# Patient Record
Sex: FEMALE | Race: WHITE | NOT HISPANIC OR LATINO | Employment: FULL TIME | URBAN - METROPOLITAN AREA
[De-identification: names, ages, dates, MRNs, and addresses within clinical notes are randomized per-mention and may not be internally consistent; named-entity substitution may affect disease eponyms.]

---

## 2023-10-22 DIAGNOSIS — C50.919 MALIGNANT NEOPLASM OF FEMALE BREAST, UNSPECIFIED ESTROGEN RECEPTOR STATUS, UNSPECIFIED LATERALITY, UNSPECIFIED SITE OF BREAST (MULTI): Primary | ICD-10-CM

## 2023-10-22 NOTE — PROGRESS NOTES
Shannon Recio is a 43 y.o. year old female patient who had a patient care encounter with Dr. Kumar on 8/21/23  for ongoing management of Malignant neoplasm of female breast, unspecified estrogen receptor status, unspecified laterality, unspecified site of breast (CMS/HCC) [C50.919] .  Shannon has MBC and is currently being treated with palbociclib.+ exemestance.    Shannon with incr arthralgias & stiffness in AM, some nausea, no other GI toxicities.  Labs from 8/21/23:  WBC 2.1, ANC 1.00, H/H 11.5/32.9, PLTs 161;  CMP stable.  7/20/23 bone scan:  no new evidence of metastatic disease.  CA 27-29 normal at 28.4 (7/20/23)..  Continue current therapy at this time.    Per collaborative practice agreement with Dr. Kumar, on 10/22/23  I refilled palbociclib 125 mg PO once daily on D1 - 21 of a 28 day cycle.  Qty # 21 with 2 refills.  The prescription was sent to Dr. Kumar for approval, pending subsequent electronic transmission to [facility].    Next FUV is 11/21/12.      Jose Ocampo, MANDY, MS, BCOP  Clinical Pharmacist Specialist - Ambulatory Oncology

## 2023-10-23 ENCOUNTER — TELEPHONE (OUTPATIENT)
Dept: HEMATOLOGY/ONCOLOGY | Facility: CLINIC | Age: 43
End: 2023-10-23
Payer: COMMERCIAL

## 2023-10-24 DIAGNOSIS — Z17.0 MALIGNANT NEOPLASM OF OVERLAPPING SITES OF LEFT BREAST IN FEMALE, ESTROGEN RECEPTOR POSITIVE (MULTI): Primary | ICD-10-CM

## 2023-10-24 DIAGNOSIS — C50.812 MALIGNANT NEOPLASM OF OVERLAPPING SITES OF LEFT BREAST IN FEMALE, ESTROGEN RECEPTOR POSITIVE (MULTI): Primary | ICD-10-CM

## 2023-10-24 RX ORDER — HEPARIN SODIUM,PORCINE/PF 10 UNIT/ML
50 SYRINGE (ML) INTRAVENOUS AS NEEDED
Status: CANCELLED | OUTPATIENT
Start: 2023-10-24

## 2023-10-24 RX ORDER — ALBUTEROL SULFATE 0.83 MG/ML
3 SOLUTION RESPIRATORY (INHALATION) AS NEEDED
Status: CANCELLED | OUTPATIENT
Start: 2024-07-28

## 2023-10-24 RX ORDER — HEPARIN 100 UNIT/ML
500 SYRINGE INTRAVENOUS AS NEEDED
Status: CANCELLED | OUTPATIENT
Start: 2023-10-24

## 2023-10-24 RX ORDER — FAMOTIDINE 10 MG/ML
20 INJECTION INTRAVENOUS ONCE AS NEEDED
Status: CANCELLED | OUTPATIENT
Start: 2024-07-28

## 2023-10-24 RX ORDER — EPINEPHRINE 0.3 MG/.3ML
0.3 INJECTION SUBCUTANEOUS EVERY 5 MIN PRN
Status: CANCELLED | OUTPATIENT
Start: 2024-07-28

## 2023-10-24 RX ORDER — DIPHENHYDRAMINE HYDROCHLORIDE 50 MG/ML
50 INJECTION INTRAMUSCULAR; INTRAVENOUS AS NEEDED
Status: CANCELLED | OUTPATIENT
Start: 2024-07-28

## 2023-10-24 NOTE — TELEPHONE ENCOUNTER
I spoke with Shannon and notified her that per Dr. Kumar she will have Zometa every 6 months and she will be due for it again in February. Shannon stated that normally she has her follow ups on Mondays and then she gets the all clear to start her medication that day after her provider visit. I notified Shannon that if she prefers we can adjust her follow up with Luis for Monday 11/20/2023 at 8am. Patient was agreeable, appreciative and had no other questions/concerns at this time. Patient appointment was adjusted.

## 2023-11-01 ENCOUNTER — SPECIALTY PHARMACY (OUTPATIENT)
Dept: PHARMACY | Facility: CLINIC | Age: 43
End: 2023-11-01

## 2023-11-20 ENCOUNTER — TELEPHONE (OUTPATIENT)
Dept: HEMATOLOGY/ONCOLOGY | Facility: CLINIC | Age: 43
End: 2023-11-20

## 2023-11-20 ENCOUNTER — LAB (OUTPATIENT)
Dept: LAB | Facility: CLINIC | Age: 43
End: 2023-11-20
Payer: COMMERCIAL

## 2023-11-20 ENCOUNTER — OFFICE VISIT (OUTPATIENT)
Dept: HEMATOLOGY/ONCOLOGY | Facility: CLINIC | Age: 43
End: 2023-11-20
Payer: COMMERCIAL

## 2023-11-20 VITALS
BODY MASS INDEX: 28.51 KG/M2 | RESPIRATION RATE: 16 BRPM | HEART RATE: 72 BPM | OXYGEN SATURATION: 95 % | WEIGHT: 151.01 LBS | HEIGHT: 61 IN | DIASTOLIC BLOOD PRESSURE: 75 MMHG | TEMPERATURE: 97.9 F | SYSTOLIC BLOOD PRESSURE: 122 MMHG

## 2023-11-20 DIAGNOSIS — Z17.0 MALIGNANT NEOPLASM OF OVERLAPPING SITES OF LEFT BREAST IN FEMALE, ESTROGEN RECEPTOR POSITIVE (MULTI): Primary | ICD-10-CM

## 2023-11-20 DIAGNOSIS — Z17.0 MALIGNANT NEOPLASM OF OVERLAPPING SITES OF LEFT BREAST IN FEMALE, ESTROGEN RECEPTOR POSITIVE (MULTI): ICD-10-CM

## 2023-11-20 DIAGNOSIS — C50.812 MALIGNANT NEOPLASM OF OVERLAPPING SITES OF LEFT BREAST IN FEMALE, ESTROGEN RECEPTOR POSITIVE (MULTI): Primary | ICD-10-CM

## 2023-11-20 DIAGNOSIS — C50.812 MALIGNANT NEOPLASM OF OVERLAPPING SITES OF LEFT BREAST IN FEMALE, ESTROGEN RECEPTOR POSITIVE (MULTI): ICD-10-CM

## 2023-11-20 DIAGNOSIS — C50.919 MALIGNANT NEOPLASM OF UNSPECIFIED SITE OF UNSPECIFIED FEMALE BREAST (MULTI): ICD-10-CM

## 2023-11-20 LAB
ALBUMIN SERPL BCP-MCNC: 4.5 G/DL (ref 3.4–5)
ALP SERPL-CCNC: 75 U/L (ref 33–110)
ALT SERPL W P-5'-P-CCNC: 135 U/L (ref 7–45)
ANION GAP SERPL CALC-SCNC: 13 MMOL/L (ref 10–20)
AST SERPL W P-5'-P-CCNC: 78 U/L (ref 9–39)
BASOPHILS # BLD AUTO: 0.05 X10*3/UL (ref 0–0.1)
BASOPHILS NFR BLD AUTO: 1.6 %
BILIRUB SERPL-MCNC: 0.3 MG/DL (ref 0–1.2)
BUN SERPL-MCNC: 16 MG/DL (ref 6–23)
CALCIUM SERPL-MCNC: 9.4 MG/DL (ref 8.6–10.3)
CANCER AG27-29 SERPL-ACNC: 22.1 U/ML (ref 0–38.6)
CHLORIDE SERPL-SCNC: 102 MMOL/L (ref 98–107)
CO2 SERPL-SCNC: 28 MMOL/L (ref 21–32)
CREAT SERPL-MCNC: 0.66 MG/DL (ref 0.5–1.05)
EOSINOPHIL # BLD AUTO: 0.04 X10*3/UL (ref 0–0.7)
EOSINOPHIL NFR BLD AUTO: 1.3 %
ERYTHROCYTE [DISTWIDTH] IN BLOOD BY AUTOMATED COUNT: 13.4 % (ref 11.5–14.5)
GFR SERPL CREATININE-BSD FRML MDRD: >90 ML/MIN/1.73M*2
GLUCOSE SERPL-MCNC: 114 MG/DL (ref 74–99)
HCT VFR BLD AUTO: 35.1 % (ref 36–46)
HGB BLD-MCNC: 11.9 G/DL (ref 12–16)
IMM GRANULOCYTES # BLD AUTO: 0.05 X10*3/UL (ref 0–0.7)
IMM GRANULOCYTES NFR BLD AUTO: 1.6 % (ref 0–0.9)
LYMPHOCYTES # BLD AUTO: 0.6 X10*3/UL (ref 1.2–4.8)
LYMPHOCYTES NFR BLD AUTO: 19.5 %
MCH RBC QN AUTO: 35.4 PG (ref 26–34)
MCHC RBC AUTO-ENTMCNC: 33.9 G/DL (ref 32–36)
MCV RBC AUTO: 105 FL (ref 80–100)
MONOCYTES # BLD AUTO: 0.61 X10*3/UL (ref 0.1–1)
MONOCYTES NFR BLD AUTO: 19.9 %
NEUTROPHILS # BLD AUTO: 1.72 X10*3/UL (ref 1.2–7.7)
NEUTROPHILS NFR BLD AUTO: 56.1 %
PLATELET # BLD AUTO: 219 X10*3/UL (ref 150–450)
POTASSIUM SERPL-SCNC: 4.6 MMOL/L (ref 3.5–5.3)
PROT SERPL-MCNC: 7 G/DL (ref 6.4–8.2)
RBC # BLD AUTO: 3.36 X10*6/UL (ref 4–5.2)
SODIUM SERPL-SCNC: 138 MMOL/L (ref 136–145)
WBC # BLD AUTO: 3.1 X10*3/UL (ref 4.4–11.3)

## 2023-11-20 PROCEDURE — 36415 COLL VENOUS BLD VENIPUNCTURE: CPT

## 2023-11-20 PROCEDURE — 99214 OFFICE O/P EST MOD 30 MIN: CPT

## 2023-11-20 PROCEDURE — 86300 IMMUNOASSAY TUMOR CA 15-3: CPT

## 2023-11-20 PROCEDURE — 80053 COMPREHEN METABOLIC PANEL: CPT

## 2023-11-20 PROCEDURE — 85025 COMPLETE CBC W/AUTO DIFF WBC: CPT

## 2023-11-20 RX ORDER — BUSPIRONE HYDROCHLORIDE 15 MG/1
1 TABLET ORAL 2 TIMES DAILY
COMMUNITY

## 2023-11-20 RX ORDER — HYDROXYZINE HYDROCHLORIDE 25 MG/1
1 TABLET, FILM COATED ORAL EVERY 4 HOURS PRN
COMMUNITY

## 2023-11-20 RX ORDER — SUMATRIPTAN 50 MG/1
50 TABLET, FILM COATED ORAL ONCE AS NEEDED
COMMUNITY

## 2023-11-20 RX ORDER — PROPRANOLOL HYDROCHLORIDE 60 MG/1
60 CAPSULE, EXTENDED RELEASE ORAL DAILY
COMMUNITY

## 2023-11-20 RX ORDER — ACETAMINOPHEN 500 MG
1 TABLET ORAL DAILY
COMMUNITY

## 2023-11-20 RX ORDER — BISMUTH SUBSALICYLATE 262 MG
1 TABLET,CHEWABLE ORAL DAILY
COMMUNITY
End: 2024-02-12 | Stop reason: WASHOUT

## 2023-11-20 RX ORDER — TRIAMCINOLONE ACETONIDE 1 MG/G
1 CREAM TOPICAL 2 TIMES DAILY
COMMUNITY

## 2023-11-20 RX ORDER — CICLOPIROX 1% / CLOBETASOL PROPIONATE 0.05% 1; .05 G/100G; G/100G
1 SHAMPOO TOPICAL EVERY OTHER DAY
COMMUNITY
End: 2024-02-12 | Stop reason: WASHOUT

## 2023-11-20 RX ORDER — VENLAFAXINE 75 MG/1
75 TABLET ORAL 2 TIMES DAILY
COMMUNITY
End: 2024-02-12 | Stop reason: WASHOUT

## 2023-11-20 RX ORDER — EXEMESTANE 25 MG/1
25 TABLET ORAL DAILY
COMMUNITY
End: 2023-12-29 | Stop reason: SDUPTHER

## 2023-11-20 ASSESSMENT — PAIN SCALES - GENERAL: PAINLEVEL: 0-NO PAIN

## 2023-11-20 NOTE — TELEPHONE ENCOUNTER
I spoke with Shannon and notified her that her ANC is normal and per Luis AGUILAR she can start her next cycle of Ibrance today with 3 weeks on and 1 week off. I also notified her that per Luis AGUILAR her kidney function was normal, and that her liver enzymes were slightly elevated. Per Shannon she does not take Tylenol and she does not drink alcohol. Bernie is aware that the providers would like for her to have a CT C/A/P and a bone scan done in the next 2-3 weeks; per Bernie either 12/11/23 or 12/12/23 could work for her schedule and her preferred facility is [facility], she also aware that she will have a phone visit with Dr. Kumar after those scans. I notified Bernie that we will work on her scheduling orders and give her a call with updates. She gave verbal understanding, was appreciative and had no other questions/concerns at this time.

## 2023-11-20 NOTE — TELEPHONE ENCOUNTER
I spoke with Shannon and notified her that the [facility] staff are aware of her appointments and is currently working on scheduling them and that she should see updated appointments with her scans and provider follow up in her [facility] and that the [facility] staff may give her a courtesy call. I also notified her that if she does not see her appointments or receive a call from [facility] by next Monday 11/27/23 to please call so we can help her schedule. Shannon gave verbal understanding, was appreciative and had no other questions/concerns at this time.

## 2023-11-20 NOTE — PROGRESS NOTES
Patient ID: Shannon Recio is a 43 y.o. female.    Subjective    HPI    Ms. Shannon Recio is a 42 y/o F who presents for follow-up for early left breast cancer. She is on exemestane, Ibrance and zometa infusions.      Abby presents for 3 month follow up. She reports she may have taken two Ibrance cycles back to back by mistake and therefore took 14 days off this cycle. She is due to start her Ibrance today if labs are appropriate. She reports tart cherry and tumeric did not do much for her morning joint pain and stiffness. She denies recent infections and signs/symptoms of bleeding. She says her fatigue is manageable and she did have diarrhea last week that resolved. She denies nausea, vomiting, constipation, bone pain, SOB, chest pain, cough, skin changes or edema. She thinks she felt a lump on her medial lower right breast but could not locate it today. She is eating and drinking well. She reports she recently had her covid and flu shot completed.     She reports new insurance will go into play at the beginning of the year.  ROS 14 points performed, See HPI for exceptions        PMHx: Migraines, anxiety     PSHx: Bilateral mastectomy, tonsillectomy, wisdom teeth removal, hemorrhoidectomy      FHx: Mother with skin cancer. Father on Plavix secondary to heart stents ad history of heart attack. Paternal grandmother had breast cancer. Maternal grandma has pancreatic cancer.  No other specific history of bleeding, clotting or malignant disorder  in the family.      Social Hx:  with 2 children (10 and 11 y/o in 2022). Little EtOH use, non-smoker. Pharmacist at [employer], currently on leave from work.      Objective         Physical Exam  Constitutional:       General: She is not in acute distress.     Appearance: Normal appearance.   HENT:      Head: Normocephalic.      Mouth/Throat:      Mouth: Mucous membranes are moist.      Pharynx: No oropharyngeal exudate or posterior oropharyngeal erythema.    Eyes:      General: No scleral icterus.     Pupils: Pupils are equal, round, and reactive to light.   Cardiovascular:      Rate and Rhythm: Normal rate and regular rhythm.   Pulmonary:      Effort: Pulmonary effort is normal. No respiratory distress.      Breath sounds: Normal breath sounds. No wheezing.   Chest:      Comments: Right breast below nipple palpated due to patient having lump in the area but patient nor I could find lump today  Abdominal:      General: Abdomen is flat. Bowel sounds are normal. There is no distension.      Palpations: Abdomen is soft.      Tenderness: There is no abdominal tenderness.   Musculoskeletal:         General: Normal range of motion.      Cervical back: Normal range of motion and neck supple.   Skin:     General: Skin is warm and dry.   Neurological:      General: No focal deficit present.      Mental Status: She is alert and oriented to person, place, and time. Mental status is at baseline.      Motor: No weakness.      Gait: Gait normal.   Psychiatric:         Mood and Affect: Mood normal.         Behavior: Behavior normal.         Judgment: Judgment normal.         Performance Status:  Asymptomatic    Labs:  Lab Results  Component Value Date   WBC 3.1 (L) 11/20/2023   NEUTROABS 1.72 11/20/2023   IGABSOL 0.05 11/20/2023   LYMPHSABS 0.60 (L) 11/20/2023   MONOSABS 0.61 11/20/2023   EOSABS 0.04 11/20/2023   BASOSABS 0.05 11/20/2023   RBC 3.36 (L) 11/20/2023    (H) 11/20/2023   MCHC 33.9 11/20/2023   HGB 11.9 (L) 11/20/2023   HCT 35.1 (L) 11/20/2023    11/20/2023    Lab Results  Component Value Date   CREATININE 0.66 11/20/2023   BUN 16 11/20/2023   EGFR >90 11/20/2023    11/20/2023   K 4.6 11/20/2023    11/20/2023   CO2 28 11/20/2023     Lab Results  Component Value Date    (H) 11/20/2023   AST 78 (H) 11/20/2023   ALKPHOS 75 11/20/2023   BILITOT 0.3 11/20/2023       Assessment/Plan     1. Left breast IDC, stage IIB [id number], ER>95% MT>95% ,  HER2 neg, low risk mammaprint  2. Biopsy proven metastases to liver 8/1/22, hormone positive     - initially palpated a left breast mass with nipple changes in 3/21/2021 and she was referred to Dr. Waterman - diagnostic mammogram with tomosynthesis on 3/30/2022 and there was a mass highly suspicious of malignancy in the left breast. No findings in  the right breast. She underwent subsequently a ultrasound on the same day and she was found to have an irregular spiculated hypoechoic shadowing mass measuring 1.9 x 1.6 x 2.9 cm at 1 o'clock, 3 cm from the nipple. There were 8 lymph nodes noted in the  left axilla with 2-3 lymph nodes of intermediate suspicion of involvement  - core needle biopsy on 4/11/2022 and final pathology shows invasive ductal carcinoma, grade 2, in DCIS in the breast mass. The most suspicious lymph node was also biopsied and negative for carcinoma  - 5/13/22 bilateral mastectomy. Left breast showed invasive ductal carcinoma, grade 3 with DCIS. Final tumor size was 3.4 cm. Grade 3 with no extensive intraductal component. There was indeterminant left LVI. DCIS was present. All margins were negative.  There was also macrometastatic carcinoma in 2/4 lymph nodes. End stage N1a. Given stage 2 disease, she did not have staging scans completed  - initially discussed with her low risk mammaprint despite higher clinical risk with node involvement, ultimately decided to not pursue adjuvant chemotherapy   - during CT sim for RT planning, found to have concerning breast lesion  - Liver biopsy 8/1/22 consistent with primary breast cancer  - today discussed new diagnosis of metastatic disease and overall approach to therapy and prognosis  - Reviewed first line therapy with CDK 4/6 inhibitor plus AI inhibitor  - Regarding choice of CDK4/6 inhibitor, given liver involvement I discussed with patient that although there is some more longer survival data with ribociclib, I would be concerned about risk of  hepatotoxicity as if there is no other distant disease we  could consider treatment of oligometastatic disease  - will plan to start Ibrance 125 mg PO daily D1-21 every 28 days with letrozole 2.5 mg PO daily, August 2022  - discussed importance of ovarian suppression when initiating Ibrance plus palbociclib and patient is agreeable  - discussed different options of ovarian suppression and ultimately she decided on bilateral oophorectomy  - referral placed with Dr Giles for surgery  - will plan for to initiate Lupron every 4 weeks for ovarian suppression until surgery is completed   - discussed side effects of treatment  and consent signed  - received further education with pharmFREDRICK Mayo  - will plan for followup with labs [id number]   - bilateral salpingo-oophorectomy on 10/10/22  - Signatera from 11/7 0.07 MTM/mL     1/9/23:  - ANC at goal today.  - We will continue with same dose of Ibrance.  - She is planned for surgery on 1/31/23. Discussed we will hold her Ibrance starting 1/24/23, which is 1 week early and when she starts her next prescription, we will plan to administer 2 weeks rather than 3.  - We discussed that we will plan for Signatera at next visit along with a PET CT prior to next visit before breast reconstruction, which will be in approximately 3 weeks  - At that point, we will discuss if there are no other site of disease if there can be any definitive management for her liver  - RTC in approximately 3 weeks      2/6/23:  - Overall, patient is recovering okay from her reconstructive surgery. States that she is still sore.  - We did review her PET CT from 1/23/23, which shows there is no clear evidence of disease. The lesion on the liver is not very strongly FDG-avid anymore, however we discussed that it is unclear if viable disease could still be present.  - Discussed that I will discuss at Tumor Board this Thursday if there is any role for definitive treatment of her liver.  - She will restart  her Ibrance tonight or tomorrow.   - We did review her blood work today which shows just a stable anemia with hemoglobin 10, that is likely related to recent surgery.  - We did review that for her hot flashes she could increase her Effexor. We will place a new prescription today. We also discussed different treatment options including tart cherry and tumeric for arthralgias and different vitamins for hair loss. We will  reassess at our next visit.  - RTC in 4 weeks for next blood work and follow-up.      3/6/23:   Overall, tolerating Ibrance without any major toxicities. Counts are at goal.  - Discussed we can stretch our follow-ups to every 3 months.   - Did review her MRI of the liver which did show a decreasing liver mass, however we will discuss with Dr. Horne regarding SBRT.   - Otherwise, no major complaints.   - We discussed that we will continue Signatera on an every 3-month basis with the goal that we will limit scans if her Signatera continues to be negative.   - RTC in 3 months.     6/5/23:  - Patient did complete 50 Gy in 25 fractions to her regional nodes and chest wall along with SBRT completed on 5/22/2023. She also underwent SBRT to the liver metastases from 4/4 to 4/11/2023, for a total of 54 Gy in 3 fractions.   - Patient overall tolerated radiation fine. States her skin is recovering.  - She is currently only 1 week out from her recent cycle of Ibrance which likely explains why she is having worsening cytopenias.  - We discussed that we will plan for Signatera today and discuss results.    - We will plan for repeat visit in 7 weeks which will be completion of her cycle. We will plan for a PET CT as well prior to that visit.  - RTC in 7 weeks with me with scans and labs prior.      8/21/23:  - Reviewed scans from 7/20/23. No new evidence of recurrence.   -We did review her Signatera is also negative and her blood work is unremarkable.   - She is having some more arthralgias and describes some  nausea and tingling more in the left hand but also sometimes in her right hand with use.   - We will check a CBC and CMP today to make sure her counts are at goal. Once I have those results, we will discuss if it is okay to continue same dose of Ibrance versus decreased dose along with if we want to make a switch to exemestane.  - RTC in 3 months with Luis and I will see her again in 6 months if she is doing well.    11/20/2023:   - 3 month follow up   - Believes she forgot to take her week off between Ibrance cycles so just took 14 days off and plans to restart today  - She denies new or worsening symptoms or concerns  - She is going to see Dr. Waterman for follow up on 12/4/23  - She is scheduled to see Dr. Kumar on 2/21/24  - Labs today show ALT and AST mildly elevated which may be related to Ibrance vs disease as she denies taking Tylenol regularly or drinking alcohol  - ANC 1.72 is above goal so she will proceed with next dose of Ibrance today and aware to take for 21 days then off for 7 days  - Due to LFTs we will go ahead and reorder staging scans including CT c/a/p and bone scan with phone follow up with Dr. Kumar in December   - We will call her with her signatera results   - Referral to integrative oncology for ongoing joint stiffness/pain  - RTC in Feb. With Dr. Kumar for labs and zometa       Diagnoses and all orders for this visit:  Malignant neoplasm of overlapping sites of left breast in female, estrogen receptor positive (CMS/HCC)  -     Cancer Antigen 27-29; Future  -     Referral to [facility]; Future             Luis Kohli, TAB-CNP

## 2023-11-21 ENCOUNTER — APPOINTMENT (OUTPATIENT)
Dept: HEMATOLOGY/ONCOLOGY | Facility: CLINIC | Age: 43
End: 2023-11-21

## 2023-11-28 ENCOUNTER — TELEPHONE (OUTPATIENT)
Dept: HEMATOLOGY/ONCOLOGY | Facility: CLINIC | Age: 43
End: 2023-11-28
Payer: COMMERCIAL

## 2023-11-28 NOTE — TELEPHONE ENCOUNTER
Left message on name specific voice mail with the test result. Encouraged to call office back with any further questions or concerns.

## 2023-12-01 NOTE — PROGRESS NOTES
BREAST SURGICAL ONCOLOGY FOLLOW UP     Assessment/Plan     1. left breast IDC g2 ER 95% MI 95% HER2- at 1:00 3cmFN measuring 3.4 on final pathology s/p bilateral ssm, left slnb 2/4)  -[id number] stage IV with metastasis to liver  -s/p BSO  -on Ibrance and letrozole with good response on subsequent imaging  -plans in the future for liver resection  -stage 2 reconstruction with Dr. Matta  -s/p XRT to left chest wall and breast and SBRT to liver   -Signatera 11/27/2023 negative     2. family history of breast cancer  -genetic testing negative     Clinical breast exam today is normal.  There is no evidence of local recurrence.    Continue follow up with medical oncology as scheduled.    Will follow up in the breast center with me in 1 year or sooner for any concerns.    Subjective   Shannon Recio is a 43 y.o. female presents today for follow up carcinoma of the left breast.     Treatment history    Left breast mass palpated with nipple changes by patient's PCP. Prior to this Abby did not note any changes. After visit with the PCP she notes a mass and nipple inversion. She also notes changes / contour irregularity to the breast while leaning forward     Diagnostic mammogram and U/S on 3/31/2022 reveal a mass in the left breast 1:00 3cmFN measuring 4.4cm in greatest dimension. In the axilla there are 2 abnormal level 1 lymph nodes. U/S biopsy of mass and lymph node #7 is recommended.      4/11/2022 left U/S biopsy:  breast mass 1:00 IDC g2 ER 95% MI 95% HER2-  lymph node: benign and concordant     breast MRI ordered given breast density and young age of diagnosis.     5/15/2022 breast MRI: mass UOQ left breast 4.6cm, additional area of enhancement at the base of the nipple concerning for involvement with nipple retraction. No skin or pectoralis enhancement. Biopsy clip in left axillary lymph node- no axillary adenopathy bilaterally. Right breast no suspicious masses or enhancement.      5/13/2022 left skin  sparing mastectomy: tumor 3.4cm, margins >2mm, sentinel lymph node biopsy (2/4). right skin sparing mastectomy: benign.     Staging scans were initially ordered by me but denied by insurance. She then had her CT simulation for post mastectomy radiation. CT SIM with findings concerning for a liver lesion. This then allowed staging scans to be performed. Subsequent liver bx confirming liver metastasis.      She is now on Ibrance and letrozole (s/p BSO). most recent imaging with no disease progression. Liver lesion is significantly smaller.      s/p SBRT to liver and XRT to left chest wall and axilla.    Review of Systems   Constitutional symptoms: Denies generalized fatigue.  Denies weight change, fevers/chills, difficulty sleeping   Eyes: Denies double vision, glaucoma, cataracts.  Ear/nose/throat/mouth: Denies hearing changes, sore throat, sinus problems.  Cardiovascular: No chest pain.  Denies irregular heartbeat.  Denies ankle swelling.  Respiratory: No wheezing, cough, or shortness of breath.  Gastrointestinal: No abdominal pain,  No nausea/vomiting.  No indigestion/heartburn.  No change in bowel habits.  No constipation or diarrhea.   Genitourinary: No urinary incontinence.  No urinary frequency.  No painful urination.  Musculoskeletal: No bone pain, no muscle pain, no joint pain.   Integumentary: No rash. No masses.  No changes in moles.  No easy bruising.  Neurological: No headaches.  No tremors. No numbness/tingling.  Psychiatric: No anxiety. No depression.  Endocrine: No excessive thirst.  Not too hot or too cold.  Not tired or fatigued.    Hematological/lymphatic: No swollen glands or blood clotting problems.  No bruising.    Objective   Physical Exam  General: Alert and oriented x 3.  Mood and affect are appropriate.  HEENT: EOMI, PERRLA.   Neck: supple, no masses, no cervical adenopathy.  Cardiovascular: no lower extremity edema.  Pulmonary: breathing non labored on room air.  GI: Abdomen soft, no masses.  No hepatomegaly or splenomegaly.  Lymph nodes: No supraclavicular or axillary adenopathy bilaterally.  Musculoskeletal: Full range of motion in the upper extremities bilaterally.  Neuro: denies dizziness, tremors    Breasts: The breasts were examined in both the seated and supine positions.   RIGHT: surgically absent with implant reconstruction. Flaps viable. No suspicious masses.   LEFT: surgically absent with implant reconstruction. Flaps viable. No suspicious masses. Left implant sits higher with more superior pole projection. Acute XRT changes lateral chest all and shoulder.       Radiology review: All images and reports were personally reviewed.         Itzel Waterman, DO

## 2023-12-04 ENCOUNTER — OFFICE VISIT (OUTPATIENT)
Dept: SURGICAL ONCOLOGY | Facility: HOSPITAL | Age: 43
End: 2023-12-04
Payer: COMMERCIAL

## 2023-12-04 VITALS
SYSTOLIC BLOOD PRESSURE: 133 MMHG | HEIGHT: 61 IN | HEART RATE: 69 BPM | DIASTOLIC BLOOD PRESSURE: 86 MMHG | BODY MASS INDEX: 28.32 KG/M2 | WEIGHT: 150 LBS

## 2023-12-04 DIAGNOSIS — C50.812 MALIGNANT NEOPLASM OF OVERLAPPING SITES OF LEFT BREAST IN FEMALE, ESTROGEN RECEPTOR POSITIVE (MULTI): Primary | ICD-10-CM

## 2023-12-04 DIAGNOSIS — Z17.0 MALIGNANT NEOPLASM OF OVERLAPPING SITES OF LEFT BREAST IN FEMALE, ESTROGEN RECEPTOR POSITIVE (MULTI): Primary | ICD-10-CM

## 2023-12-04 PROCEDURE — 99213 OFFICE O/P EST LOW 20 MIN: CPT | Performed by: SURGERY

## 2023-12-04 PROCEDURE — 1036F TOBACCO NON-USER: CPT | Performed by: SURGERY

## 2023-12-08 PROBLEM — D22.70 MELANOCYTIC NEVI OF LOWER LIMB, INCLUDING HIP: Status: ACTIVE | Noted: 2022-09-14

## 2023-12-08 PROBLEM — L24.9 IRRITANT CONTACT DERMATITIS, UNSPECIFIED CAUSE: Status: ACTIVE | Noted: 2022-09-14

## 2023-12-08 PROBLEM — R90.89 ABNORMAL BRAIN MRI: Status: ACTIVE | Noted: 2023-12-08

## 2023-12-08 PROBLEM — U07.1 COVID-19 VIRUS INFECTION: Status: ACTIVE | Noted: 2023-12-08

## 2023-12-08 PROBLEM — I49.3 PVC (PREMATURE VENTRICULAR CONTRACTION): Status: ACTIVE | Noted: 2023-12-08

## 2023-12-08 PROBLEM — J40 BRONCHITIS: Status: ACTIVE | Noted: 2023-12-08

## 2023-12-08 PROBLEM — F32.A ANXIETY AND DEPRESSION: Status: ACTIVE | Noted: 2023-12-08

## 2023-12-08 PROBLEM — T88.8XXA FLUID COLLECTION AT SURGICAL SITE: Status: RESOLVED | Noted: 2023-12-08 | Resolved: 2023-12-08

## 2023-12-08 PROBLEM — R21 RASH, SKIN: Status: ACTIVE | Noted: 2023-12-08

## 2023-12-08 PROBLEM — B07.9 WART: Status: ACTIVE | Noted: 2023-12-08

## 2023-12-08 PROBLEM — I83.90 VARICOSE VEIN OF LEG: Status: ACTIVE | Noted: 2023-12-08

## 2023-12-08 PROBLEM — L81.4 OTHER MELANIN HYPERPIGMENTATION: Status: ACTIVE | Noted: 2022-09-14

## 2023-12-08 PROBLEM — E55.9 VITAMIN D DEFICIENCY: Status: ACTIVE | Noted: 2023-12-08

## 2023-12-08 PROBLEM — R00.2 PALPITATIONS: Status: ACTIVE | Noted: 2023-12-08

## 2023-12-08 PROBLEM — F41.9 ANXIETY AND DEPRESSION: Status: ACTIVE | Noted: 2023-12-08

## 2023-12-08 PROBLEM — R92.30 DENSE BREAST: Status: ACTIVE | Noted: 2023-12-08

## 2023-12-08 PROBLEM — L82.1 OTHER SEBORRHEIC KERATOSIS: Status: ACTIVE | Noted: 2022-09-14

## 2023-12-08 PROBLEM — N63.25 BREAST LUMP ON LEFT SIDE AT 12 O'CLOCK POSITION: Status: ACTIVE | Noted: 2023-12-08

## 2023-12-08 PROBLEM — G43.909 MIGRAINE: Status: ACTIVE | Noted: 2023-12-08

## 2023-12-08 PROBLEM — N64.59 INVERSION OF LEFT NIPPLE: Status: ACTIVE | Noted: 2023-12-08

## 2023-12-08 PROBLEM — D23.9 OTHER BENIGN NEOPLASM OF SKIN, UNSPECIFIED: Status: ACTIVE | Noted: 2022-09-14

## 2023-12-08 PROBLEM — B35.3 TINEA PEDIS: Status: ACTIVE | Noted: 2022-09-14

## 2023-12-08 PROBLEM — B02.9 SHINGLES: Status: ACTIVE | Noted: 2023-12-08

## 2023-12-08 PROBLEM — L21.9 SEBORRHEIC DERMATITIS OF SCALP: Status: ACTIVE | Noted: 2022-09-14

## 2023-12-08 PROBLEM — J34.1 MUCOUS RETENTION CYST OF MAXILLARY SINUS: Status: ACTIVE | Noted: 2023-12-08

## 2023-12-08 PROBLEM — R19.7 DIARRHEA: Status: ACTIVE | Noted: 2023-12-08

## 2023-12-08 PROBLEM — L70.0 ACNE VULGARIS: Status: ACTIVE | Noted: 2022-09-14

## 2023-12-08 PROBLEM — N83.209 CYSTIC DISEASE OF OVARIES: Status: ACTIVE | Noted: 2023-12-08

## 2023-12-08 PROBLEM — D18.01 HEMANGIOMA OF SKIN AND SUBCUTANEOUS TISSUE: Status: ACTIVE | Noted: 2022-09-14

## 2023-12-08 PROBLEM — R92.2 DENSE BREAST: Status: ACTIVE | Noted: 2023-12-08

## 2023-12-08 RX ORDER — ERGOCALCIFEROL 1.25 MG/1
1 CAPSULE ORAL
COMMUNITY
End: 2024-02-12 | Stop reason: WASHOUT

## 2023-12-08 RX ORDER — ONDANSETRON HYDROCHLORIDE 8 MG/1
8 TABLET, FILM COATED ORAL 2 TIMES DAILY
COMMUNITY
Start: 2023-03-23 | End: 2024-02-12 | Stop reason: WASHOUT

## 2023-12-08 RX ORDER — PREDNISONE 5 MG/1
TABLET ORAL
COMMUNITY
Start: 2016-12-20 | End: 2024-02-12 | Stop reason: WASHOUT

## 2023-12-08 RX ORDER — NAPROXEN SODIUM 550 MG/1
TABLET ORAL EVERY 12 HOURS
COMMUNITY
Start: 2015-05-21

## 2023-12-08 RX ORDER — BETAMETHASONE DIPROPIONATE 0.5 MG/G
CREAM TOPICAL
COMMUNITY
End: 2024-02-12 | Stop reason: WASHOUT

## 2023-12-08 RX ORDER — CEPHALEXIN 500 MG/1
500 CAPSULE ORAL 3 TIMES DAILY
COMMUNITY
Start: 2023-01-31 | End: 2024-02-12 | Stop reason: WASHOUT

## 2023-12-08 RX ORDER — CLOBETASOL PROPIONATE 0.46 MG/ML
SOLUTION TOPICAL
COMMUNITY
Start: 2015-08-24 | End: 2024-02-12 | Stop reason: WASHOUT

## 2023-12-08 RX ORDER — FAMCICLOVIR 500 MG/1
1 TABLET ORAL 3 TIMES DAILY
COMMUNITY
Start: 2022-08-31 | End: 2024-02-12 | Stop reason: WASHOUT

## 2023-12-08 RX ORDER — METRONIDAZOLE 500 MG/1
500 TABLET ORAL 2 TIMES DAILY
COMMUNITY
Start: 2021-02-05 | End: 2024-02-12 | Stop reason: WASHOUT

## 2023-12-08 RX ORDER — MULTIVITAMIN WITH IRON
1 TABLET ORAL DAILY
COMMUNITY
End: 2024-02-12 | Stop reason: WASHOUT

## 2023-12-08 RX ORDER — AMOXICILLIN 875 MG/1
1 TABLET, FILM COATED ORAL EVERY 12 HOURS
COMMUNITY
Start: 2022-08-29 | End: 2024-02-12 | Stop reason: WASHOUT

## 2023-12-08 RX ORDER — DIPHENHYDRAMINE HCL 25 MG
TABLET ORAL
COMMUNITY
End: 2024-02-12 | Stop reason: WASHOUT

## 2023-12-08 RX ORDER — NORETHINDRONE ACETATE AND ETHINYL ESTRADIOL, AND FERROUS FUMARATE 1MG-20(24)
1 KIT ORAL DAILY
COMMUNITY
Start: 2021-09-24 | End: 2024-02-12 | Stop reason: WASHOUT

## 2023-12-08 RX ORDER — CICLOPIROX 1 G/100ML
SHAMPOO TOPICAL
COMMUNITY
Start: 2021-06-30

## 2023-12-08 RX ORDER — TRETINOIN 0.25 MG/G
CREAM TOPICAL
COMMUNITY
Start: 2022-09-14 | End: 2024-02-12 | Stop reason: WASHOUT

## 2023-12-08 RX ORDER — NORETHINDRONE ACETATE/ETHINYL ESTRADIOL AND FERROUS FUMARATE 1MG-20(24)
KIT ORAL
COMMUNITY
End: 2024-02-12 | Stop reason: WASHOUT

## 2023-12-08 RX ORDER — LETROZOLE 2.5 MG/1
2.5 TABLET, FILM COATED ORAL DAILY
COMMUNITY
End: 2024-02-12 | Stop reason: WASHOUT

## 2023-12-08 RX ORDER — KETOCONAZOLE 20 MG/ML
SHAMPOO, SUSPENSION TOPICAL
COMMUNITY
Start: 2015-08-24 | End: 2024-02-12 | Stop reason: WASHOUT

## 2023-12-11 ENCOUNTER — HOSPITAL ENCOUNTER (OUTPATIENT)
Dept: RADIOLOGY | Facility: HOSPITAL | Age: 43
Discharge: HOME | End: 2023-12-11
Payer: COMMERCIAL

## 2023-12-11 DIAGNOSIS — C50.812 MALIGNANT NEOPLASM OF OVERLAPPING SITES OF LEFT BREAST IN FEMALE, ESTROGEN RECEPTOR POSITIVE (MULTI): ICD-10-CM

## 2023-12-11 DIAGNOSIS — Z17.0 MALIGNANT NEOPLASM OF OVERLAPPING SITES OF LEFT BREAST IN FEMALE, ESTROGEN RECEPTOR POSITIVE (MULTI): ICD-10-CM

## 2023-12-11 PROCEDURE — 78306 BONE IMAGING WHOLE BODY: CPT | Performed by: NUCLEAR MEDICINE

## 2023-12-11 PROCEDURE — 74177 CT ABD & PELVIS W/CONTRAST: CPT | Performed by: STUDENT IN AN ORGANIZED HEALTH CARE EDUCATION/TRAINING PROGRAM

## 2023-12-11 PROCEDURE — A9503 TC99M MEDRONATE: HCPCS

## 2023-12-11 PROCEDURE — 78306 BONE IMAGING WHOLE BODY: CPT

## 2023-12-11 PROCEDURE — 3430000001 HC RX 343 DIAGNOSTIC RADIOPHARMACEUTICALS

## 2023-12-11 PROCEDURE — 2550000001 HC RX 255 CONTRASTS

## 2023-12-11 PROCEDURE — 74177 CT ABD & PELVIS W/CONTRAST: CPT

## 2023-12-11 PROCEDURE — 71260 CT THORAX DX C+: CPT | Performed by: STUDENT IN AN ORGANIZED HEALTH CARE EDUCATION/TRAINING PROGRAM

## 2023-12-11 RX ADMIN — TECHNETIUM TC 99M MEDRONATE 23.1 MILLICURIE: 25 INJECTION, POWDER, FOR SOLUTION INTRAVENOUS at 09:00

## 2023-12-11 RX ADMIN — IOHEXOL 75 ML: 350 INJECTION, SOLUTION INTRAVENOUS at 10:10

## 2023-12-17 NOTE — PROGRESS NOTES
Consent:  Verbal consent was requested and obtained from patient on this date for a telehealth visit.    Patient ID: Shannon Recio is a 43 y.o. female.    Subjective    HPI  A telephone visit (audio only) between the patient at home and the provider at [facility] was utilized to provide this   telehealth service.     Ms. Shannon Recio is a 44 y/o F who presents for follow-up for early left breast cancer. She is on exemestane, Ibrance and Zometa infusions.     Since last visit, patient had a CT C/A/P along with bone scan on 12/11/2023. There are unchanged prominent bilateral axillary lymph nodes likely reactive and a slightly less conspicuous hypoattenuating focus in segment 8/4A measuring 1.3 cm, previously 1.6 cm. No new suspicious hepatic lesions or other suspicious findings in the chest, abdomen or pelvis. Bone scan shows no new evidence of osseous metastatic disease.     Patient reports that her hot flashes have improved. She reports morning arthralgias that improve as she gets moving. She states that her fatigue is stable. She drinks minimally and no heavy Tylenol use. No other complaints today.     Patient's past medical history, surgical history, family history and social history reviewed.     Objective    There were no vitals taken for this visit.    Review of Systems:   Review of Systems:    Positive per HPI, otherwise negative.     Physical Exam:   Exam deferred due to telephone call.     Performance Status:  Symptomatic; fully ambulatory    Labs/Imaging/Pathology: personally reviewed reports and images in Epic electronic medical record system. Pertinent results as it related to the plan represented in below in assessment and plan.      Assessment/Plan    1. Left breast IDC, stage IIB [id number], ER>95% NH>95% , HER2 neg, low risk mammaprint  2. Biopsy proven metastases to liver 8/1/22, hormone positive     - initially palpated a left breast mass with nipple changes in  3/21/2021 and she was referred to Dr. Waterman - diagnostic mammogram with tomosynthesis on 3/30/2022 and there was a mass highly suspicious of malignancy in the left breast. No findings in  the right breast. She underwent subsequently a ultrasound on the same day and she was found to have an irregular spiculated hypoechoic shadowing mass measuring 1.9 x 1.6 x 2.9 cm at 1 o'clock, 3 cm from the nipple. There were 8 lymph nodes noted in the  left axilla with 2-3 lymph nodes of intermediate suspicion of involvement  - core needle biopsy on 4/11/2022 and final pathology shows invasive ductal carcinoma, grade 2, in DCIS in the breast mass. The most suspicious lymph node was also biopsied and negative for carcinoma  - 5/13/22 bilateral mastectomy. Left breast showed invasive ductal carcinoma, grade 3 with DCIS. Final tumor size was 3.4 cm. Grade 3 with no extensive intraductal component. There was indeterminant left LVI. DCIS was present. All margins were negative.  There was also macrometastatic carcinoma in 2/4 lymph nodes. End stage N1a. Given stage 2 disease, she did not have staging scans completed  - initially discussed with her low risk mammaprint despite higher clinical risk with node involvement, ultimately decided to not pursue adjuvant chemotherapy   - during CT sim for RT planning, found to have concerning breast lesion  - Liver biopsy 8/1/22 consistent with primary breast cancer  - today discussed new diagnosis of metastatic disease and overall approach to therapy and prognosis  - Reviewed first line therapy with CDK 4/6 inhibitor plus AI inhibitor  - Regarding choice of CDK4/6 inhibitor, given liver involvement I discussed with patient that although there is some more longer survival data with ribociclib, I would be concerned about risk of hepatotoxicity as if there is no other distant disease we  could consider treatment of oligometastatic disease  - will plan to start Ibrance 125 mg PO daily D1-21 every 28  days with letrozole 2.5 mg PO daily, August 2022  - discussed importance of ovarian suppression when initiating Ibrance plus palbociclib and patient is agreeable  - discussed different options of ovarian suppression and ultimately she decided on bilateral oophorectomy  - referral placed with Dr Giles for surgery  - will plan for to initiate Lupron every 4 weeks for ovarian suppression until surgery is completed   - discussed side effects of treatment  and consent signed  - received further education with pharmFREDRICK Mayo  - will plan for followup with labs C1D15   - bilateral salpingo-oophorectomy on 10/10/22  - Signatera from 11/7 0.07 MTM/mL     1/9/23:  - ANC at goal today.  - We will continue with same dose of Ibrance.  - She is planned for surgery on 1/31/23. Discussed we will hold her Ibrance starting 1/24/23, which is 1 week early and when she starts her next prescription, we will plan to administer 2 weeks rather than 3.  - We discussed that we will plan for Signatera at next visit along with a PET CT prior to next visit before breast reconstruction, which will be in approximately 3 weeks  - At that point, we will discuss if there are no other site of disease if there can be any definitive management for her liver  - RTC in approximately 3 weeks      2/6/23:  - Overall, patient is recovering okay from her reconstructive surgery. States that she is still sore.  - We did review her PET CT from 1/23/23, which shows there is no clear evidence of disease. The lesion on the liver is not very strongly FDG-avid anymore, however we discussed that it is unclear if viable disease could still be present.  - Discussed that I will discuss at Tumor Board this Thursday if there is any role for definitive treatment of her liver.  - She will restart her Ibrance tonight or tomorrow.   - We did review her blood work today which shows just a stable anemia with hemoglobin 10, that is likely related to recent surgery.  -  We did review that for her hot flashes she could increase her Effexor. We will place a new prescription today. We also discussed different treatment options including tart cherry and tumeric for arthralgias and different vitamins for hair loss. We will  reassess at our next visit.  - RTC in 4 weeks for next blood work and follow-up.      3/6/23:   Overall, tolerating Ibrance without any major toxicities. Counts are at goal.  - Discussed we can stretch our follow-ups to every 3 months.   - Did review her MRI of the liver which did show a decreasing liver mass, however we will discuss with Dr. Horne regarding SBRT.   - Otherwise, no major complaints.   - We discussed that we will continue Signatera on an every 3-month basis with the goal that we will limit scans if her Signatera continues to be negative.   - RTC in 3 months.     6/5/23:  - Patient did complete 50 Gy in 25 fractions to her regional nodes and chest wall along with SBRT completed on 5/22/2023. She also underwent SBRT to the liver metastases from 4/4 to 4/11/2023, for a total of 54 Gy in 3 fractions.   - Patient overall tolerated radiation fine. States her skin is recovering.  - She is currently only 1 week out from her recent cycle of Ibrance which likely explains why she is having worsening cytopenias.  - We discussed that we will plan for Signatera today and discuss results.    - We will plan for repeat visit in 7 weeks which will be completion of her cycle. We will plan for a PET CT as well prior to that visit.  - RTC in 7 weeks with me with scans and labs prior.      8/21/23:  - Reviewed scans from 7/20/23. No new evidence of recurrence.   -We did review her Signatera is also negative and her blood work is unremarkable.   - She is having some more arthralgias and describes some nausea and tingling more in the left hand but also sometimes in her right hand with use.   - We will check a CBC and CMP today to make sure her counts are at goal. Once I have  those results, we will discuss if it is okay to continue same dose of Ibrance versus decreased dose along with if we want to make a switch to exemestane.  - RTC in 3 months with Luis and I will see her again in 6 months if she is doing well.     11/20/2023:   - 3 month follow up   - Believes she forgot to take her week off between Ibrance cycles so just took 14 days off and plans to restart today  - She denies new or worsening symptoms or concerns  - She is going to see Dr. Waterman for follow up on 12/4/23  - She is scheduled to see Dr. Kumar on 2/21/24  - Labs today show ALT and AST mildly elevated which may be related to Ibrance vs disease as she denies taking Tylenol regularly or drinking alcohol   ANC 1.72 is above goal so she will proceed with next dose of Ibrance today and aware to take for 21 days then off for 7 days  - Due to LFTs we will go ahead and reorder staging scans including CT c/a/p and bone scan with phone follow up with Dr. Kumar in December   - We will call her with her signatera results   - Referral to integrative oncology for ongoing joint stiffness/pain  - RTC in Feb. With Dr. Kumar for labs and zometa      12/18/23: Telephone call   - Most recent imaging from 12/11/23 shows no new evidence of recurrence in the bone, chest, abdomen or pelvis.   - Patient had some mild elevation in AST and ALT on 11/11/23, however she does believe she had some increased toxicity possibly from missing a week off of the drug.  - Overall, her CA27-29 and her Signatera from 11/27/23 remain negative.   - At this point, we will continue with surveillance alone with repeat labs in 2 months in February 2024, which will be 3 months from her last visit. She knows to call us with any new symptoms in the meantime.   - We will likely plan for imaging every 6 months if her other blood work remains stable and no evidence of recurrence on exam.   - She does report some increased arthralgias but overall reports they are  manageable. She is going to see Dr. Collazo from Interventional Radiology.   - States her hot flashes have improved.  - We will not make any dose adjustment at this point.   - RTC in 2 months before the start of her cycle.     RTC in 2 months before the start of her next cycle. This note has been transcribed using a medical scribe and there is a possibility of unintentional typing misprints.      Diagnoses and all orders for this visit:  Malignant neoplasm of overlapping sites of left breast in female, estrogen receptor positive (CMS/HCC)  -     Clinic Appointment Request Virtual Est (phone); SHAUN KUMAR  -     Cancer Antigen 27-29; Future  -     CBC and Auto Differential; Future  -     Clinic Appointment Request; Future  -     Comprehensive metabolic panel; Future  -     SIGNATERA; Other-indicate in comment; UNKNOWN - Miscellaneous Test; Future    Shaun Kumar MD  Hematology/Oncology  [facility]    Scribe Attestation  By signing my name below, Rebecca COLLINS  , Scribe   attest that this documentation has been prepared under the direction and in the presence of Shaun Kumar MD.

## 2023-12-18 ENCOUNTER — TELEMEDICINE (OUTPATIENT)
Dept: HEMATOLOGY/ONCOLOGY | Facility: CLINIC | Age: 43
End: 2023-12-18
Payer: COMMERCIAL

## 2023-12-18 DIAGNOSIS — Z17.0 MALIGNANT NEOPLASM OF OVERLAPPING SITES OF LEFT BREAST IN FEMALE, ESTROGEN RECEPTOR POSITIVE (MULTI): ICD-10-CM

## 2023-12-18 DIAGNOSIS — C50.812 MALIGNANT NEOPLASM OF OVERLAPPING SITES OF LEFT BREAST IN FEMALE, ESTROGEN RECEPTOR POSITIVE (MULTI): ICD-10-CM

## 2023-12-18 PROCEDURE — 99214 OFFICE O/P EST MOD 30 MIN: CPT | Performed by: INTERNAL MEDICINE

## 2023-12-29 DIAGNOSIS — C50.812 MALIGNANT NEOPLASM OF OVERLAPPING SITES OF LEFT BREAST IN FEMALE, ESTROGEN RECEPTOR POSITIVE (MULTI): ICD-10-CM

## 2023-12-29 DIAGNOSIS — Z17.0 MALIGNANT NEOPLASM OF OVERLAPPING SITES OF LEFT BREAST IN FEMALE, ESTROGEN RECEPTOR POSITIVE (MULTI): ICD-10-CM

## 2023-12-29 RX ORDER — EXEMESTANE 25 MG/1
25 TABLET ORAL DAILY
Qty: 30 TABLET | Refills: 3 | Status: SHIPPED | OUTPATIENT
Start: 2023-12-29 | End: 2024-05-06 | Stop reason: SDUPTHER

## 2024-01-08 DIAGNOSIS — Z17.0 MALIGNANT NEOPLASM OF OVERLAPPING SITES OF LEFT BREAST IN FEMALE, ESTROGEN RECEPTOR POSITIVE (MULTI): Primary | ICD-10-CM

## 2024-01-08 DIAGNOSIS — C50.812 MALIGNANT NEOPLASM OF OVERLAPPING SITES OF LEFT BREAST IN FEMALE, ESTROGEN RECEPTOR POSITIVE (MULTI): Primary | ICD-10-CM

## 2024-01-08 NOTE — PROGRESS NOTES
Shannon Recio is a 43 y.o. year old female patient who had a patient care encounter with Dr. Kumar on 12/18/23 for ongoing management of her MBC.  Shannon is currently being treated with palbociclib + exemestane.  Also receiving zoledronic acid.    Shannon has some arthralgias which are manageable.  Most recent imaging from 12/11/23:  no new evidence of recurrence.  Most recent labs on 11/20/23:  WBC 3.1, ANC 1.72, H/H 11.9/35.1, PLTs 219;   (3 x ULN, gr 1), AST 78 (2 x ULN, gr 1) -- per Dr. Kumar, LFTs may relate to patient possibly missing a week of therapy, team monitoring.  Continue current therapy.    Per collaborative practice agreement with Dr. Kumar, on 1/8/24  I entered a new [place] treatment plan for palbociclib 125 mg PO once daily on D1 - 21 of a 28-day cycle.  Qty # 21 with 5 refills.  The prescription was sent to Dr. Kumar for approval, pending subsequent electronic transmission to [facility].    Next FUV is 2/12/24.      Jose Ocampo, MANDY, MS, BCOP  Clinical Pharmacist Specialist - Ambulatory Oncology

## 2024-01-22 ENCOUNTER — ALLIED HEALTH (OUTPATIENT)
Dept: INTEGRATIVE MEDICINE | Facility: CLINIC | Age: 44
End: 2024-01-22
Payer: COMMERCIAL

## 2024-01-22 ENCOUNTER — TELEPHONE (OUTPATIENT)
Dept: HEMATOLOGY/ONCOLOGY | Facility: CLINIC | Age: 44
End: 2024-01-22

## 2024-01-22 DIAGNOSIS — F41.9 ANXIETY AND DEPRESSION: ICD-10-CM

## 2024-01-22 DIAGNOSIS — C50.812 MALIGNANT NEOPLASM OF OVERLAPPING SITES OF LEFT BREAST IN FEMALE, ESTROGEN RECEPTOR POSITIVE (MULTI): Primary | ICD-10-CM

## 2024-01-22 DIAGNOSIS — F32.A ANXIETY AND DEPRESSION: ICD-10-CM

## 2024-01-22 DIAGNOSIS — Z17.0 MALIGNANT NEOPLASM OF OVERLAPPING SITES OF LEFT BREAST IN FEMALE, ESTROGEN RECEPTOR POSITIVE (MULTI): Primary | ICD-10-CM

## 2024-01-22 PROCEDURE — 99205 OFFICE O/P NEW HI 60 MIN: CPT | Performed by: HOSPITALIST

## 2024-01-22 NOTE — TELEPHONE ENCOUNTER
I spoke with Dee about her appointments. I did let her know that I canceled the 21st infusion, lab draw and FUV with Dr. Kumar. I did let her know that I will move up her follow up with dr. Kumar to the AM with an infusion to follow. She was very appreciative and will see all updates via [employer].

## 2024-01-22 NOTE — PROGRESS NOTES
"Patient ID: Shannon Recio is a 43 y.o. female.  Referring Physician: No referring provider defined for this encounter.  Primary Care Provider: Lee Ann Sellers MD  An interactive audio and video telecommunication system which permits real time communications between the patient (at the originating site) and provider (at the distant site) was utilized to provide this telehealth service.    Verbal consent was requested and obtained on this date for a telehealth visit.     CANCER HISTORY:   42 yo woman with h/o L breast IDC, stage IIB, pT2N1a, ER>95%, HER 2 neg, low risk mammaprint  Biopsy proven liver mets 8/22 - ER+  S/p bilat mastectomy 5/22, no adjuvant chemo initially  Current Treatment: Letrozole, ibrance, zometa, lupron  Following signatera  S/p reconstructive surgery  4/23 SBRT to liver (4 mets), regional LN's and chest wall    INTEGRATIVE HISTORY:  Symptoms:  Arthralgias - has had plantar fasciitis, flat feet  Takes 15 min before walking without pain    Anxiety - long history of anxiety    Hot flashes - 10/22 ovaries removed, started then.  Occurs several times a day, now on effexor  Now about once/night    Diet: working on it as well, has a sweet tooth  Eating breakfast more often - oats, yogurt  Inconsistent  Gained 10 lbs in last year - weights 150 lbs, 5 ft 1    PA: not very active, not exercising consistently  Growing up used to do a lot of sports, dancing    Sleep: stays up later than she should doing housework, on the phone etc.  Falls asleep easily and stays asleep ok too.  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  There were no vitals taken for this visit.    PHYSICAL EXAM:  NAD, awake/alert  HEENT, NCAT, OP clear, no oral lesions  CTA bilat  RRR no mgr  Abd soft/nt/nd+bs  No c/c/e/ttp  Motor/sensory intact, CN 2-12 intact     RESULTS:  Lab Results   Component Value Date    WBC 3.1 (L) 11/20/2023    HGB 11.9 (L) 11/20/2023    HCT 35.1 (L) 11/20/2023     11/20/2023    CREATININE 0.66 11/20/2023    AST 78 (H) 11/20/2023       Assessment/Plan   42 yo woman with h/o L breast IDC, stage IIB, pT2N1a, ER>95%, HER 2 neg, low risk mammaprint  Biopsy proven liver mets 8/22 - ER+  S/p bilat mastectomy 5/22, no adjuvant chemo initially  Current Treatment: Letrozole, ibrance, zometa, lupron  Following signatera  S/p reconstructive surgery  4/23 SBRT to liver (4 mets), regional LN's and chest wall    CANCER SPECIFIC RECCS:  Breast cancer:  Whole Foods plant based diet - Dietitian consultation  5-9 fruits/veg/day, Cruciferous Vegetables - Brussel Sprouts, Kale, Broccoli, Cauliflower  [employer] [place] [employer] \"New American Plate\"  Organic dairy preferred  Limit sugar - no sugar beverages, no sweets  Consider intermittent fasting 3-5 days per week (not eating for 13 hrs straight) - do not eat after 7 pm  Limit alcohol   Moderate soy is ok (edamame/tofu) once/day  Fiber including flax seeds beneficial    Exercise 30 min/day 5 days a week, vary by balance, cardio and resistance training  Would do it in the morning    Supplements to consider:  Turmeric (kat) - taking right now  [employer] STAMETS 7  Melatonin 1-3 mg at night 1 hr prior to sleep  Vitamin D3 6938-8589 IU/day  Omega 3 supplementation (nordic naturals)    Reading:  Anticancer Living  Cancer Fighting Kitchen    Websites:  [employer] Lifestyle Program  Cancerchoices.org  Cook for your Life    Podcast: Integrative Oncology Talk    Apps: Oncio stacie ([email or web address].org)    Support: [employer] (exercise programs, dietitian, support groups, financial support and " services)    SYMPTOM MANAGEMENT:  Arthralgias: on exemestane now  Acupuncture in [facility] symptom management clinic  Arnica tablets or cream (Boiron)  Vitamin D3 3840-5466 IU/day  [place] 3  Deep Blue (DoTerra) or PROZE NERVE  Turmeric (Meghann Turmeric Supreme)  Yoga    Hot flashes - acupuncture  Continue effexor  Moderate soy    Anxiety - can discuss breathing and meditation later    Follow up: [facility] symptom management clinic  [facility] clinic 3 months    Thank you for consulting me in for this patient  Charly Collazo MD

## 2024-01-22 NOTE — TELEPHONE ENCOUNTER
Patient has questions about upcoming schedule.  Believes Sherin and Dr. Kumar appointments should be on 2/12/24, but sees appointment with Dr. Kumar on 2/12 and again with infusion on 2/21/24.  Asking for call back- prefers morning appointments.    Patient has new insurance- transferred to Samantha PAS to update in Epic.

## 2024-01-31 ENCOUNTER — TELEPHONE (OUTPATIENT)
Dept: HEMATOLOGY/ONCOLOGY | Facility: CLINIC | Age: 44
End: 2024-01-31
Payer: COMMERCIAL

## 2024-01-31 NOTE — TELEPHONE ENCOUNTER
[facility] called to refill Ibrance.  Patent has new prescription insurance and will probably need prior authorization.    New insurance is:  [employer]  ID  [id number]  BIN [id number]  Rx group [id number]

## 2024-02-04 NOTE — PROGRESS NOTES
Patient ID: Shannon Recio is a 43 y.o. female.    Oncology History   Malignant neoplasm of overlapping sites of left breast in female, estrogen receptor positive (CMS/HCC)   10/24/2023 Initial Diagnosis    Malignant neoplasm of overlapping sites of left breast in female, estrogen receptor positive (CMS/HCC)     1/15/2024 -  Chemotherapy    Palbociclib + Aromatase Inhibitor, 28 Day Cycle       Subjective    HPI  Ms. Shannon Recio is a 42 y/o F who presents for follow-up for early left breast cancer. Currently on exemestane, Ibrance and Zometa infusions.  She occasionally feels stiff when getting out of bed. She also notices discomfort when bending over. She has not needed to take Naproxen for symptoms.    Patient's past medical history, surgical history, family history and social history reviewed.     Objective    BSA: 1.73 meters squared  /75   Pulse 70   Temp 36.1 °C (97 °F) (Temporal)   Resp 16   Wt 69.4 kg (153 lb)   SpO2 100%   BMI 28.91 kg/m²      Review of Systems:   Review of Systems:    Positive per HPI, otherwise negative.     Physical Exam:   Constitutional: Patient appears in no acute distress.   Sitting comfortably in chair.  Eyes: EOMI, clear sclera  ENMT: mucous membranes moist, no apparent injury  Head/Neck: Neck supple, no JVD  Respiratory/Thorax: Patent airways, CTAB, normal  breath sounds, no increased work of breathing  Cardiovascular: Regular, rate and rhythm, no murmurs  Gastrointestinal: Nondistended, soft, non-tender,  no rebound tenderness or guarding, no masses palpable  Extremities: normal extremities, no cyanosis edema,  no swelling  Neurological: alert and oriented x3, nonfocal, normal  speech and hearing  Lymphatic: No palpable lymphadenopathy in cervical,  axillary  lymph nodes.  Spleen appears normal size.  Psychological: Appropriate mood and behavior, normal  affect  Skin: Warm and dry, no lesions, no rashes     Performance Status:  Symptomatic; fully  ambulatory    Labs/Imaging/Pathology: personally reviewed reports and images in Epic electronic medical record system. Pertinent results as it related to the plan represented in below in assessment and plan.      Assessment/Plan   1. Left breast IDC, stage IIB [id number], ER>95% NH>95% , HER2 neg, low risk mammaprint  2. Biopsy proven metastases to liver 8/1/22, hormone positive     - initially palpated a left breast mass with nipple changes in 3/21/2021 and she was referred to Dr. Waterman - diagnostic mammogram with tomosynthesis on 3/30/2022 and there was a mass highly suspicious of malignancy in the left breast. No findings in  the right breast. She underwent subsequently a ultrasound on the same day and she was found to have an irregular spiculated hypoechoic shadowing mass measuring 1.9 x 1.6 x 2.9 cm at 1 o'clock, 3 cm from the nipple. There were 8 lymph nodes noted in the  left axilla with 2-3 lymph nodes of intermediate suspicion of involvement  - core needle biopsy on 4/11/2022 and final pathology shows invasive ductal carcinoma, grade 2, in DCIS in the breast mass. The most suspicious lymph node was also biopsied and negative for carcinoma  - 5/13/22 bilateral mastectomy. Left breast showed invasive ductal carcinoma, grade 3 with DCIS. Final tumor size was 3.4 cm. Grade 3 with no extensive intraductal component. There was indeterminant left LVI. DCIS was present. All margins were negative.  There was also macrometastatic carcinoma in 2/4 lymph nodes. End stage N1a. Given stage 2 disease, she did not have staging scans completed  - initially discussed with her low risk mammaprint despite higher clinical risk with node involvement, ultimately decided to not pursue adjuvant chemotherapy   - during CT sim for RT planning, found to have concerning breast lesion  - Liver biopsy 8/1/22 consistent with primary breast cancer  - today discussed new diagnosis of metastatic disease and overall approach to therapy and  prognosis  - Reviewed first line therapy with CDK 4/6 inhibitor plus AI inhibitor  - Regarding choice of CDK4/6 inhibitor, given liver involvement I discussed with patient that although there is some more longer survival data with ribociclib, I would be concerned about risk of hepatotoxicity as if there is no other distant disease we  could consider treatment of oligometastatic disease  - 8/2022- started Ibrance 125 mg PO daily D1-21 every 28 days with letrozole 2.5 mg PO daily  - bilateral salpingo-oophorectomy on 10/10/22  - Signatera from 11/7 0.07 MTM/mL  - complete 50 Gy in 25 fractions to her regional nodes and chest wall along with SBRT completed on 5/22/2023. She also underwent SBRT to the liver metastases from 4/4 to 4/11/2023, for a total of 54 Gy in 3 fractions.   - PET CT from 1/23/23, which shows there is no clear evidence of disease.   -  scans from 7/20/23. No new evidence of recurrence.   - imaging from 12/11/23 shows no new evidence of recurrence in the bone, chest, abdomen or pelvis.   - 2/12/24:  - Patient overall doing well on Ibrance, labs unremarkable. No contraindication to proceed with Zometa today.  - She does have some new right rib and chest pain for the last month. Will plan to evaluate further with CT of the chest.  - Will plan for a telephone visit for followup on the day after CT chest 2/27/24.    RTC for telephone visit on the day after CT chest, 2/27/24. This note has been transcribed using a medical scribe and there is a possibility of unintentional typing misprints.     Diagnoses and all orders for this visit:  Malignant neoplasm of overlapping sites of left breast in female, estrogen receptor positive (CMS/HCC)  -     Clinic Appointment Request  -     CT chest w IV contrast; Future  -     Clinic Appointment Request Virtual Est; Future    Sindhu Kumar MD  Hematology/Oncology  [facility]    Scribe Attestation  By signing my name below, I, Hector Norman,  Scribe attest that this documentation has been prepared under the direction and in the presence of Sindhu Kumar MD.

## 2024-02-12 ENCOUNTER — APPOINTMENT (OUTPATIENT)
Dept: HEMATOLOGY/ONCOLOGY | Facility: CLINIC | Age: 44
End: 2024-02-12
Payer: COMMERCIAL

## 2024-02-12 ENCOUNTER — LAB (OUTPATIENT)
Dept: LAB | Facility: CLINIC | Age: 44
End: 2024-02-12
Payer: COMMERCIAL

## 2024-02-12 ENCOUNTER — INFUSION (OUTPATIENT)
Dept: HEMATOLOGY/ONCOLOGY | Facility: CLINIC | Age: 44
End: 2024-02-12
Payer: COMMERCIAL

## 2024-02-12 ENCOUNTER — OFFICE VISIT (OUTPATIENT)
Dept: HEMATOLOGY/ONCOLOGY | Facility: CLINIC | Age: 44
End: 2024-02-12
Payer: COMMERCIAL

## 2024-02-12 VITALS
DIASTOLIC BLOOD PRESSURE: 75 MMHG | RESPIRATION RATE: 16 BRPM | HEART RATE: 70 BPM | WEIGHT: 153 LBS | BODY MASS INDEX: 28.91 KG/M2 | TEMPERATURE: 97 F | OXYGEN SATURATION: 100 % | SYSTOLIC BLOOD PRESSURE: 116 MMHG

## 2024-02-12 DIAGNOSIS — Z17.0 MALIGNANT NEOPLASM OF OVERLAPPING SITES OF LEFT BREAST IN FEMALE, ESTROGEN RECEPTOR POSITIVE (MULTI): ICD-10-CM

## 2024-02-12 DIAGNOSIS — C50.812 MALIGNANT NEOPLASM OF OVERLAPPING SITES OF LEFT BREAST IN FEMALE, ESTROGEN RECEPTOR POSITIVE (MULTI): ICD-10-CM

## 2024-02-12 LAB
ALBUMIN SERPL BCP-MCNC: 4.6 G/DL (ref 3.4–5)
ALBUMIN SERPL BCP-MCNC: 4.6 G/DL (ref 3.4–5)
ALP SERPL-CCNC: 67 U/L (ref 33–110)
ALP SERPL-CCNC: 68 U/L (ref 33–110)
ALT SERPL W P-5'-P-CCNC: 49 U/L (ref 7–45)
ALT SERPL W P-5'-P-CCNC: 50 U/L (ref 7–45)
ANION GAP SERPL CALC-SCNC: 12 MMOL/L (ref 10–20)
ANION GAP SERPL CALC-SCNC: 12 MMOL/L (ref 10–20)
AST SERPL W P-5'-P-CCNC: 30 U/L (ref 9–39)
AST SERPL W P-5'-P-CCNC: 31 U/L (ref 9–39)
BASOPHILS # BLD AUTO: 0.02 X10*3/UL (ref 0–0.1)
BASOPHILS NFR BLD AUTO: 0.9 %
BILIRUB SERPL-MCNC: 0.2 MG/DL (ref 0–1.2)
BILIRUB SERPL-MCNC: 0.2 MG/DL (ref 0–1.2)
BUN SERPL-MCNC: 21 MG/DL (ref 6–23)
BUN SERPL-MCNC: 21 MG/DL (ref 6–23)
CALCIUM SERPL-MCNC: 9.4 MG/DL (ref 8.6–10.3)
CALCIUM SERPL-MCNC: 9.4 MG/DL (ref 8.6–10.3)
CHLORIDE SERPL-SCNC: 105 MMOL/L (ref 98–107)
CHLORIDE SERPL-SCNC: 105 MMOL/L (ref 98–107)
CO2 SERPL-SCNC: 27 MMOL/L (ref 21–32)
CO2 SERPL-SCNC: 27 MMOL/L (ref 21–32)
CREAT SERPL-MCNC: 0.67 MG/DL (ref 0.5–1.05)
CREAT SERPL-MCNC: 0.68 MG/DL (ref 0.5–1.05)
EGFRCR SERPLBLD CKD-EPI 2021: >90 ML/MIN/1.73M*2
EGFRCR SERPLBLD CKD-EPI 2021: >90 ML/MIN/1.73M*2
EOSINOPHIL # BLD AUTO: 0.03 X10*3/UL (ref 0–0.7)
EOSINOPHIL NFR BLD AUTO: 1.3 %
ERYTHROCYTE [DISTWIDTH] IN BLOOD BY AUTOMATED COUNT: 14.2 % (ref 11.5–14.5)
GLUCOSE SERPL-MCNC: 109 MG/DL (ref 74–99)
GLUCOSE SERPL-MCNC: 111 MG/DL (ref 74–99)
HCT VFR BLD AUTO: 32.6 % (ref 36–46)
HGB BLD-MCNC: 11.3 G/DL (ref 12–16)
IMM GRANULOCYTES # BLD AUTO: 0 X10*3/UL (ref 0–0.7)
IMM GRANULOCYTES NFR BLD AUTO: 0 % (ref 0–0.9)
LYMPHOCYTES # BLD AUTO: 0.6 X10*3/UL (ref 1.2–4.8)
LYMPHOCYTES NFR BLD AUTO: 25.8 %
MAGNESIUM SERPL-MCNC: 2.1 MG/DL (ref 1.6–2.4)
MCH RBC QN AUTO: 35.3 PG (ref 26–34)
MCHC RBC AUTO-ENTMCNC: 34.7 G/DL (ref 32–36)
MCV RBC AUTO: 102 FL (ref 80–100)
MONOCYTES # BLD AUTO: 0.47 X10*3/UL (ref 0.1–1)
MONOCYTES NFR BLD AUTO: 20.2 %
NEUTROPHILS # BLD AUTO: 1.21 X10*3/UL (ref 1.2–7.7)
NEUTROPHILS NFR BLD AUTO: 51.8 %
PHOSPHATE SERPL-MCNC: 3.7 MG/DL (ref 2.5–4.9)
PLATELET # BLD AUTO: 162 X10*3/UL (ref 150–450)
POTASSIUM SERPL-SCNC: 4.3 MMOL/L (ref 3.5–5.3)
POTASSIUM SERPL-SCNC: 4.3 MMOL/L (ref 3.5–5.3)
PROT SERPL-MCNC: 7.2 G/DL (ref 6.4–8.2)
PROT SERPL-MCNC: 7.3 G/DL (ref 6.4–8.2)
RBC # BLD AUTO: 3.2 X10*6/UL (ref 4–5.2)
SODIUM SERPL-SCNC: 140 MMOL/L (ref 136–145)
SODIUM SERPL-SCNC: 140 MMOL/L (ref 136–145)
WBC # BLD AUTO: 2.3 X10*3/UL (ref 4.4–11.3)

## 2024-02-12 PROCEDURE — 36415 COLL VENOUS BLD VENIPUNCTURE: CPT

## 2024-02-12 PROCEDURE — 2500000004 HC RX 250 GENERAL PHARMACY W/ HCPCS (ALT 636 FOR OP/ED): Performed by: INTERNAL MEDICINE

## 2024-02-12 PROCEDURE — 83735 ASSAY OF MAGNESIUM: CPT

## 2024-02-12 PROCEDURE — 85025 COMPLETE CBC W/AUTO DIFF WBC: CPT

## 2024-02-12 PROCEDURE — 86300 IMMUNOASSAY TUMOR CA 15-3: CPT

## 2024-02-12 PROCEDURE — 99214 OFFICE O/P EST MOD 30 MIN: CPT | Performed by: INTERNAL MEDICINE

## 2024-02-12 PROCEDURE — 96361 HYDRATE IV INFUSION ADD-ON: CPT | Mod: INF

## 2024-02-12 PROCEDURE — 80053 COMPREHEN METABOLIC PANEL: CPT

## 2024-02-12 PROCEDURE — 1036F TOBACCO NON-USER: CPT | Performed by: INTERNAL MEDICINE

## 2024-02-12 PROCEDURE — 84100 ASSAY OF PHOSPHORUS: CPT

## 2024-02-12 PROCEDURE — 96365 THER/PROPH/DIAG IV INF INIT: CPT | Mod: INF

## 2024-02-12 RX ORDER — GLUCOSAMINE/CHONDRO SU A 500-400 MG
1 TABLET ORAL 3 TIMES DAILY
COMMUNITY

## 2024-02-12 RX ORDER — HEPARIN 100 UNIT/ML
500 SYRINGE INTRAVENOUS AS NEEDED
OUTPATIENT
Start: 2024-02-12

## 2024-02-12 RX ORDER — FAMOTIDINE 10 MG/ML
20 INJECTION INTRAVENOUS ONCE AS NEEDED
OUTPATIENT
Start: 2024-07-29

## 2024-02-12 RX ORDER — ALBUTEROL SULFATE 0.83 MG/ML
3 SOLUTION RESPIRATORY (INHALATION) AS NEEDED
Status: DISCONTINUED | OUTPATIENT
Start: 2024-02-12 | End: 2024-02-12 | Stop reason: HOSPADM

## 2024-02-12 RX ORDER — FAMOTIDINE 10 MG/ML
20 INJECTION INTRAVENOUS ONCE AS NEEDED
Status: DISCONTINUED | OUTPATIENT
Start: 2024-02-12 | End: 2024-02-12 | Stop reason: HOSPADM

## 2024-02-12 RX ORDER — DIPHENHYDRAMINE HYDROCHLORIDE 50 MG/ML
50 INJECTION INTRAMUSCULAR; INTRAVENOUS AS NEEDED
OUTPATIENT
Start: 2024-07-29

## 2024-02-12 RX ORDER — EPINEPHRINE 0.3 MG/.3ML
0.3 INJECTION SUBCUTANEOUS EVERY 5 MIN PRN
OUTPATIENT
Start: 2024-07-29

## 2024-02-12 RX ORDER — CHOLECALCIFEROL (VITAMIN D3) 125 MCG
3000 CAPSULE ORAL
COMMUNITY

## 2024-02-12 RX ORDER — HEPARIN SODIUM,PORCINE/PF 10 UNIT/ML
50 SYRINGE (ML) INTRAVENOUS AS NEEDED
OUTPATIENT
Start: 2024-02-12

## 2024-02-12 RX ORDER — EPINEPHRINE 0.3 MG/.3ML
0.3 INJECTION SUBCUTANEOUS EVERY 5 MIN PRN
Status: DISCONTINUED | OUTPATIENT
Start: 2024-02-12 | End: 2024-02-12 | Stop reason: HOSPADM

## 2024-02-12 RX ORDER — ALBUTEROL SULFATE 0.83 MG/ML
3 SOLUTION RESPIRATORY (INHALATION) AS NEEDED
OUTPATIENT
Start: 2024-07-29

## 2024-02-12 RX ORDER — DIPHENHYDRAMINE HYDROCHLORIDE 50 MG/ML
50 INJECTION INTRAMUSCULAR; INTRAVENOUS AS NEEDED
Status: DISCONTINUED | OUTPATIENT
Start: 2024-02-12 | End: 2024-02-12 | Stop reason: HOSPADM

## 2024-02-12 RX ADMIN — SODIUM CHLORIDE 500 ML: 9 INJECTION, SOLUTION INTRAVENOUS at 10:54

## 2024-02-12 RX ADMIN — ZOLEDRONIC ACID 4 MG: 4 INJECTION, SOLUTION, CONCENTRATE INTRAVENOUS at 11:28

## 2024-02-12 ASSESSMENT — PAIN SCALES - GENERAL: PAINLEVEL: 1

## 2024-02-14 ENCOUNTER — TELEPHONE (OUTPATIENT)
Dept: HEMATOLOGY/ONCOLOGY | Facility: CLINIC | Age: 44
End: 2024-02-14
Payer: COMMERCIAL

## 2024-02-14 LAB — CANCER AG27-29 SERPL-ACNC: 17.6 U/ML (ref 0–38.6)

## 2024-02-19 ENCOUNTER — TELEPHONE (OUTPATIENT)
Dept: HEMATOLOGY/ONCOLOGY | Facility: CLINIC | Age: 44
End: 2024-02-19
Payer: COMMERCIAL

## 2024-02-19 NOTE — TELEPHONE ENCOUNTER
I spoke with Shannon and notified her that her Sigantera was negative. She had no questions, gave verbal understanding and was appreciative of the call.

## 2024-02-21 ENCOUNTER — APPOINTMENT (OUTPATIENT)
Dept: HEMATOLOGY/ONCOLOGY | Facility: CLINIC | Age: 44
End: 2024-02-21
Payer: COMMERCIAL

## 2024-02-21 ENCOUNTER — ALLIED HEALTH (OUTPATIENT)
Dept: INTEGRATIVE MEDICINE | Facility: CLINIC | Age: 44
End: 2024-02-21
Payer: COMMERCIAL

## 2024-02-21 DIAGNOSIS — C50.812 MALIGNANT NEOPLASM OF OVERLAPPING SITES OF LEFT BREAST IN FEMALE, ESTROGEN RECEPTOR POSITIVE (MULTI): Primary | ICD-10-CM

## 2024-02-21 DIAGNOSIS — F32.A ANXIETY AND DEPRESSION: ICD-10-CM

## 2024-02-21 DIAGNOSIS — Z17.0 MALIGNANT NEOPLASM OF OVERLAPPING SITES OF LEFT BREAST IN FEMALE, ESTROGEN RECEPTOR POSITIVE (MULTI): Primary | ICD-10-CM

## 2024-02-21 DIAGNOSIS — F41.9 ANXIETY AND DEPRESSION: ICD-10-CM

## 2024-02-21 PROCEDURE — 99214 OFFICE O/P EST MOD 30 MIN: CPT | Performed by: HOSPITALIST

## 2024-02-21 ASSESSMENT — ENCOUNTER SYMPTOMS
VOMITING: 0
CHILLS: 0
WHEEZING: 0
HEADACHES: 1
DIARRHEA: 0
PALPITATIONS: 0
DIFFICULTY URINATING: 0
FEVER: 0
NAUSEA: 0

## 2024-02-21 NOTE — PROGRESS NOTES
Acupuncture Visit:     Subjective   Patient ID: Shannon Recio is a 43 y.o. female who presents for No chief complaint on file.  Joint pain-  Feet knees and left hip  Stiffness, worst first thing in AM  Pain up  up and down stairs., popping  AM- 3-4/10 down down to 1/10  Some upper body discomfort  Not keeping up at night    Hot flashes-   Better than 1 year ago with oophorectomy  Several a day  Not happening at night, but uses fan  10x a day  A few minutes  From head down.   Effexor 75 mg,  Turmeric, tart cherry    Anxiety-    Overall for many years,  uses Buspar BID  Propanalol- for migraines, but also helps anxiety  Yoga in past, limited now  Walks, read,.     Sleep- falls aleep well,  going to bed later than she should  Bed 11-12, wake 6-630  Energy- moderate, needs caffeine in AM, lower when she rests    Diet:  B: oats, cottage cheese shake, yogurt,  L: frozen meal, salad, chicken pot pie  D: take out, grilled chicken veggies,   SN: junk, chips cookies.   Dr: water, stur flavor, tea-  black,    BM: daily, no issues.                  Pre-treatment Assessment  Pain Score: 1  Anxiety Level (0-10): 3  Stress Level (0-10): 3  Coping Level (0-10): 7  Depression Level (0-10): 1  Fatigue Level (0-10): 2  Nausea Level (0-10): 0  Wellbeing Level (0-10): 4    Review of Systems   Constitutional:  Negative for chills and fever.   Eyes:  Negative for visual disturbance.   Respiratory:  Negative for wheezing.    Cardiovascular:  Negative for palpitations.   Gastrointestinal:  Negative for diarrhea, nausea and vomiting.   Genitourinary:  Negative for difficulty urinating.   Skin:         Dry skin, ecxzema   Neurological:  Positive for headaches (migraInes, 3-4 a month).            Provider reviewed plan for the acupuncture session, precautions and contraindications. Patient/guardian/hospital staff has given consent to treat with full understanding of what to expect during the session. Before acupuncture began, provider  explained to the patient to communicate at any time if the procedure was causing discomfort past their tolerance level. Patient agreed to advise acupuncturist. The acupuncturist counseled the patient on the risks of acupuncture treatment including pain, infection, bleeding, and no relief of pain. The patient was positioned comfortably. There was no evidence of infection at the site of needle insertions.    Objective   Physical Exam              Acupuncture Treatment  Needle Guage: 36 guage /.20/ Blue seirin  Body Points - Bilateral: Du 20, Yin tong, LI11, LI4, SI3, SP6, KI6, UB62, LR3, St44  Auricular Points: Yes  Auricular Points - Bilateral: peter men, PT0  Needle Count In: 22  Needle Count Out: 22  Needle Retention Time (min): 25 minutes  Total Face to Face Time (min): 45 minutes         Post-treatment Assessment  Pain Score: 1  Anxiety Level (0-10): 1  Stress Level (0-10): 2  Coping Level (0-10): 7  Depression Level (0-10): 1  Fatigue Level (0-10): 1  Nausea Level (0-10): 0  Wellbeing Level (0-10): 3    Assessment/Plan

## 2024-02-21 NOTE — PROGRESS NOTES
"Patient ID: Shannon Recio is a 43 y.o. female.  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  Occurs several times a day, now on effexor  Now about once/night     Diet: working on it as well, has a sweet tooth  Eating breakfast more often - oats, yogurt  Inconsistent  Gained 10 lbs in last year - weights 150 lbs, 5 ft 1     PA: not very active, not exercising consistently  Growing up used to do a lot of sports, dancing    Sleep: stays up later than she should doing housework, on the phone etc.  Falls asleep easily and stays asleep ok too.  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  There were no vitals taken for this visit.    PHYSICAL EXAM:  NAD, awake/alert  HEENT, NCAT, OP clear, no oral lesions  CTA bilat  RRR no mgr  Abd soft/nt/nd+bs  No c/c/e/ttp  Motor/sensory intact, CN 2-12 intact     RESULTS:  Lab Results   Component Value Date    " "WBC 2.3 (L) 02/12/2024    HGB 11.3 (L) 02/12/2024    HCT 32.6 (L) 02/12/2024     02/12/2024    CREATININE 0.68 02/12/2024    CREATININE 0.67 02/12/2024    AST 31 02/12/2024    AST 30 02/12/2024       Assessment/Plan   Cancer Staging   No matching staging information was found for the patient.    CANCER SPECIFIC RECCS:  44 yo woman with h/o L breast IDC, stage IIB, pT2N1a, ER>95%, HER 2 neg, low risk mammaprint  Biopsy proven liver mets 8/22 - ER+  S/p bilat mastectomy 5/22, no adjuvant chemo initially  Current Treatment: Letrozole, ibrance, zometa, lupron  Following signatera  S/p reconstructive surgery  4/23 SBRT to liver (4 mets), regional LN's and chest wall     CANCER SPECIFIC RECCS:  Breast cancer:  Whole Foods plant based diet - Dietitian consultation  5-9 fruits/veg/day, Cruciferous Vegetables - Brussel Sprouts, Kale, Broccoli, Cauliflower  [employer] \"New American Plate\"  Organic dairy preferred  Limit sugar - no sugar beverages, no sweets  Consider intermittent fasting 3-5 days per week (not eating for 13 hrs straight) - do not eat after 7 pm  Limit alcohol   Moderate soy is ok (edamame/tofu) once/day  Fiber including flax seeds beneficial     Exercise 30 min/day 5 days a week, vary by balance, cardio and resistance training  Would do it in the morning     Supplements to consider:  Turmeric (kat) - taking right now  [employer] STAMETS 7  Melatonin 1-3 mg at night 1 hr prior to sleep  Vitamin D3 9084-8235 IU/day  Omega 3 supplementation (nordic naturals)     Reading:  Anticancer Living  Cancer Fighting Kitchen     Websites:  Anticancer Lifestyle Program  Cancerchoices.org  Cook for your Life     Podcast: Integrative Oncology Talk     Apps: Oncio stacie ([email or web address])     Support: [employer] (exercise programs, dietitian, support groups, financial support and services)     SYMPTOM MANAGEMENT:  Arthralgias: on exemestane now  Acupuncture in [facility] symptom management " clinic  Arnica tablets or cream (Boiron)  Vitamin D3 [id number] IU/day  [place] 3  Deep Blue (DoTerra) or PROZE NERVE  Turmeric (Meghann Turmeric Supreme)  Yoga     Hot flashes - acupuncture  Continue effexor  Moderate soy     Anxiety - can discuss breathing and meditation later     Follow up: [facility] clinic  [facility] clinic 3 months    SYMPTOM MANAGEMENT:  Integrative Oncology Symptom Management:    The [facility] Integrative Oncology Symptom Management clinic offers multi-disciplinary supervised care of cancer patients using Integrative Modalities billed to insurance using NCCN and SIO/ASCO guideline-driven practices.  ESAS is obtained prior to and after each treatment by the practitioner    Symptoms Managed:  Arthralgias - no change yet    Hot Flashes - no change yet    Anxiety - recently father fell and hit head and causing stress, in rehab    Natural Products utilized:      Integrative Treatment: Acupuncture  Session #: 1  Frequency: weekly    Referrals:   Recommendations:    Follow Up:  Symptom Management: weekly  Integrative Oncology:     I have personally seen the patient and supervised the treatment by the integrative practitioner during this visit.  Pt had symptoms discussed and I was present for the patient's 60 minutes of direct patient care.     Charly Collazo MD

## 2024-02-22 ASSESSMENT — PAIN SCALES - GENERAL: PAINLEVEL_OUTOF10: 1

## 2024-02-26 ENCOUNTER — HOSPITAL ENCOUNTER (OUTPATIENT)
Dept: RADIOLOGY | Facility: CLINIC | Age: 44
Discharge: HOME | End: 2024-02-26
Payer: COMMERCIAL

## 2024-02-26 DIAGNOSIS — Z17.0 MALIGNANT NEOPLASM OF OVERLAPPING SITES OF LEFT BREAST IN FEMALE, ESTROGEN RECEPTOR POSITIVE (MULTI): ICD-10-CM

## 2024-02-26 DIAGNOSIS — C50.812 MALIGNANT NEOPLASM OF OVERLAPPING SITES OF LEFT BREAST IN FEMALE, ESTROGEN RECEPTOR POSITIVE (MULTI): ICD-10-CM

## 2024-02-26 PROCEDURE — 71260 CT THORAX DX C+: CPT

## 2024-02-26 PROCEDURE — 2550000001 HC RX 255 CONTRASTS: Performed by: INTERNAL MEDICINE

## 2024-02-26 PROCEDURE — 71260 CT THORAX DX C+: CPT | Performed by: RADIOLOGY

## 2024-02-26 RX ADMIN — IOHEXOL 70 ML: 350 INJECTION, SOLUTION INTRAVENOUS at 11:30

## 2024-03-10 NOTE — PROGRESS NOTES
Consent:  Verbal consent was requested and obtained from patient on this date for a telehealth visit.    Patient ID: Shannon Recio is a 43 y.o. female.    Oncology History   Malignant neoplasm of overlapping sites of left breast in female, estrogen receptor positive (CMS/HCC)   10/24/2023 Initial Diagnosis    Malignant neoplasm of overlapping sites of left breast in female, estrogen receptor positive (CMS/HCC)     1/15/2024 -  Chemotherapy    Palbociclib + Aromatase Inhibitor, 28 Day Cycle       Subjective    HPI  A telephone visit (audio only) between the patient at home and the provider at [facility] was utilized to provide this   telehealth service.      Ms. Shannon Recio is a 44 y/o F who presents for follow-up for left breast cancer. Currently on exemestane, Ibrance and Zometa infusions.  Here for follow-up after CT chest on 2/26/2024 with no obvious metastatic disease noted.     Most recent Signatera, CBC and CMP all unremarkable. LFT's are improving.     Since last visit, patient reports that she overall she has been feeling well with good energy and good appetite. No complaints today. Patient states that she saw Dr. Collazo recently and has started acupuncture for her arthritis with no significant improvement noted yet.     Patient's past medical history, surgical history, family history and social history reviewed.     Objective    There were no vitals taken for this visit.     Review of Systems:   Review of Systems:    Positive per HPI, otherwise negative.     Physical Exam:   Exam deferred due to telephone call    Performance Status:  Symptomatic; fully ambulatory    Labs/Imaging/Pathology: personally reviewed reports and images in Epic electronic medical record system. Pertinent results as it related to the plan represented in below in assessment and plan.      Assessment/Plan    1. Left breast IDC, stage IIB [id number], ER>95% DE>95% , HER2 neg, low risk mammaprint  2. Biopsy  proven metastases to liver 8/1/22, hormone positive     - initially palpated a left breast mass with nipple changes in 3/21/2021 and she was referred to Dr. Waterman - diagnostic mammogram with tomosynthesis on 3/30/2022 and there was a mass highly suspicious of malignancy in the left breast. No findings in  the right breast. She underwent subsequently a ultrasound on the same day and she was found to have an irregular spiculated hypoechoic shadowing mass measuring 1.9 x 1.6 x 2.9 cm at 1 o'clock, 3 cm from the nipple. There were 8 lymph nodes noted in the  left axilla with 2-3 lymph nodes of intermediate suspicion of involvement  - core needle biopsy on 4/11/2022 and final pathology shows invasive ductal carcinoma, grade 2, in DCIS in the breast mass. The most suspicious lymph node was also biopsied and negative for carcinoma  - 5/13/22 bilateral mastectomy. Left breast showed invasive ductal carcinoma, grade 3 with DCIS. Final tumor size was 3.4 cm. Grade 3 with no extensive intraductal component. There was indeterminant left LVI. DCIS was present. All margins were negative.  There was also macrometastatic carcinoma in 2/4 lymph nodes. End stage N1a. Given stage 2 disease, she did not have staging scans completed  - initially discussed with her low risk mammaprint despite higher clinical risk with node involvement, ultimately decided to not pursue adjuvant chemotherapy   - during CT sim for RT planning, found to have concerning breast lesion  - Liver biopsy 8/1/22 consistent with primary breast cancer  - today discussed new diagnosis of metastatic disease and overall approach to therapy and prognosis  - Reviewed first line therapy with CDK 4/6 inhibitor plus AI inhibitor  - Regarding choice of CDK4/6 inhibitor, given liver involvement I discussed with patient that although there is some more longer survival data with ribociclib, I would be concerned about risk of hepatotoxicity as if there is no other distant  disease we  could consider treatment of oligometastatic disease  - 8/2022- started Ibrance 125 mg PO daily D1-21 every 28 days with letrozole 2.5 mg PO daily  - bilateral salpingo-oophorectomy on 10/10/22  - Signatera from 11/7 0.07 MTM/mL  - complete 50 Gy in 25 fractions to her regional nodes and chest wall along with SBRT completed on 5/22/2023. She also underwent SBRT to the liver metastases from 4/4 to 4/11/2023, for a total of 54 Gy in 3 fractions.   - PET CT from 1/23/23, which shows there is no clear evidence of disease.   -  scans from 7/20/23. No new evidence of recurrence.   - imaging from 12/11/23 shows no new evidence of recurrence in the bone, chest, abdomen or pelvis.   - 2/12/24:  - Patient overall doing well on Ibrance, labs unremarkable. No contraindication to proceed with Zometa today.  - She does have some new right rib and chest pain for the last month. Will plan to evaluate further with CT of the chest.  - Will plan for a telephone visit for followup on the day after CT chest 2/27/24.    3/11/24: Telephone call  - Overall, CT chest from 2/26/24 is unremarkable.   - Blood work from 2/12/24 also unremarkable.   - At this point, we will make no changes and plan for repeat blood work in 3 months.  - RTC in 3 months.     RTC in 3 months. This note has been transcribed using a medical scribe and there is a possibility of unintentional typing misprints.      Diagnoses and all orders for this visit:  Malignant neoplasm of overlapping sites of left breast in female, estrogen receptor positive (CMS/HCC)  -     Clinic Appointment Request; Future  -     Oncology Line Draw Appointment Request; Future  -     CBC and Auto Differential; Future  -     Comprehensive metabolic panel; Future  -     Cancer Antigen 27-29; Future  -     signatera; Other-indicate in comment; unknown - Miscellaneous Test; Future    Sindhu Kumar MD  Hematology/Oncology  [facility]    Scribe Attestation  By  signing my name below, I, Steven Sheldon attest that this documentation has been prepared under the direction and in the presence of Sindhu Kumar MD.

## 2024-03-11 ENCOUNTER — TELEMEDICINE (OUTPATIENT)
Dept: HEMATOLOGY/ONCOLOGY | Facility: CLINIC | Age: 44
End: 2024-03-11
Payer: COMMERCIAL

## 2024-03-11 DIAGNOSIS — C50.812 MALIGNANT NEOPLASM OF OVERLAPPING SITES OF LEFT BREAST IN FEMALE, ESTROGEN RECEPTOR POSITIVE (MULTI): Primary | ICD-10-CM

## 2024-03-11 DIAGNOSIS — Z17.0 MALIGNANT NEOPLASM OF OVERLAPPING SITES OF LEFT BREAST IN FEMALE, ESTROGEN RECEPTOR POSITIVE (MULTI): Primary | ICD-10-CM

## 2024-03-11 PROCEDURE — 1036F TOBACCO NON-USER: CPT | Performed by: INTERNAL MEDICINE

## 2024-03-11 PROCEDURE — 99213 OFFICE O/P EST LOW 20 MIN: CPT | Performed by: INTERNAL MEDICINE

## 2024-04-08 ENCOUNTER — APPOINTMENT (OUTPATIENT)
Dept: INTEGRATIVE MEDICINE | Facility: CLINIC | Age: 44
End: 2024-04-08
Payer: COMMERCIAL

## 2024-04-17 ENCOUNTER — ALLIED HEALTH (OUTPATIENT)
Dept: INTEGRATIVE MEDICINE | Facility: CLINIC | Age: 44
End: 2024-04-17

## 2024-04-17 DIAGNOSIS — C50.812 MALIGNANT NEOPLASM OF OVERLAPPING SITES OF LEFT BREAST IN FEMALE, ESTROGEN RECEPTOR POSITIVE (MULTI): Primary | ICD-10-CM

## 2024-04-17 DIAGNOSIS — F41.9 ANXIETY AND DEPRESSION: ICD-10-CM

## 2024-04-17 DIAGNOSIS — Z17.0 MALIGNANT NEOPLASM OF OVERLAPPING SITES OF LEFT BREAST IN FEMALE, ESTROGEN RECEPTOR POSITIVE (MULTI): Primary | ICD-10-CM

## 2024-04-17 DIAGNOSIS — F32.A ANXIETY AND DEPRESSION: ICD-10-CM

## 2024-04-17 PROCEDURE — 97139 UNLISTED THERAPEUTIC PX: CPT | Performed by: NATUROPATH

## 2024-04-17 ASSESSMENT — ENCOUNTER SYMPTOMS
FEVER: 0
DIARRHEA: 0
DIFFICULTY URINATING: 0
CHILLS: 0
PALPITATIONS: 0
WHEEZING: 0
HEADACHES: 1
NAUSEA: 0
VOMITING: 0

## 2024-04-17 NOTE — PROGRESS NOTES
Acupuncture Visit:     Subjective   Patient ID: Shannon Recio is a 43 y.o. female who presents for No chief complaint on file.  Less pain in general-  Less in AM when she wakes    Anxiety is similar    Hot flashes- similar- not bad at night , often during the day at work.     Sleep- OK.    Initial visit:  Joint pain-  Feet knees and left hip  Stiffness, worst first thing in AM  Pain up  up and down stairs., popping  AM- 3-4/10 down down to 1/10  Some upper body discomfort  Not keeping up at night    Hot flashes-   Better than 1 year ago with oophorectomy  Several a day  Not happening at night, but uses fan  10x a day  A few minutes  From head down.   Effexor 75 mg,  Turmeric, tart cherry    Anxiety-    Overall for many years,  uses Buspar BID  Propanalol- for migraines, but also helps anxiety  Yoga in past, limited now  Walks, read,.     Sleep- falls aleep well,  going to bed later than she should  Bed 11-12, wake 6-630  Energy- moderate, needs caffeine in AM, lower when she rests    Diet:  B: oats, cottage cheese shake, yogurt,  L: frozen meal, salad, chicken pot pie  D: take out, grilled chicken veggies,   SN: junk, chips cookies.   Dr: water, stur flavor, tea-  black,    BM: daily, no issues.                  Pre-treatment Assessment  Pain Score: 2  Anxiety Level (0-10): 6  Stress Level (0-10): 7  Coping Level (0-10): 4  Depression Level (0-10): 6  Fatigue Level (0-10): 4  Nausea Level (0-10): 0  Wellbeing Level (0-10): 6    Review of Systems   Constitutional:  Negative for chills and fever.   Eyes:  Negative for visual disturbance.   Respiratory:  Negative for wheezing.    Cardiovascular:  Negative for palpitations.   Gastrointestinal:  Negative for diarrhea, nausea and vomiting.   Genitourinary:  Negative for difficulty urinating.   Skin:         Dry skin, ecxzema   Neurological:  Positive for headaches (migraInes, 3-4 a month).            Provider reviewed plan for the acupuncture session, precautions  and contraindications. Patient/guardian/hospital staff has given consent to treat with full understanding of what to expect during the session. Before acupuncture began, provider explained to the patient to communicate at any time if the procedure was causing discomfort past their tolerance level. Patient agreed to advise acupuncturist. The acupuncturist counseled the patient on the risks of acupuncture treatment including pain, infection, bleeding, and no relief of pain. The patient was positioned comfortably. There was no evidence of infection at the site of needle insertions.    Objective   Physical Exam       Acupuncture Treatment  Needle Guage: 36 guage /.20/ Blue seirin  Body Points - Bilateral: Du 20, Yin tong, LI11, LI4, SI3, SP6, KI6, UB62, LR3, St44  Auricular Points: Yes  Auricular Points - Bilateral: peter men, PT0  Needle Count In: 22  Needle Count Out: 22  Needle Retention Time (min): 25 minutes  Total Face to Face Time (min): 45 minutes                 Post-treatment Assessment  Pain Score: 2  Anxiety Level (0-10): 5  Stress Level (0-10): 5  Coping Level (0-10): 5  Depression Level (0-10): 5  Fatigue Level (0-10): 3  Nausea Level (0-10): 0  Wellbeing Level (0-10): 5    Assessment/Plan   Diagnoses and all orders for this visit:  Malignant neoplasm of overlapping sites of left breast in female, estrogen receptor positive (Multi) (Primary)  Anxiety and depression

## 2024-04-19 ASSESSMENT — PAIN SCALES - GENERAL: PAINLEVEL_OUTOF10: 2

## 2024-04-29 ENCOUNTER — APPOINTMENT (OUTPATIENT)
Dept: INTEGRATIVE MEDICINE | Facility: CLINIC | Age: 44
End: 2024-04-29
Payer: COMMERCIAL

## 2024-05-06 DIAGNOSIS — C50.812 MALIGNANT NEOPLASM OF OVERLAPPING SITES OF LEFT BREAST IN FEMALE, ESTROGEN RECEPTOR POSITIVE (MULTI): ICD-10-CM

## 2024-05-06 DIAGNOSIS — Z17.0 MALIGNANT NEOPLASM OF OVERLAPPING SITES OF LEFT BREAST IN FEMALE, ESTROGEN RECEPTOR POSITIVE (MULTI): ICD-10-CM

## 2024-05-06 RX ORDER — EXEMESTANE 25 MG/1
25 TABLET ORAL DAILY
Qty: 30 TABLET | Refills: 3 | Status: SHIPPED | OUTPATIENT
Start: 2024-05-06 | End: 2024-06-03 | Stop reason: SDUPTHER

## 2024-05-15 ENCOUNTER — ALLIED HEALTH (OUTPATIENT)
Dept: INTEGRATIVE MEDICINE | Facility: CLINIC | Age: 44
End: 2024-05-15

## 2024-05-15 DIAGNOSIS — C50.812 MALIGNANT NEOPLASM OF OVERLAPPING SITES OF LEFT BREAST IN FEMALE, ESTROGEN RECEPTOR POSITIVE (MULTI): Primary | ICD-10-CM

## 2024-05-15 DIAGNOSIS — Z17.0 MALIGNANT NEOPLASM OF OVERLAPPING SITES OF LEFT BREAST IN FEMALE, ESTROGEN RECEPTOR POSITIVE (MULTI): Primary | ICD-10-CM

## 2024-05-15 DIAGNOSIS — F32.A ANXIETY AND DEPRESSION: ICD-10-CM

## 2024-05-15 DIAGNOSIS — F41.9 ANXIETY AND DEPRESSION: ICD-10-CM

## 2024-05-15 PROCEDURE — 97139 UNLISTED THERAPEUTIC PX: CPT | Performed by: NATUROPATH

## 2024-05-15 ASSESSMENT — ENCOUNTER SYMPTOMS
VOMITING: 0
DIARRHEA: 0
WHEEZING: 0
NAUSEA: 0
CHILLS: 0
PALPITATIONS: 0
DIFFICULTY URINATING: 0
FEVER: 0
HEADACHES: 1

## 2024-05-15 NOTE — PROGRESS NOTES
Acupuncture Visit:     Subjective   Patient ID: Shannon Recio is a 43 y.o. female who presents for No chief complaint on file.  No major changes  Anxiety continues, stress  Sleep no change  Hot flashes same    Initial visit:  Joint pain-  Feet knees and left hip  Stiffness, worst first thing in AM  Pain up  up and down stairs., popping  AM- 3-4/10 down down to 1/10  Some upper body discomfort  Not keeping up at night    Hot flashes-   Better than 1 year ago with oophorectomy  Several a day  Not happening at night, but uses fan  10x a day  A few minutes  From head down.   Effexor 75 mg,  Turmeric, tart cherry    Anxiety-    Overall for many years,  uses Buspar BID  Propanalol- for migraines, but also helps anxiety  Yoga in past, limited now  Walks, read,.     Sleep- falls aleep well,  going to bed later than she should  Bed 11-12, wake 6-630  Energy- moderate, needs caffeine in AM, lower when she rests    Diet:  B: oats, cottage cheese shake, yogurt,  L: frozen meal, salad, chicken pot pie  D: take out, grilled chicken veggies,   SN: junk, chips cookies.   Dr: water, stur flavor, tea-  black,    BM: daily, no issues.                       Review of Systems   Constitutional:  Negative for chills and fever.   Eyes:  Negative for visual disturbance.   Respiratory:  Negative for wheezing.    Cardiovascular:  Negative for palpitations.   Gastrointestinal:  Negative for diarrhea, nausea and vomiting.   Genitourinary:  Negative for difficulty urinating.   Skin:         Dry skin, ecxzema   Neurological:  Positive for headaches (migraInes, 3-4 a month).            Provider reviewed plan for the acupuncture session, precautions and contraindications. Patient/guardian/hospital staff has given consent to treat with full understanding of what to expect during the session. Before acupuncture began, provider explained to the patient to communicate at any time if the procedure was causing discomfort past their tolerance level.  Patient agreed to advise acupuncturist. The acupuncturist counseled the patient on the risks of acupuncture treatment including pain, infection, bleeding, and no relief of pain. The patient was positioned comfortably. There was no evidence of infection at the site of needle insertions.    Objective   Physical Exam       Acupuncture Treatment  Needle Guage: 36 guage /.20/ Blue seirin  Body Points - Bilateral: Mary peter yanira, LI11, LI4, HT7, SP6, KI6, LR3, LR2  Auricular Points: Yes  Auricular Points - Bilateral: KI, LR  Needle Count In: 22  Needle Count Out: 22  Needle Retention Time (min): 25 minutes  Total Face to Face Time (min): 25 minutes                      Assessment/Plan   Diagnoses and all orders for this visit:  Malignant neoplasm of overlapping sites of left breast in female, estrogen receptor positive (Multi) (Primary)  Anxiety and depression

## 2024-05-23 ENCOUNTER — OFFICE VISIT (OUTPATIENT)
Dept: PRIMARY CARE | Facility: CLINIC | Age: 44
End: 2024-05-23
Payer: COMMERCIAL

## 2024-05-23 VITALS
HEART RATE: 73 BPM | WEIGHT: 159.8 LBS | DIASTOLIC BLOOD PRESSURE: 79 MMHG | SYSTOLIC BLOOD PRESSURE: 115 MMHG | HEIGHT: 61 IN | TEMPERATURE: 97.3 F | BODY MASS INDEX: 30.17 KG/M2

## 2024-05-23 DIAGNOSIS — R40.0 DAYTIME SOMNOLENCE: ICD-10-CM

## 2024-05-23 DIAGNOSIS — R06.83 SNORING: Primary | ICD-10-CM

## 2024-05-23 DIAGNOSIS — M72.2 BILATERAL PLANTAR FASCIITIS: ICD-10-CM

## 2024-05-23 DIAGNOSIS — C50.912 BREAST CANCER METASTASIZED TO LIVER, LEFT (MULTI): ICD-10-CM

## 2024-05-23 DIAGNOSIS — C78.7 BREAST CANCER METASTASIZED TO LIVER, LEFT (MULTI): ICD-10-CM

## 2024-05-23 PROCEDURE — 99213 OFFICE O/P EST LOW 20 MIN: CPT | Performed by: FAMILY MEDICINE

## 2024-05-23 PROCEDURE — 1036F TOBACCO NON-USER: CPT | Performed by: FAMILY MEDICINE

## 2024-05-23 ASSESSMENT — PATIENT HEALTH QUESTIONNAIRE - PHQ9
10. IF YOU CHECKED OFF ANY PROBLEMS, HOW DIFFICULT HAVE THESE PROBLEMS MADE IT FOR YOU TO DO YOUR WORK, TAKE CARE OF THINGS AT HOME, OR GET ALONG WITH OTHER PEOPLE: NOT DIFFICULT AT ALL
1. LITTLE INTEREST OR PLEASURE IN DOING THINGS: NOT AT ALL
SUM OF ALL RESPONSES TO PHQ9 QUESTIONS 1 AND 2: 1
2. FEELING DOWN, DEPRESSED OR HOPELESS: SEVERAL DAYS

## 2024-05-23 NOTE — PROGRESS NOTES
"Subjective   Patient ID: Shannon Recio is a 43 y.o. female who presents for Snoring (Increased snoring, feeling tired more than usual).    HPI   Patient with history of metastatic breast cancer and PVCs presents today with the followin.Snoring-Increased snoring x1 year, occasionally wakes with choking sensation. Feels poorly rested in am.  Drowsy during day.  Could easily fall asleep if watching television or reading book.  Has had 10 pound weight gain in past 1 year.  2. Heel pain-bilateral, worse if poorly supportive footwear.  Review of Systems    Objective   /79 (BP Location: Right arm, Patient Position: Sitting, BP Cuff Size: Large adult)   Pulse 73   Temp 36.3 °C (97.3 °F)   Ht 1.549 m (5' 1\")   Wt 72.5 kg (159 lb 12.8 oz)   BMI 30.19 kg/m²     Physical Exam  Constitutional:       Appearance: Normal appearance.   HENT:      Head: Normocephalic and atraumatic.      Right Ear: Tympanic membrane and ear canal normal.      Left Ear: Tympanic membrane and ear canal normal.      Nose: Nose normal. No congestion or rhinorrhea.      Mouth/Throat:      Mouth: Mucous membranes are moist.      Pharynx: Oropharynx is clear. No pharyngeal swelling.   Eyes:      Extraocular Movements: Extraocular movements intact.      Conjunctiva/sclera: Conjunctivae normal.   Cardiovascular:      Rate and Rhythm: Normal rate and regular rhythm.      Pulses: Normal pulses.      Heart sounds: Normal heart sounds. No murmur heard.  Pulmonary:      Effort: Pulmonary effort is normal. No respiratory distress.      Breath sounds: Normal breath sounds. No stridor.   Musculoskeletal:      Cervical back: Neck supple.      Right lower leg: No edema.      Left lower leg: No edema.      Right foot: Tenderness present.      Left foot: Tenderness present.      Comments: Pain with palpation over the medial plantar arch and calcaneus bilaterally with increased pain with dorsiflexion.  Symptoms worse on right foot versus left foot. "   Lymphadenopathy:      Cervical: No cervical adenopathy.   Neurological:      Mental Status: She is alert.         Assessment/Plan   Diagnoses and all orders for this visit:  Snoring  -     Home sleep apnea test (HSAT); Future  Breast cancer metastasized to liver, left (Multi)  Continue to follow with oncology  Daytime somnolence  Proceed with home sleep study  -     Home sleep apnea test (HSAT); Future  Bilateral plantar fasciitis  Patient has some home exercises to work on for plantar fasciitis including heel stretches.  Discussed icing, cross massage.  Call for PT referral if symptoms do not improve

## 2024-05-31 NOTE — PROGRESS NOTES
Patient ID: Shannon Recio is a 43 y.o. female.  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 Shannon Recio is a 42 y/o F who presents for follow-up for left breast cancer. She is on Ibrance. Blood work from today is pending.     Patient is doing well today. She denies new issues with Ibrance with energy, rashes, or unexplained bruises, although, she reports pain, fatigue, and thinning hair. She is using Durmitor and states her hair is curly when it used to be straight. Notes itchiness and takes zyrtec. Also, she has been taking supplements. She has been seeing Dr. Collazo, integrative specialist. Reports abnormal facial hair growth. She has a family history of PCOS. She has heel pain from plantar fascitis, seeing a physiotherapist for this.      Patient's past medical history, surgical history, family history and social history reviewed.    Review of Systems:   Review of Systems:    Positive per HPI, otherwise negative.     Objective      /73   Pulse 74   Temp 36.4 °C (97.5 °F)   Resp 16   Wt 72.2 kg (159 lb 2.8 oz)   SpO2 96%   BMI 30.08 kg/m²      Physical Exam  Gen: appears well in clinic, NAD  HEENT: atraumatic head, normocephalic, EOMI, conjunctiva normal  LUNG: no increased WOB, CTAB  CV: No JVD. RRR  GI: soft, NT, ND  LE: no LE edema  Skin: no obvious rashes or lesions on visible skin  Neuro: interactive, no focal deficits noted  Psych: normal mood and affect    Performance Status:  Symptomatic; fully ambulatory    Labs/Imaging/Pathology: personally reviewed reports and images in Epic electronic medical record system. Pertinent results as it related to the plan represented in  below in assessment and plan.     Assessment/Plan   1. Left breast IDC, stage IIB [id number], ER>95% CA>95% , HER2 neg, low risk mammaprint  2. Biopsy proven metastases to liver 8/1/22, hormone positive     - initially palpated a left breast mass with nipple changes in 3/21/2021 and she was referred to Dr. Waterman - diagnostic mammogram with tomosynthesis on 3/30/2022 and there was a mass highly suspicious of malignancy in the left breast. No findings in  the right breast. She underwent subsequently a ultrasound on the same day and she was found to have an irregular spiculated hypoechoic shadowing mass measuring 1.9 x 1.6 x 2.9 cm at 1 o'clock, 3 cm from the nipple. There were 8 lymph nodes noted in the  left axilla with 2-3 lymph nodes of intermediate suspicion of involvement  - core needle biopsy on 4/11/2022 and final pathology shows invasive ductal carcinoma, grade 2, in DCIS in the breast mass. The most suspicious lymph node was also biopsied and negative for carcinoma  - 5/13/22 bilateral mastectomy. Left breast showed invasive ductal carcinoma, grade 3 with DCIS. Final tumor size was 3.4 cm. Grade 3 with no extensive intraductal component. There was indeterminant left LVI. DCIS was present. All margins were negative.  There was also macrometastatic carcinoma in 2/4 lymph nodes. End stage N1a. Given stage 2 disease, she did not have staging scans completed  - initially discussed with her low risk mammaprint despite higher clinical risk with node involvement, ultimately decided to not pursue adjuvant chemotherapy   - during CT sim for RT planning, found to have concerning breast lesion  - Liver biopsy 8/1/22 consistent with primary breast cancer  - today discussed new diagnosis of metastatic disease and overall approach to therapy and prognosis  - Reviewed first line therapy with CDK 4/6 inhibitor plus AI inhibitor  - Regarding choice of CDK4/6 inhibitor, given liver involvement I discussed with patient that  although there is some more longer survival data with ribociclib, I would be concerned about risk of hepatotoxicity as if there is no other distant disease we  could consider treatment of oligometastatic disease  - 8/2022- started Ibrance 125 mg PO daily D1-21 every 28 days with letrozole 2.5 mg PO daily  - bilateral salpingo-oophorectomy on 10/10/22  - Signatera from 11/7 0.07 MTM/mL  - complete 50 Gy in 25 fractions to her regional nodes and chest wall along with SBRT completed on 5/22/2023. She also underwent SBRT to the liver metastases from 4/4 to 4/11/2023, for a total of 54 Gy in 3 fractions.   - PET CT from 1/23/23, which shows there is no clear evidence of disease.   -  scans from 7/20/23. No new evidence of recurrence.   - imaging from 12/11/23 shows no new evidence of recurrence in the bone, chest, abdomen or pelvis.   - 2/12/24:  - Patient overall doing well on Ibrance, labs unremarkable. No contraindication to proceed with Zometa today.  - She does have some new right rib and chest pain for the last month. Will plan to evaluate further with CT of the chest.  - Will plan for a telephone visit for followup on the day after CT chest 2/27/24.     3/11/24: Telephone call  - Overall, CT chest from 2/26/24 is unremarkable.   - Blood work from 2/12/24 also unremarkable.   - At this point, we will make no changes and plan for repeat blood work in 3 months.  - RTC in 3 months.     6/3/24:   - No remarkable findings on exam today.  - Labs today are pending.  - Overall tolerating Ibrance  - She has started these supplements and met with Dr. Collazo.  - Refills provided for Ibrance and exemestane.  - Today, she is worried about her hair loss. Recently started topical minoxidil.  - We'll check testosterone level as she is does have a family history of PCOS.  - I will also add spironolactone 50 mg daily, which we can increase to 100 mg if she tolerates it.  - Plan for PET scan in three months. It'll be two years at that  point of being on a CDK 46 inhibitor.  - We could consider stopping the Ibrance and continuing exemestane alone.  - RTC in three months    RTC in 3 months. This note has been transcribed using a medical scribe and there is a possibility of unintentional typing misprints.    Reviewed ongoing medical problems and how they relate to her breast  cancer, will continue long term monitoring.       Diagnoses and all orders for this visit:  Hair loss  -     venlafaxine XR (Effexor XR) 75 mg 24 hr capsule; Take 1 capsule (75 mg) by mouth once daily. Do not crush or chew.  -     spironolactone (Aldactone) 50 mg tablet; Take 1 tablet (50 mg) by mouth once daily.  -     Testosterone; Future  Malignant neoplasm of overlapping sites of left breast in female, estrogen receptor positive (Multi)  -     Clinic Appointment Request  -     CBC and Auto Differential  -     Comprehensive metabolic panel  -     signatera; Other-indicate in comment; unknown - Miscellaneous Test  -     exemestane (Aromasin) 25 mg tablet; Take 1 tablet (25 mg total) by mouth once daily.  Take after a meal.  Try to take at the same time each day.  -     venlafaxine XR (Effexor XR) 75 mg 24 hr capsule; Take 1 capsule (75 mg) by mouth once daily. Do not crush or chew.  -     spironolactone (Aldactone) 50 mg tablet; Take 1 tablet (50 mg) by mouth once daily.  -     NM PET CT bone skull base to mid thigh; Future  -     Clinic Appointment Request; Future  -     Oncology Line Draw Appointment Request; Future  -     CBC and Auto Differential; Future  -     Comprehensive metabolic panel; Future  -     Cancer Antigen 27-29; Future  -     signatera; Other-indicate in comment; unknown - Miscellaneous Test; Future  Sindhu Kumar MD  Hematology/Oncology  [facility] [place] [facility]    Scribe Attestation  By signing my name below, Rebecca COLLINS Scribe attest that this documentation has been prepared under the direction and in the presence of Sindhu Kumar  MD.    Time Spent  Prep time on day of patient encounter: 5 minutes  Time spent directly with patient, family or caregiver: 22 minutes  Additional Time Spent on Patient Care Activities: 5 minutes  Documentation Time: 5 minutes  Other Time Spent: 0 minutes  Total: 37 minutes

## 2024-06-03 ENCOUNTER — OFFICE VISIT (OUTPATIENT)
Dept: HEMATOLOGY/ONCOLOGY | Facility: CLINIC | Age: 44
End: 2024-06-03
Payer: COMMERCIAL

## 2024-06-03 VITALS
SYSTOLIC BLOOD PRESSURE: 125 MMHG | BODY MASS INDEX: 30.08 KG/M2 | WEIGHT: 159.17 LBS | HEART RATE: 74 BPM | DIASTOLIC BLOOD PRESSURE: 73 MMHG | OXYGEN SATURATION: 96 % | TEMPERATURE: 97.5 F | RESPIRATION RATE: 16 BRPM

## 2024-06-03 DIAGNOSIS — C50.812 MALIGNANT NEOPLASM OF OVERLAPPING SITES OF LEFT BREAST IN FEMALE, ESTROGEN RECEPTOR POSITIVE (MULTI): ICD-10-CM

## 2024-06-03 DIAGNOSIS — Z17.0 MALIGNANT NEOPLASM OF OVERLAPPING SITES OF LEFT BREAST IN FEMALE, ESTROGEN RECEPTOR POSITIVE (MULTI): ICD-10-CM

## 2024-06-03 DIAGNOSIS — L65.9 HAIR LOSS: Primary | ICD-10-CM

## 2024-06-03 LAB
ALBUMIN SERPL BCP-MCNC: 4.4 G/DL (ref 3.4–5)
ALP SERPL-CCNC: 61 U/L (ref 33–110)
ALT SERPL W P-5'-P-CCNC: 81 U/L (ref 7–45)
ANION GAP SERPL CALC-SCNC: 12 MMOL/L (ref 10–20)
AST SERPL W P-5'-P-CCNC: 56 U/L (ref 9–39)
BASOPHILS # BLD AUTO: 0.04 X10*3/UL (ref 0–0.1)
BASOPHILS NFR BLD AUTO: 1.8 %
BILIRUB SERPL-MCNC: 0.3 MG/DL (ref 0–1.2)
BUN SERPL-MCNC: 20 MG/DL (ref 6–23)
CALCIUM SERPL-MCNC: 8.9 MG/DL (ref 8.6–10.3)
CHLORIDE SERPL-SCNC: 107 MMOL/L (ref 98–107)
CO2 SERPL-SCNC: 26 MMOL/L (ref 21–32)
CREAT SERPL-MCNC: 0.67 MG/DL (ref 0.5–1.05)
EGFRCR SERPLBLD CKD-EPI 2021: >90 ML/MIN/1.73M*2
EOSINOPHIL # BLD AUTO: 0.03 X10*3/UL (ref 0–0.7)
EOSINOPHIL NFR BLD AUTO: 1.4 %
ERYTHROCYTE [DISTWIDTH] IN BLOOD BY AUTOMATED COUNT: 13.5 % (ref 11.5–14.5)
GLUCOSE SERPL-MCNC: 102 MG/DL (ref 74–99)
HCT VFR BLD AUTO: 31.4 % (ref 36–46)
HGB BLD-MCNC: 10.8 G/DL (ref 12–16)
IMM GRANULOCYTES # BLD AUTO: 0.01 X10*3/UL (ref 0–0.7)
IMM GRANULOCYTES NFR BLD AUTO: 0.5 % (ref 0–0.9)
LYMPHOCYTES # BLD AUTO: 0.54 X10*3/UL (ref 1.2–4.8)
LYMPHOCYTES NFR BLD AUTO: 24.3 %
MCH RBC QN AUTO: 36.6 PG (ref 26–34)
MCHC RBC AUTO-ENTMCNC: 34.4 G/DL (ref 32–36)
MCV RBC AUTO: 106 FL (ref 80–100)
MONOCYTES # BLD AUTO: 0.53 X10*3/UL (ref 0.1–1)
MONOCYTES NFR BLD AUTO: 23.9 %
NEUTROPHILS # BLD AUTO: 1.07 X10*3/UL (ref 1.2–7.7)
NEUTROPHILS NFR BLD AUTO: 48.1 %
PLATELET # BLD AUTO: 155 X10*3/UL (ref 150–450)
POTASSIUM SERPL-SCNC: 4.2 MMOL/L (ref 3.5–5.3)
PROT SERPL-MCNC: 6.4 G/DL (ref 6.4–8.2)
RBC # BLD AUTO: 2.95 X10*6/UL (ref 4–5.2)
SODIUM SERPL-SCNC: 141 MMOL/L (ref 136–145)
TESTOST SERPL-MCNC: <30 NG/DL (ref 0–70)
WBC # BLD AUTO: 2.2 X10*3/UL (ref 4.4–11.3)

## 2024-06-03 PROCEDURE — 36415 COLL VENOUS BLD VENIPUNCTURE: CPT | Performed by: INTERNAL MEDICINE

## 2024-06-03 PROCEDURE — 99214 OFFICE O/P EST MOD 30 MIN: CPT | Performed by: INTERNAL MEDICINE

## 2024-06-03 PROCEDURE — 85025 COMPLETE CBC W/AUTO DIFF WBC: CPT | Performed by: INTERNAL MEDICINE

## 2024-06-03 PROCEDURE — G2211 COMPLEX E/M VISIT ADD ON: HCPCS | Performed by: INTERNAL MEDICINE

## 2024-06-03 PROCEDURE — 84075 ASSAY ALKALINE PHOSPHATASE: CPT | Performed by: INTERNAL MEDICINE

## 2024-06-03 PROCEDURE — 84403 ASSAY OF TOTAL TESTOSTERONE: CPT | Performed by: INTERNAL MEDICINE

## 2024-06-03 RX ORDER — SPIRONOLACTONE 50 MG/1
50 TABLET, FILM COATED ORAL DAILY
Qty: 90 TABLET | Refills: 4 | Status: SHIPPED | OUTPATIENT
Start: 2024-06-03 | End: 2025-06-03

## 2024-06-03 RX ORDER — EXEMESTANE 25 MG/1
25 TABLET ORAL DAILY
Qty: 90 TABLET | Refills: 3 | Status: SHIPPED | OUTPATIENT
Start: 2024-06-03

## 2024-06-03 RX ORDER — VENLAFAXINE HYDROCHLORIDE 75 MG/1
75 CAPSULE, EXTENDED RELEASE ORAL DAILY
Qty: 90 CAPSULE | Refills: 4 | Status: SHIPPED | OUTPATIENT
Start: 2024-06-03 | End: 2025-06-03

## 2024-06-03 ASSESSMENT — PAIN SCALES - GENERAL: PAINLEVEL: 0-NO PAIN

## 2024-06-07 ENCOUNTER — CLINICAL SUPPORT (OUTPATIENT)
Dept: SLEEP MEDICINE | Facility: HOSPITAL | Age: 44
End: 2024-06-07
Payer: COMMERCIAL

## 2024-06-07 DIAGNOSIS — R06.83 SNORING: ICD-10-CM

## 2024-06-07 DIAGNOSIS — R40.0 DAYTIME SOMNOLENCE: ICD-10-CM

## 2024-06-07 PROCEDURE — 95806 SLEEP STUDY UNATT&RESP EFFT: CPT | Performed by: PSYCHIATRY & NEUROLOGY

## 2024-06-11 ENCOUNTER — APPOINTMENT (OUTPATIENT)
Dept: HEMATOLOGY/ONCOLOGY | Facility: CLINIC | Age: 44
End: 2024-06-11
Payer: COMMERCIAL

## 2024-06-12 ENCOUNTER — APPOINTMENT (OUTPATIENT)
Dept: INTEGRATIVE MEDICINE | Facility: CLINIC | Age: 44
End: 2024-06-12
Payer: COMMERCIAL

## 2024-06-12 DIAGNOSIS — F41.9 ANXIETY: Primary | ICD-10-CM

## 2024-06-12 DIAGNOSIS — R23.2 HOT FLASHES: ICD-10-CM

## 2024-06-12 PROCEDURE — 97139 UNLISTED THERAPEUTIC PX: CPT | Performed by: ACUPUNCTURIST

## 2024-06-12 ASSESSMENT — PAIN SCALES - GENERAL: PAINLEVEL_OUTOF10: 3

## 2024-06-12 NOTE — PROGRESS NOTES
Acupuncture Visit:     Subjective   Patient ID: Shannon Recio is a 43 y.o. female who presents for No chief complaint on file.  Seeking continued support for hot flashes that are short in duration but frequent especially at work. She has to use fan to mitigate.  Seeking support for anxiety  Previous:  No major changes  Anxiety continues, stress  Sleep no change  Hot flashes same        Session Information  Is this acupuncture treatment being billed to the patient's insurance company: No  Visit Type: Follow-up visit    Pre-treatment Assessment  Pain Score: 2  Anxiety Level (0-10): 4  Stress Level (0-10): 4  Coping Level (0-10): 6  Depression Level (0-10): 2  Fatigue Level (0-10): 5  Nausea Level (0-10): 0  Wellbeing Level (0-10): 5    Review of Systems         Provider reviewed plan for the acupuncture session, precautions and contraindications. Patient/guardian/hospital staff has given consent to treat with full understanding of what to expect during the session. Before acupuncture began, provider explained to the patient to communicate at any time if the procedure was causing discomfort past their tolerance level. Patient agreed to advise acupuncturist. The acupuncturist counseled the patient on the risks of acupuncture treatment including pain, infection, bleeding, and no relief of pain. The patient was positioned comfortably. There was no evidence of infection at the site of needle insertions.    Objective   Physical Exam         Treatment Plan  Treatment Goals: Anxiety reduction (decrease hot flashes)    Acupuncture Treatment  Patient Position: Seated and reclining  Acupuncture Needling: Yes  Needle Guage: 42 guage /.14/ Lime green seirin, 40 guage /.16/ Red seirin  Body Points: With retention  Body Points - Left: darnell 5, lr 4, dlpfcx2  Body Points - Bilateral: sj 5, gb 41, k 7, immunex1  Body Points - Right: ht 7  Needle Count In: 13  Needle Count Out: 13  Needle Retention Time (min): 30 minutes  Total Face  to Face Time (min): 25 minutes              Assessment/Plan

## 2024-06-14 ENCOUNTER — TELEPHONE (OUTPATIENT)
Dept: HEMATOLOGY/ONCOLOGY | Facility: CLINIC | Age: 44
End: 2024-06-14
Payer: COMMERCIAL

## 2024-06-14 NOTE — TELEPHONE ENCOUNTER
I called and spoke with Shannon, notified her that her recent Signatera test resulted negative. Pt verbalized understanding and had no other questions at this time.

## 2024-06-24 ENCOUNTER — APPOINTMENT (OUTPATIENT)
Dept: INTEGRATIVE MEDICINE | Facility: CLINIC | Age: 44
End: 2024-06-24
Payer: COMMERCIAL

## 2024-06-24 DIAGNOSIS — F41.9 ANXIETY: ICD-10-CM

## 2024-06-24 DIAGNOSIS — Z17.0 MALIGNANT NEOPLASM OF OVERLAPPING SITES OF LEFT BREAST IN FEMALE, ESTROGEN RECEPTOR POSITIVE (MULTI): ICD-10-CM

## 2024-06-24 DIAGNOSIS — C50.812 MALIGNANT NEOPLASM OF OVERLAPPING SITES OF LEFT BREAST IN FEMALE, ESTROGEN RECEPTOR POSITIVE (MULTI): ICD-10-CM

## 2024-06-24 DIAGNOSIS — F41.9 ANXIETY AND DEPRESSION: Primary | ICD-10-CM

## 2024-06-24 DIAGNOSIS — R23.2 HOT FLASHES: ICD-10-CM

## 2024-06-24 DIAGNOSIS — F32.A ANXIETY AND DEPRESSION: Primary | ICD-10-CM

## 2024-06-24 PROCEDURE — 99214 OFFICE O/P EST MOD 30 MIN: CPT | Performed by: HOSPITALIST

## 2024-06-24 NOTE — PROGRESS NOTES
"Patient ID: Shannon Recio is a 43 y.o. female.  Referring Physician: Luis Kohli, APRN-CNP  [id number]  [place] [address]  Primary Care Provider: Lee Ann Sellers MD     CANCER HISTORY:   44 yo woman with h/o L breast IDC, stage IIB, pT2N1a, ER>95%, HER 2 neg, low risk mammaprint  Biopsy proven liver mets 8/22 - ER+  S/p bilat mastectomy 5/22, no adjuvant chemo initially  Current Treatment: Letrozole, ibrance, zometa, lupron  Following signatera  S/p reconstructive surgery  4/23 SBRT to liver (4 mets), regional LN's and chest wall     INTEGRATIVE HISTORY:  Symptoms:  Arthralgias - has had plantar fasciitis, flat feet  Takes 15 min before walking without pain  Mostly in the morning    Fatigue - ongoing, snoring more - saw someone in sleep clinic, at home sleep study, hoping that improved sleep with help her with energy     Anxiety - long history of anxiety - overall ok right now, taking buspar, propanolol for migraines     Hot flashes - 10/22 ovaries removed, started then.  Occurs several times a day, now on effexor  Now about once/night  Worse with anxiety, still some at work     Diet: working on it as well, has a sweet tooth  Eating breakfast more often - oats, yogurt  Inconsistent  Gained 10 lbs in last year - weights 150 lbs, 5 ft 1     PA: not very active, not exercising consistently, walking around the block  Growing up used to do a lot of sports, dancing    Sleep: stays up later than she should doing housework, on the phone etc.  Falls asleep easily and stays asleep ok too.  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  There were no vitals taken for this visit.     PHYSICAL EXAM:  NAD, awake/alert  HEENT, NCAT, OP clear, no oral lesions  CTA bilat  RRR no mgr  Abd soft/nt/nd+bs  No c/c/e/ttp  Motor/sensory intact, CN 2-12 intact      RESULTS:        Lab Results      Assessment/Plan   Cancer Staging   No matching staging information was found for the patient.     CANCER SPECIFIC RECCS:  44 yo woman with h/o L breast IDC, stage IIB, pT2N1a, ER>95%, HER 2 neg, low risk mammaprint  Biopsy proven liver mets 8/22 - ER+  S/p bilat mastectomy 5/22, no adjuvant chemo initially  Current Treatment: Letrozole, ibrance, zometa, lupron  Following signatera  S/p reconstructive surgery  4/23 SBRT to liver (4 mets), regional LN's and chest wall     CANCER SPECIFIC RECCS:  Breast cancer:  Whole Foods plant based diet - Dietitian consultation  5-9 fruits/veg/day, Cruciferous Vegetables - Brussel Sprouts, Kale, Broccoli, Cauliflower  American [place] [employer] \"New American Plate\"  Organic dairy preferred  Limit sugar - no sugar beverages, no sweets  Consider intermittent fasting 3-5 days per week (not eating for 13 hrs straight) - do not eat after 7 pm  Limit alcohol   Moderate soy is ok (edamame/tofu) once/day  Fiber including flax seeds beneficial     Exercise 30 min/day 5 days a week, vary by balance, cardio and resistance training  Would do it in the morning     Supplements to consider:  Turmeric (kat) - taking right now  [employer] STAMETS 7  Melatonin 1-3 mg at night 1 hr prior to sleep  Vitamin D3 4082-9785 IU/day  Omega 3 supplementation (nordic naturals)     Reading:  Anticancer Living  Cancer Fighting Kitchen     Websites:  Anticancer Lifestyle Program  Cancerchoices.org  Cook for your Life     Podcast: Integrative Oncology Talk     Apps: Oncio stacie ([email or web address].[email or web address])     Support: [employer] (exercise programs, dietitian, support groups, financial support and services)     SYMPTOM " MANAGEMENT:  Arthralgias: on exemestane now  Acupuncture in [facility] symptom management clinic  Arnica tablets or cream (Boiron)  Vitamin D3 7940-8961 IU/day  [place] 3  Deep Blue (DoTerra) or PROZE NERVE  Turmeric (Meghann Turmeric Supreme)  Yoga     Hot flashes - acupuncture - possibly helped  Continue effexor, buspar  Moderate soy     Anxiety - can discuss breathing and meditation later  Father hit head recently which caused stress   HEALTH Tracks  Massage - will start    Fatigue - American Ginseng 2 grams/day  Ashwaghanda (Meghann) - fatigue and anxiety  [employer] STAMETS 7  Acupuncture  Reiki - may help with fatigue - may consider  See about the sleep study, increase PA     Follow up: [facility] symptom management clinic as needed  [facility] clinic 3 months

## 2024-06-27 ENCOUNTER — APPOINTMENT (OUTPATIENT)
Dept: NEUROLOGY | Facility: CLINIC | Age: 44
End: 2024-06-27
Payer: COMMERCIAL

## 2024-06-27 VITALS
WEIGHT: 160.5 LBS | SYSTOLIC BLOOD PRESSURE: 122 MMHG | HEART RATE: 87 BPM | DIASTOLIC BLOOD PRESSURE: 86 MMHG | BODY MASS INDEX: 30.3 KG/M2 | HEIGHT: 61 IN | TEMPERATURE: 96.8 F

## 2024-06-27 DIAGNOSIS — G43.809 OTHER MIGRAINE WITHOUT STATUS MIGRAINOSUS, NOT INTRACTABLE: Primary | ICD-10-CM

## 2024-06-27 PROCEDURE — 99213 OFFICE O/P EST LOW 20 MIN: CPT | Performed by: PSYCHIATRY & NEUROLOGY

## 2024-06-27 PROCEDURE — 1036F TOBACCO NON-USER: CPT | Performed by: PSYCHIATRY & NEUROLOGY

## 2024-06-27 RX ORDER — SUMATRIPTAN 50 MG/1
50 TABLET, FILM COATED ORAL ONCE AS NEEDED
Qty: 9 TABLET | Refills: 3 | Status: SHIPPED | OUTPATIENT
Start: 2024-06-27 | End: 2024-06-27 | Stop reason: SDUPTHER

## 2024-06-27 RX ORDER — SUMATRIPTAN 50 MG/1
50 TABLET, FILM COATED ORAL ONCE AS NEEDED
Qty: 9 TABLET | Refills: 3 | Status: SHIPPED | OUTPATIENT
Start: 2024-06-27

## 2024-06-27 ASSESSMENT — LIFESTYLE VARIABLES
AUDIT-C TOTAL SCORE: 1
HOW MANY STANDARD DRINKS CONTAINING ALCOHOL DO YOU HAVE ON A TYPICAL DAY: 1 OR 2
HOW OFTEN DO YOU HAVE A DRINK CONTAINING ALCOHOL: MONTHLY OR LESS
SKIP TO QUESTIONS 9-10: 1
HOW OFTEN DO YOU HAVE SIX OR MORE DRINKS ON ONE OCCASION: NEVER

## 2024-06-27 ASSESSMENT — PATIENT HEALTH QUESTIONNAIRE - PHQ9
1. LITTLE INTEREST OR PLEASURE IN DOING THINGS: NOT AT ALL
2. FEELING DOWN, DEPRESSED OR HOPELESS: NOT AT ALL
SUM OF ALL RESPONSES TO PHQ9 QUESTIONS 1 & 2: 0

## 2024-06-27 NOTE — PROGRESS NOTES
Chief Complaint: Migraines    HPI  44 y.o. female presenting for follow up regarding migraines.    Since the last visit, she has been doing well.  Her migraine frequency is 1-2 times per month.  She is on Propranolol LA 60 mg/day.  Prior to Propranolol, her migraine frequency was 6-10 per month.  She is on Sumatriptan as rescue therapy.  She denies any side effects from Propranolol or Sumatriptan.          Current Outpatient Medications:     ascorbic acid/collagen hydr (COLLAGEN SKIN RENEWAL ORAL), Take 1 tablet by mouth once daily (M-F)., Disp: , Rfl:     B complex-vitamin C-folic acid (Nephro-Je Rx) 1- mg-mg-mcg tablet, Take 1 tablet by mouth once daily with breakfast., Disp: , Rfl:     busPIRone (Buspar) 15 mg tablet, Take 1 tablet (15 mg) by mouth 2 times a day., Disp: , Rfl:     C/sourcherry/celery/grape seed (TART CHERRY ORAL), Take 1 tablet by mouth once daily (M-F)., Disp: , Rfl:     calcium carbonate (CALCIUM 600 ORAL), Take 1 tablet by mouth once daily., Disp: , Rfl:     cholecalciferol (Vitamin D3) 50 mcg (2,000 unit) capsule, Take 1 capsule (50 mcg) by mouth once daily., Disp: , Rfl:     ciclopirox 1 % shampoo, 1 Application, Disp: , Rfl:     exemestane (Aromasin) 25 mg tablet, Take 1 tablet (25 mg total) by mouth once daily.  Take after a meal.  Try to take at the same time each day., Disp: 90 tablet, Rfl: 3    GENERIC EXTERNAL MEDICATION, Take 1 tablet by mouth once daily. Probiotic/prebiotic, Disp: , Rfl:     GENERIC EXTERNAL MEDICATION, Take 1 tablet by mouth once daily. Tumeric, Disp: , Rfl:     glucosamine-chondroitin 500-400 mg tablet, Take 1 tablet by mouth 3 times a day., Disp: , Rfl:     hydrOXYzine HCL (Atarax) 25 mg tablet, Take 1 tablet (25 mg) by mouth every 4 hours if needed for itching., Disp: , Rfl:     lactase (Lactaid) 3,000 unit tablet, Take 1 tablet (3,000 Units) by mouth 3 times daily (morning, midday, late afternoon)., Disp: , Rfl:     MULTIVIT-MIN-FERROUS FUMARATE ORAL,  "Take by mouth once daily., Disp: , Rfl:     naproxen sodium (Anaprox) 550 mg tablet, Take by mouth every 12 hours., Disp: , Rfl:     palbociclib (Ibrance) 125 mg tablet, Take 1 tablet (125 mg total) by mouth once daily.  Take on Days 1 - 21 of a 28 day treatment cycle., Disp: 21 tablet, Rfl: 5    propranolol LA (Inderal LA) 60 mg 24 hr capsule, Take 1 capsule (60 mg) by mouth once daily. Do not crush, chew, or split., Disp: , Rfl:     spironolactone (Aldactone) 50 mg tablet, Take 1 tablet (50 mg) by mouth once daily., Disp: 90 tablet, Rfl: 4    SUMAtriptan (Imitrex) 50 mg tablet, Take 1 tablet (50 mg) by mouth 1 time if needed for migraine. May repeat dose once in 2 hours if no relief.  Do not exceed 2 doses in 24 hours., Disp: , Rfl:     triamcinolone (Kenalog) 0.1 % cream, Apply 1 Application topically 2 times a day., Disp: , Rfl:     venlafaxine XR (Effexor XR) 75 mg 24 hr capsule, Take 1 capsule (75 mg) by mouth once daily. Do not crush or chew., Disp: 90 capsule, Rfl: 4      Objective:  /86 (BP Location: Right arm, Patient Position: Sitting, BP Cuff Size: Adult)   Pulse 87   Temp 36 °C (96.8 °F) (Temporal)   Ht 1.549 m (5' 1\")   Wt 72.8 kg (160 lb 8 oz)   BMI 30.33 kg/m²     Gen: NAD  Neuro:  --HIF: A&O X 3, repetition and naming intact  --CN:  PERRLA, EOMI, VFF, no visible facial asymmetry, facial sensation intact, no tongue or palatal deviation, SCM intact  --Motor: Moves all 4 extremities equally; no focal deficits  --Sensory: Intact to light touch, intact to pinprick  --Reflex: 2+ symmetric, toes down  --Cerebellum: FTN and HTS intact  --Gait: Normal, narrow based gait    Relevant Results      Assessment:    Migraine   - overall doing well; current migraine frequency is up to 1-2 times per month    Plan:  - recommend stopping Propranolol  - continue Sumatriptan as rescue therapy  - keep migraine diary  - follow up in 6 months  - if migraines increase in frequency, we will need to re-start " Propranolol LA 60 mg/day    Keenan Ford MD  [facility]  Department of Neurology

## 2024-07-01 ENCOUNTER — PATIENT MESSAGE (OUTPATIENT)
Dept: HEMATOLOGY/ONCOLOGY | Facility: CLINIC | Age: 44
End: 2024-07-01
Payer: COMMERCIAL

## 2024-07-01 NOTE — TELEPHONE ENCOUNTER
----- Message from Shannon Recio sent at 7/1/2024  4:38 PM EDT -----  Regarding: Skin abscess   Contact: [phone]  Hello, I went to  Urgent Care in [place] on Friday afternoon and Dr. Stanley asked that I let my oncologist know about my visit. I had noticed some pain and swelling on my very upper left thigh in the groin area a few days before that, but it got larger and painful by Friday. Dr. Stanley said it was most likely an infected hair follicle that became abscessed. He prescribed Doxycycline 100mg twice daily for 10 days. I believe the abscess ruptured on Saturday while I was at work leaving a weeping hole with little to no blood coming from it. I started applying mepilex pads that I had leftover from radiation to the area which has made it more comfortable to walk. Most of the swelling has gone down, it is becoming less painful, and I have not had a fever yet. I think I should probably hold off until next week to restart my Ibrance cycle since I was due to start today, but if you and Dr. Kumar disagree or if you feel that I should come in to be re-evaluated, please contact me within the   next few days. I will, of course, follow up as well if it has not healed or has stopped getting better by the time my antibiotic therapy is complete.   Thank you for your time and sorry for the gross descriptions!     Abby Recio

## 2024-07-01 NOTE — PROGRESS NOTES
"I called and spoke with patient regarding MyChart message about skin abscess. Pt states she started antibiotic 7/28 and site has improved. Drainage is described as serous, clear - not pus. Dr. Kumar viewed message and recommended to \"hold Ibrance for one week, restart next week. If site has more swelling, pain, redness or increased drainage go to PCP or urgent care to be assessed. Call in with update after 10 days of antibiotics if not improved.\" Pt verbalized understanding and had no further questions.   "

## 2024-07-03 DIAGNOSIS — Z17.0 MALIGNANT NEOPLASM OF OVERLAPPING SITES OF LEFT BREAST IN FEMALE, ESTROGEN RECEPTOR POSITIVE (MULTI): Primary | ICD-10-CM

## 2024-07-03 DIAGNOSIS — C50.812 MALIGNANT NEOPLASM OF OVERLAPPING SITES OF LEFT BREAST IN FEMALE, ESTROGEN RECEPTOR POSITIVE (MULTI): Primary | ICD-10-CM

## 2024-07-03 NOTE — TELEPHONE ENCOUNTER
Shannon Recio is a 44 y.o. year old female patient who had a patient care encounter with Dr. Kumar on 6/3/24 for ongoing management of Malignant neoplasm of overlapping sites of left breast in female, estrogen receptor positive (Multi) [C50.812, Z17.0] .  Abby is currently being     WBC 2.2, ANC 1.07, H/H 10.8/31.4, PLTs 155;  SCr 0.67, ALT 81, AST 56;  other labs pending.  Per PA, MADHURI is tolerating palbo, concerrned about hair loss (recently started minoxidil).  Planning repet PET imaging in 3 mo., may consider stopping CDK 4/6 inhibitor & just continuing exemestane monotherapy at that time.    Abby saw a provider on approx. 6/28/24 regarding pain in her upper L thigh/groin:  ingrown hair with possible abscess.  Started doxycycline 100 mg PO twice daily x 10 days.  Per Dr. Kumar 7/1/24:  hold next cycle of palbociclib for ~ 1 week (~ 7/8/24) then OK to begin if infection cleared.  Abby stated understanding.    Per collaborative practice agreement with Dr. Kumar, on 7/3/24 I refilled palbociclib 125 mg PO once daily on D1 - 21 of a 28-day cycle.  Qty # 21 with 5 refills.  The prescription was sent to [facility].    Next FUV is 9/5/24.      Jose Ocampo, [facility], MS, BCOP  Clinical Pharmacist Specialist - Ambulatory Oncology

## 2024-07-09 ENCOUNTER — TELEPHONE (OUTPATIENT)
Dept: HEMATOLOGY/ONCOLOGY | Facility: CLINIC | Age: 44
End: 2024-07-09
Payer: COMMERCIAL

## 2024-07-09 NOTE — TELEPHONE ENCOUNTER
Second attempt to call Abby. Call back information was left and I will re attempt to call her tomorrow.

## 2024-07-09 NOTE — TELEPHONE ENCOUNTER
I left a detailed message on the patient's identifiable VM notifying her that Dr. Kumar was updated about the patients current symptoms/concerns and that it is recommended that she come in to be seen by Luis THAO (either Wednesday 7/10/24 or Thursday 7/11/24). She was also notified that when she does call the clinic I will discuss her questions/concerns about her ibrance. Call back information was left and we will await her call back.

## 2024-07-09 NOTE — TELEPHONE ENCOUNTER
----- Message from Shannon Recio sent at 7/8/2024 11:37 AM EDT -----  Regarding: Skin abscess   Contact: [phone]  Hello again,   My skin has healed and I am planning on restarting the Ibrance tonight, but it still feels swollen under the skin, so I’m not sure if it is a swollen gland or if there is still a pocket of infection under the skin. I finished my last dose of doxycycline last night. Should I make an appointment to be seen or see if the swelling goes down more on its own?   Thank you,   Abby Recio

## 2024-07-10 ENCOUNTER — APPOINTMENT (OUTPATIENT)
Dept: INTEGRATIVE MEDICINE | Facility: CLINIC | Age: 44
End: 2024-07-10

## 2024-07-10 DIAGNOSIS — F41.9 ANXIETY: ICD-10-CM

## 2024-07-10 DIAGNOSIS — R23.2 HOT FLASHES: Primary | ICD-10-CM

## 2024-07-10 PROCEDURE — 97139 UNLISTED THERAPEUTIC PX: CPT | Performed by: ACUPUNCTURIST

## 2024-07-10 ASSESSMENT — PAIN SCALES - GENERAL: PAINLEVEL_OUTOF10: 3

## 2024-07-10 NOTE — TELEPHONE ENCOUNTER
I spoke with Abby, she states that the swelling in her groin is still present with pink coloration, but that the swelling has slightly gone down. She is denying pain, fevers or drainage from the site. She states that she did finished a 10 day course of doxycycline 100 mg BID this past Sunday night. I did notify her that Dr. Kumar is aware of her current symptoms and that based on these symptoms it is recommended that abby come in to be seen by Luis THAO. Abby was agreeable to see Luis 7/15/24 Monday at 830 am. She also stated that she has started her ibrance and I did notify her that Dr. Kumar is okay with her starting/continuing her medication. She is also aware that if she develops any other symptoms or if she has any questions/concerns before her appt on Monday to please call the clinic. She was appreciative of the call, gave verbal understanding of all information and had no other questions at this time.

## 2024-07-10 NOTE — PROGRESS NOTES
Acupuncture Visit:     Subjective   Patient ID: Shannon Recio is a 44 y.o. female who presents for No chief complaint on file.  Seeking continued support for anxiety and hot flashes.  The latter are occurring with less frequency at night.  Because of the increase of heat outside she noted difficulty between feeling hot, hot flash or heat intolerance triggering hot flash.  Anxiety has been more manageable.        Session Information  Is this acupuncture treatment being billed to the patient's insurance company: No  Visit Type: Follow-up visit    Pre-treatment Assessment  Pain Score: 2  Anxiety Level (0-10): 3  Stress Level (0-10): 3  Coping Level (0-10): 6  Depression Level (0-10): 0  Fatigue Level (0-10): 4  Nausea Level (0-10): 0  Wellbeing Level (0-10): 6    Review of Systems         Provider reviewed plan for the acupuncture session, precautions and contraindications. Patient/guardian/hospital staff has given consent to treat with full understanding of what to expect during the session. Before acupuncture began, provider explained to the patient to communicate at any time if the procedure was causing discomfort past their tolerance level. Patient agreed to advise acupuncturist. The acupuncturist counseled the patient on the risks of acupuncture treatment including pain, infection, bleeding, and no relief of pain. The patient was positioned comfortably. There was no evidence of infection at the site of needle insertions.    Objective   Physical Exam    Acupuncture Physical Exam  Tongue Color: Pale body  Tongue Shape: Puffy  Tongue Coating: Thin white  Pulse: Weak    Treatment Plan  Treatment Goals: Anxiety reduction (reduce intensity duration and frequency of hot flash)    Acupuncture Treatment  Patient Position: Seated and reclining  Needle Guage: 40 guage /.16/ Red seirin, 42 guage /.14/ Lime green seirin  Body Points - Left: dlpfcx2  Body Points - Bilateral: sp4, pc6, gb 41, sj5, st 36, k6, yin hernandez, ub  2bee  Needle Count In: 19  Needle Count Out: 19  Needle Retention Time (min): 25 minutes  Total Face to Face Time (min): 25 minutes         Post-treatment Assessment  0-10 (Numeric) Pain Score: 3  Anxiety Level (0-10): 2  Stress Level (0-10): 2  Coping Level (0-10): 7  Depression Level (0-10): 0  Fatigue Level (0-10): 3  Nausea Level (0-10): 0  Wellbeing Level (0-10): 7    Assessment/Plan

## 2024-07-11 LAB — SCAN RESULT: NORMAL

## 2024-07-15 ENCOUNTER — OFFICE VISIT (OUTPATIENT)
Dept: HEMATOLOGY/ONCOLOGY | Facility: CLINIC | Age: 44
End: 2024-07-15
Payer: COMMERCIAL

## 2024-07-15 VITALS
OXYGEN SATURATION: 95 % | SYSTOLIC BLOOD PRESSURE: 117 MMHG | HEART RATE: 81 BPM | BODY MASS INDEX: 30.03 KG/M2 | TEMPERATURE: 97 F | RESPIRATION RATE: 16 BRPM | DIASTOLIC BLOOD PRESSURE: 74 MMHG | WEIGHT: 158.95 LBS

## 2024-07-15 DIAGNOSIS — C50.812 MALIGNANT NEOPLASM OF OVERLAPPING SITES OF LEFT BREAST IN FEMALE, ESTROGEN RECEPTOR POSITIVE (MULTI): Primary | ICD-10-CM

## 2024-07-15 DIAGNOSIS — Z17.0 MALIGNANT NEOPLASM OF OVERLAPPING SITES OF LEFT BREAST IN FEMALE, ESTROGEN RECEPTOR POSITIVE (MULTI): Primary | ICD-10-CM

## 2024-07-15 PROCEDURE — 99214 OFFICE O/P EST MOD 30 MIN: CPT

## 2024-07-15 RX ORDER — PROPRANOLOL HYDROCHLORIDE 60 MG/1
60 CAPSULE, EXTENDED RELEASE ORAL DAILY
Qty: 90 CAPSULE | Refills: 0 | OUTPATIENT
Start: 2024-07-15

## 2024-07-15 RX ORDER — BERBERINE CHLOR/SEAWEED/CHROM 500-250 MG
CAPSULE ORAL
COMMUNITY

## 2024-07-15 ASSESSMENT — PAIN SCALES - GENERAL: PAINLEVEL: 0-NO PAIN

## 2024-07-15 NOTE — PROGRESS NOTES
Patient ID: Shannon Recio is a 44 y.o. female.  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 Shannon Recio is a 44 y/o F who presents for follow-up for left breast cancer. She is on Ibrance. Blood work from today is pending.     Patient is doing well today. She denies new issues with Ibrance with energy, rashes, or unexplained bruises, although, she reports pain, fatigue, and thinning hair. She is using Durmitor and states her hair is curly when it used to be straight. Notes itchiness and takes zyrtec. Also, she has been taking supplements. She has been seeing Dr. Collazo, integrative specialist. Reports abnormal facial hair growth. She has a family history of PCOS. She has heel pain from plantar fascitis, seeing a physiotherapist for this.    7/15/2024:   Patient presents for problem focused visit. She recently experienced a bump in her left groin area that she had evaluated at urgent care on about 6/28 who prescribed Doxycycline 100 mg BID x10 days for concern of infection. The site opened with some drainage. Dr. Kumar had advised she hold her Ibrance for one week. She finished the Doxycycline on about 7/7/24. Due to still having a lump beneath the skin she was advised to come in for evaluation. She says the tenderness, redness and swelling have resolved. She denies fevers, chills, weakness, appetite changes, drenching night sweats, SOB, chest pain, GI issues, incontinence, numbness, tingling or other lumps/bumps. She re-started her Ibrance on 7/8/24.      Patient's past medical history, surgical history, family history and social history reviewed.    Review of Systems:   Review of Systems:     Positive per HPI, otherwise negative.     Objective      Visit Vitals  /74   Pulse 81   Temp 36.1 °C (97 °F)   Resp 16   Wt 72.1 kg (158 lb 15.2 oz)   SpO2 95%   BMI 30.03 kg/m²   Smoking Status Never   BSA 1.76 m²          Physical Exam  Constitutional:       General: She is not in acute distress.     Appearance: Normal appearance.   HENT:      Head: Normocephalic.      Mouth/Throat:      Mouth: Mucous membranes are moist.      Pharynx: No oropharyngeal exudate or posterior oropharyngeal erythema.   Eyes:      General: No scleral icterus.     Pupils: Pupils are equal, round, and reactive to light.   Cardiovascular:      Rate and Rhythm: Normal rate and regular rhythm.   Pulmonary:      Effort: Pulmonary effort is normal. No respiratory distress.      Breath sounds: Normal breath sounds. No wheezing.   Abdominal:      General: Abdomen is flat. Bowel sounds are normal. There is no distension.      Palpations: Abdomen is soft.      Tenderness: There is no abdominal tenderness.   Musculoskeletal:         General: Normal range of motion.      Cervical back: Normal range of motion and neck supple.   Skin:     General: Skin is warm and dry.      Comments: Area of healing skin in left upper inner thigh/groin without erythema, edema, pain or drainage. There is a small, pencil-eraser sized are of firmness under the site likely related to infected site   Neurological:      General: No focal deficit present.      Mental Status: She is alert and oriented to person, place, and time. Mental status is at baseline.      Motor: No weakness.      Gait: Gait normal.   Psychiatric:         Mood and Affect: Mood normal.         Behavior: Behavior normal.         Judgment: Judgment normal.         Performance Status:  Symptomatic; fully ambulatory    Labs/Imaging/Pathology: personally reviewed reports and images in Epic electronic medical record system. Pertinent results as it related to the plan represented in below in  assessment and plan.     Assessment/Plan   1. Left breast IDC, stage IIB [id number], ER>95% TN>95% , HER2 neg, low risk mammaprint  2. Biopsy proven metastases to liver 8/1/22, hormone positive     - initially palpated a left breast mass with nipple changes in 3/21/2021 and she was referred to Dr. Waterman - diagnostic mammogram with tomosynthesis on 3/30/2022 and there was a mass highly suspicious of malignancy in the left breast. No findings in  the right breast. She underwent subsequently a ultrasound on the same day and she was found to have an irregular spiculated hypoechoic shadowing mass measuring 1.9 x 1.6 x 2.9 cm at 1 o'clock, 3 cm from the nipple. There were 8 lymph nodes noted in the  left axilla with 2-3 lymph nodes of intermediate suspicion of involvement  - core needle biopsy on 4/11/2022 and final pathology shows invasive ductal carcinoma, grade 2, in DCIS in the breast mass. The most suspicious lymph node was also biopsied and negative for carcinoma  - 5/13/22 bilateral mastectomy. Left breast showed invasive ductal carcinoma, grade 3 with DCIS. Final tumor size was 3.4 cm. Grade 3 with no extensive intraductal component. There was indeterminant left LVI. DCIS was present. All margins were negative.  There was also macrometastatic carcinoma in 2/4 lymph nodes. End stage N1a. Given stage 2 disease, she did not have staging scans completed  - initially discussed with her low risk mammaprint despite higher clinical risk with node involvement, ultimately decided to not pursue adjuvant chemotherapy   - during CT sim for RT planning, found to have concerning breast lesion  - Liver biopsy 8/1/22 consistent with primary breast cancer  - today discussed new diagnosis of metastatic disease and overall approach to therapy and prognosis  - Reviewed first line therapy with CDK 4/6 inhibitor plus AI inhibitor  - Regarding choice of CDK4/6 inhibitor, given liver involvement I discussed with patient that although  there is some more longer survival data with ribociclib, I would be concerned about risk of hepatotoxicity as if there is no other distant disease we  could consider treatment of oligometastatic disease  - 8/2022- started Ibrance 125 mg PO daily D1-21 every 28 days with letrozole 2.5 mg PO daily  - bilateral salpingo-oophorectomy on 10/10/22  - Signatera from 11/7 0.07 MTM/mL  - complete 50 Gy in 25 fractions to her regional nodes and chest wall along with SBRT completed on 5/22/2023. She also underwent SBRT to the liver metastases from 4/4 to 4/11/2023, for a total of 54 Gy in 3 fractions.   - PET CT from 1/23/23, which shows there is no clear evidence of disease.   -  scans from 7/20/23. No new evidence of recurrence.   - imaging from 12/11/23 shows no new evidence of recurrence in the bone, chest, abdomen or pelvis.   - 2/12/24:  - Patient overall doing well on Ibrance, labs unremarkable. No contraindication to proceed with Zometa today.  - She does have some new right rib and chest pain for the last month. Will plan to evaluate further with CT of the chest.  - Will plan for a telephone visit for followup on the day after CT chest 2/27/24.     3/11/24: Telephone call  - Overall, CT chest from 2/26/24 is unremarkable.   - Blood work from 2/12/24 also unremarkable.   - At this point, we will make no changes and plan for repeat blood work in 3 months.  - RTC in 3 months.     6/3/24:   - No remarkable findings on exam today.  - Labs today are pending.  - Overall tolerating Ibrance  - She has started these supplements and met with Dr. Collazo.  - Refills provided for Ibrance and exemestane.  - Today, she is worried about her hair loss. Recently started topical minoxidil.  - We'll check testosterone level as she is does have a family history of PCOS.  - I will also add spironolactone 50 mg daily, which we can increase to 100 mg if she tolerates it.  - Plan for PET scan in three months. It'll be two years at that point of  being on a CDK 46 inhibitor.  - We could consider stopping the Ibrance and continuing exemestane alone.  - RTC in three months    7/15/2024:   - Patient presents for problem focused visit   - She had an infected are of her left upper thigh/groin area, possible infected hair follicle or ingrown hair   - s/p doxycycline 100 mg BID x10 days with improvement of symptoms after site opened and drained during ABX course   - Now with healing skin that is closed and no signs of infection, there is still a small bump under her skin at the site that was not there prior to infection per patient, therefore, we will have her continue to monitor the site while in the shower so that site stays clean. She was advised to call us if she notices the area enlarging, developing infectious signs/symptoms, or does not go away. Or if new areas of lumps/bumps arise  - There is no inguinal lymphadenopathy on exam today and no edema noted in groin or leg area   - Educated the patient that likely the body will absorb the area of post-infection as it continues to heal and bump will go away   - Since she is otherwise feeling normal and well we do not need lab work today and no further testing at this time   - Above information and findings reviewed with Dr. Kumar as well   - Patient will return as scheduled in September for next visit after a PET scan unless she has new/worsening/persisting symptoms prior       TAB Pardo-CNP

## 2024-07-16 LAB — SCAN RESULT: NORMAL

## 2024-07-17 ENCOUNTER — TELEPHONE (OUTPATIENT)
Dept: HEMATOLOGY/ONCOLOGY | Facility: CLINIC | Age: 44
End: 2024-07-17
Payer: COMMERCIAL

## 2024-07-17 NOTE — TELEPHONE ENCOUNTER
I spoke with Shannon and notified her that Luis THAO reviewed with Dr. Kumar, Shannon's symptoms and the swelling in the groin area, and that per Dr. Kumar/Luis Shannon is to monitor the site at this time (for any signs of infection or if the site gets bigger/larger in size). She was also made aware that Luis and Dr. Kumar think that the swelling in the groin should go away and that the site should heal. She is aware that if the site in the groin does not go away after one month then she is to contact her PCP for further evaluation/assessment. Shannon gave verbal understanding, stating that the spot in the groin has not changed since this past Monday (7/15/24) and that she does have a scheduled visit with her PCP in august. She was appreciative of the call, gave verbal understanding of all information and had no other questions at this time.

## 2024-07-26 ENCOUNTER — TELEPHONE (OUTPATIENT)
Dept: HEMATOLOGY/ONCOLOGY | Facility: CLINIC | Age: 44
End: 2024-07-26
Payer: COMMERCIAL

## 2024-07-26 NOTE — TELEPHONE ENCOUNTER
Pt has zometa appointment on 7/29/24 and has not had labs drawn within appropriate time frame. Call to pt to ask if she could come today or early in the day on Monday to get her labs drawn, it takes a while for comp to be resulted and her appointment is late in the day. She stated that she was working today and would be unable to come today but she could come early on Monday to get labs done prior to her 1515 appointment for Zometa.  This RN thanked pt for agreeing to get labs ahead of time. Pt was agreeable with plan and denied further questions.

## 2024-07-29 ENCOUNTER — LAB (OUTPATIENT)
Dept: LAB | Facility: CLINIC | Age: 44
End: 2024-07-29
Payer: COMMERCIAL

## 2024-07-29 ENCOUNTER — INFUSION (OUTPATIENT)
Dept: HEMATOLOGY/ONCOLOGY | Facility: CLINIC | Age: 44
End: 2024-07-29
Payer: COMMERCIAL

## 2024-07-29 VITALS
RESPIRATION RATE: 18 BRPM | HEART RATE: 97 BPM | OXYGEN SATURATION: 95 % | WEIGHT: 160.94 LBS | BODY MASS INDEX: 30.41 KG/M2 | SYSTOLIC BLOOD PRESSURE: 116 MMHG | DIASTOLIC BLOOD PRESSURE: 77 MMHG | TEMPERATURE: 96.6 F

## 2024-07-29 DIAGNOSIS — Z17.0 MALIGNANT NEOPLASM OF OVERLAPPING SITES OF LEFT BREAST IN FEMALE, ESTROGEN RECEPTOR POSITIVE (MULTI): ICD-10-CM

## 2024-07-29 DIAGNOSIS — C50.812 MALIGNANT NEOPLASM OF OVERLAPPING SITES OF LEFT BREAST IN FEMALE, ESTROGEN RECEPTOR POSITIVE (MULTI): ICD-10-CM

## 2024-07-29 LAB
ALBUMIN SERPL BCP-MCNC: 4.6 G/DL (ref 3.4–5)
ALP SERPL-CCNC: 69 U/L (ref 33–110)
ALT SERPL W P-5'-P-CCNC: 65 U/L (ref 7–45)
ANION GAP SERPL CALC-SCNC: 13 MMOL/L (ref 10–20)
AST SERPL W P-5'-P-CCNC: 33 U/L (ref 9–39)
BILIRUB SERPL-MCNC: 0.4 MG/DL (ref 0–1.2)
BUN SERPL-MCNC: 19 MG/DL (ref 6–23)
CALCIUM SERPL-MCNC: 9.4 MG/DL (ref 8.6–10.3)
CHLORIDE SERPL-SCNC: 102 MMOL/L (ref 98–107)
CO2 SERPL-SCNC: 29 MMOL/L (ref 21–32)
CREAT SERPL-MCNC: 0.94 MG/DL (ref 0.5–1.05)
EGFRCR SERPLBLD CKD-EPI 2021: 78 ML/MIN/1.73M*2
GLUCOSE SERPL-MCNC: 148 MG/DL (ref 74–99)
HOLD SPECIMEN: NORMAL
MAGNESIUM SERPL-MCNC: 1.9 MG/DL (ref 1.6–2.4)
PHOSPHATE SERPL-MCNC: 4 MG/DL (ref 2.5–4.9)
POTASSIUM SERPL-SCNC: 3.9 MMOL/L (ref 3.5–5.3)
PROT SERPL-MCNC: 7.2 G/DL (ref 6.4–8.2)
SODIUM SERPL-SCNC: 140 MMOL/L (ref 136–145)

## 2024-07-29 PROCEDURE — 84100 ASSAY OF PHOSPHORUS: CPT

## 2024-07-29 PROCEDURE — 96365 THER/PROPH/DIAG IV INF INIT: CPT | Mod: INF

## 2024-07-29 PROCEDURE — 2500000004 HC RX 250 GENERAL PHARMACY W/ HCPCS (ALT 636 FOR OP/ED): Performed by: INTERNAL MEDICINE

## 2024-07-29 PROCEDURE — 84132 ASSAY OF SERUM POTASSIUM: CPT

## 2024-07-29 PROCEDURE — 83735 ASSAY OF MAGNESIUM: CPT

## 2024-07-29 PROCEDURE — 36415 COLL VENOUS BLD VENIPUNCTURE: CPT

## 2024-07-29 RX ORDER — EPINEPHRINE 0.3 MG/.3ML
0.3 INJECTION SUBCUTANEOUS EVERY 5 MIN PRN
OUTPATIENT
Start: 2025-01-13

## 2024-07-29 RX ORDER — DIPHENHYDRAMINE HYDROCHLORIDE 50 MG/ML
50 INJECTION INTRAMUSCULAR; INTRAVENOUS AS NEEDED
OUTPATIENT
Start: 2025-01-13

## 2024-07-29 RX ORDER — HEPARIN 100 UNIT/ML
500 SYRINGE INTRAVENOUS AS NEEDED
OUTPATIENT
Start: 2024-07-29

## 2024-07-29 RX ORDER — FAMOTIDINE 10 MG/ML
20 INJECTION INTRAVENOUS ONCE AS NEEDED
OUTPATIENT
Start: 2025-01-13

## 2024-07-29 RX ORDER — ALBUTEROL SULFATE 0.83 MG/ML
3 SOLUTION RESPIRATORY (INHALATION) AS NEEDED
OUTPATIENT
Start: 2025-01-13

## 2024-07-29 RX ORDER — HEPARIN SODIUM,PORCINE/PF 10 UNIT/ML
50 SYRINGE (ML) INTRAVENOUS AS NEEDED
OUTPATIENT
Start: 2024-07-29

## 2024-07-29 ASSESSMENT — PAIN SCALES - GENERAL: PAINLEVEL: 0-NO PAIN

## 2024-08-05 ENCOUNTER — DOCUMENTATION (OUTPATIENT)
Dept: OTHER | Age: 44
End: 2024-08-05
Payer: COMMERCIAL

## 2024-08-05 PROBLEM — Z00.00 HEALTHCARE MAINTENANCE: Status: ACTIVE | Noted: 2024-08-05

## 2024-08-05 PROBLEM — F41.9 ANXIETY: Status: ACTIVE | Noted: 2024-08-05

## 2024-08-07 ENCOUNTER — APPOINTMENT (OUTPATIENT)
Dept: INTEGRATIVE MEDICINE | Facility: CLINIC | Age: 44
End: 2024-08-07

## 2024-08-07 DIAGNOSIS — F41.9 ANXIETY: Primary | ICD-10-CM

## 2024-08-07 DIAGNOSIS — R23.2 HOT FLASHES: ICD-10-CM

## 2024-08-07 PROCEDURE — 97139 UNLISTED THERAPEUTIC PX: CPT | Performed by: ACUPUNCTURIST

## 2024-08-07 ASSESSMENT — PAIN SCALES - GENERAL: PAINLEVEL_OUTOF10: 2

## 2024-08-07 NOTE — PROGRESS NOTES
Acupuncture Visit:     Subjective   Patient ID: Shannon Recio is a 44 y.o. female who presents for No chief complaint on file.  PT reported that she has noticed reduction in hot flashes.  She has days were she does not notice them and others where she has around 5 episodes.  Triggers continue to be anxiety and external heat  Previous:  Seeking continued support for anxiety and hot flashes.  The latter are occurring with less frequency at night.  Because of the increase of heat outside she noted difficulty between feeling hot, hot flash or heat intolerance triggering hot flash.  Anxiety has been more manageable.                  Pre-treatment Assessment  Pain Score: 3  Anxiety Level (0-10): 4  Stress Level (0-10): 5  Coping Level (0-10): 7  Depression Level (0-10): 1  Fatigue Level (0-10): 5  Nausea Level (0-10): 0  Wellbeing Level (0-10): 6    Review of Systems         Provider reviewed plan for the acupuncture session, precautions and contraindications. Patient/guardian/hospital staff has given consent to treat with full understanding of what to expect during the session. Before acupuncture began, provider explained to the patient to communicate at any time if the procedure was causing discomfort past their tolerance level. Patient agreed to advise acupuncturist. The acupuncturist counseled the patient on the risks of acupuncture treatment including pain, infection, bleeding, and no relief of pain. The patient was positioned comfortably. There was no evidence of infection at the site of needle insertions.    Objective   Physical Exam    Acupuncture Physical Exam  Tongue Color: Pale body  Tongue Shape: Scalloped edges, Puffy    Treatment Plan  Treatment Goals: Anxiety reduction (reduce intensity duration and frequency of hot flashes)    Acupuncture Treatment  Patient Position: Seated and reclining  Body Points - Left: sj 5, dlpfcx2,  Body Points - Bilateral: yin hernandez, sp3.2, 6  Body Points - Right: gb 41,  buddha triangle  Needle Count In: 12  Needle Count Out: 12  Needle Retention Time (min): 30 minutes  Total Face to Face Time (min): 25 minutes         Post-treatment Assessment  0-10 (Numeric) Pain Score: 2  Anxiety Level (0-10): 2  Stress Level (0-10): 3  Coping Level (0-10): 6  Depression Level (0-10): 1  Fatigue Level (0-10): 4  Nausea Level (0-10): 0  Wellbeing Level (0-10): 6    Assessment/Plan

## 2024-08-09 NOTE — PROGRESS NOTES
Music Therapy Note      Therapy Session  Referral Type: New referral this admission  Visit Type: New visit  Session Start Time: 1324  Session End Time: 1345  Intervention Delivery: Virtual visit  Conflict of Service: None  Number of family members present: 1  Family Present for Session: Child     Pre-assessment  Mood/Affect: Calm, Participative  Verbalized Emotional State: Anxiety (overwhelmed)         Treatment/Interventions  Areas of Focus: Self-expression, Anxiety reduction, Stress reduction, Physiological functioning improvement, Mood modification  Music Therapy Interventions: Assessment, Songwriting/composition, Empathic listening/validating emotions  Interruption: No  Patient Fell Asleep at End of Session: No    Post-assessment  Mood/Affect: Calm, Cooperative  Verbalized Emotional State: Gratitude, Acceptance  Continue Visiting: Yes  Total Session Time (min): 21 minutes    Narrative  Assessment Detail: Pt returned MT's call about creating a HEALTH track to help address her current symptoms. Pt preferred phone call over Zoom and was prepared to talk at time of call.  Plan: Introduce services and assess goals/ preferences in music therapy.  Intervention: Gather information in order to create personalized meditation/ relaxation track for pt.  Evaluation: Pt shared some details of her cancer journey- states she is JOSEPH currently, but struggles with a lot of physical symptoms from various treatments which make mornings especially hard. She also shared the emotional burden of learning many of her loved ones have recently been diagnosed with cancer, and her father is struggling in the later stages of dementia as well. MT suggested we create a track that could help her start her day in a peaceful and positive way. Pt said reading on a beach, sounds of waves and crickets at night, summer camp vibes are the most relaxing to her. Pt very pleasant, not too particular- would like guitar and a beat. Appreciative of  service.  Follow-up: MT will get final product to pt in a few weeks.    Education Documentation  No documentation found.

## 2024-08-12 ENCOUNTER — LAB (OUTPATIENT)
Dept: LAB | Facility: LAB | Age: 44
End: 2024-08-12
Payer: COMMERCIAL

## 2024-08-12 ENCOUNTER — APPOINTMENT (OUTPATIENT)
Dept: PRIMARY CARE | Facility: CLINIC | Age: 44
End: 2024-08-12
Payer: COMMERCIAL

## 2024-08-12 VITALS
SYSTOLIC BLOOD PRESSURE: 122 MMHG | BODY MASS INDEX: 30.4 KG/M2 | TEMPERATURE: 97.2 F | HEIGHT: 61 IN | WEIGHT: 161 LBS | DIASTOLIC BLOOD PRESSURE: 83 MMHG

## 2024-08-12 DIAGNOSIS — F32.A ANXIETY AND DEPRESSION: ICD-10-CM

## 2024-08-12 DIAGNOSIS — R53.83 FATIGUE, UNSPECIFIED TYPE: ICD-10-CM

## 2024-08-12 DIAGNOSIS — F41.9 ANXIETY AND DEPRESSION: ICD-10-CM

## 2024-08-12 DIAGNOSIS — M72.2 BILATERAL PLANTAR FASCIITIS: ICD-10-CM

## 2024-08-12 DIAGNOSIS — R73.9 HYPERGLYCEMIA: ICD-10-CM

## 2024-08-12 DIAGNOSIS — E66.09 CLASS 1 OBESITY DUE TO EXCESS CALORIES WITH SERIOUS COMORBIDITY AND BODY MASS INDEX (BMI) OF 30.0 TO 30.9 IN ADULT: ICD-10-CM

## 2024-08-12 DIAGNOSIS — Z00.00 HEALTHCARE MAINTENANCE: ICD-10-CM

## 2024-08-12 DIAGNOSIS — Z00.00 HEALTHCARE MAINTENANCE: Primary | ICD-10-CM

## 2024-08-12 DIAGNOSIS — G47.30 SLEEP APNEA, UNSPECIFIED TYPE: ICD-10-CM

## 2024-08-12 PROBLEM — E66.811 CLASS 1 OBESITY DUE TO EXCESS CALORIES WITH SERIOUS COMORBIDITY AND BODY MASS INDEX (BMI) OF 30.0 TO 30.9 IN ADULT: Status: ACTIVE | Noted: 2024-08-12

## 2024-08-12 LAB
25(OH)D3 SERPL-MCNC: 59 NG/ML (ref 30–100)
ALBUMIN SERPL BCP-MCNC: 4.4 G/DL (ref 3.4–5)
ALP SERPL-CCNC: 66 U/L (ref 33–110)
ALT SERPL W P-5'-P-CCNC: 43 U/L (ref 7–45)
ANION GAP SERPL CALC-SCNC: 14 MMOL/L (ref 10–20)
AST SERPL W P-5'-P-CCNC: 26 U/L (ref 9–39)
BASOPHILS # BLD AUTO: 0.04 X10*3/UL (ref 0–0.1)
BASOPHILS NFR BLD AUTO: 1.6 %
BILIRUB SERPL-MCNC: 0.3 MG/DL (ref 0–1.2)
BUN SERPL-MCNC: 23 MG/DL (ref 6–23)
CALCIUM SERPL-MCNC: 8.8 MG/DL (ref 8.6–10.3)
CHLORIDE SERPL-SCNC: 107 MMOL/L (ref 98–107)
CHOLEST SERPL-MCNC: 166 MG/DL (ref 0–199)
CHOLESTEROL/HDL RATIO: 2.5
CO2 SERPL-SCNC: 25 MMOL/L (ref 21–32)
CREAT SERPL-MCNC: 0.71 MG/DL (ref 0.5–1.05)
EGFRCR SERPLBLD CKD-EPI 2021: >90 ML/MIN/1.73M*2
EOSINOPHIL # BLD AUTO: 0.03 X10*3/UL (ref 0–0.7)
EOSINOPHIL NFR BLD AUTO: 1.2 %
ERYTHROCYTE [DISTWIDTH] IN BLOOD BY AUTOMATED COUNT: 13.1 % (ref 11.5–14.5)
EST. AVERAGE GLUCOSE BLD GHB EST-MCNC: 120 MG/DL
GLUCOSE SERPL-MCNC: 92 MG/DL (ref 74–99)
HBA1C MFR BLD: 5.8 %
HCT VFR BLD AUTO: 34.5 % (ref 36–46)
HDLC SERPL-MCNC: 66.3 MG/DL
HGB BLD-MCNC: 11.8 G/DL (ref 12–16)
IMM GRANULOCYTES # BLD AUTO: 0.01 X10*3/UL (ref 0–0.7)
IMM GRANULOCYTES NFR BLD AUTO: 0.4 % (ref 0–0.9)
LDLC SERPL CALC-MCNC: 83 MG/DL
LYMPHOCYTES # BLD AUTO: 0.62 X10*3/UL (ref 1.2–4.8)
LYMPHOCYTES NFR BLD AUTO: 24.1 %
MCH RBC QN AUTO: 34.8 PG (ref 26–34)
MCHC RBC AUTO-ENTMCNC: 34.2 G/DL (ref 32–36)
MCV RBC AUTO: 102 FL (ref 80–100)
MONOCYTES # BLD AUTO: 0.2 X10*3/UL (ref 0.1–1)
MONOCYTES NFR BLD AUTO: 7.8 %
NEUTROPHILS # BLD AUTO: 1.67 X10*3/UL (ref 1.2–7.7)
NEUTROPHILS NFR BLD AUTO: 64.9 %
NON HDL CHOLESTEROL: 100 MG/DL (ref 0–149)
NRBC BLD-RTO: 0 /100 WBCS (ref 0–0)
PLATELET # BLD AUTO: 237 X10*3/UL (ref 150–450)
POTASSIUM SERPL-SCNC: 4.5 MMOL/L (ref 3.5–5.3)
PROT SERPL-MCNC: 6.9 G/DL (ref 6.4–8.2)
RBC # BLD AUTO: 3.39 X10*6/UL (ref 4–5.2)
SODIUM SERPL-SCNC: 141 MMOL/L (ref 136–145)
TRIGL SERPL-MCNC: 86 MG/DL (ref 0–149)
TSH SERPL-ACNC: 0.7 MIU/L (ref 0.44–3.98)
VIT B12 SERPL-MCNC: 393 PG/ML (ref 211–911)
VLDL: 17 MG/DL (ref 0–40)
WBC # BLD AUTO: 2.6 X10*3/UL (ref 4.4–11.3)

## 2024-08-12 PROCEDURE — 83036 HEMOGLOBIN GLYCOSYLATED A1C: CPT

## 2024-08-12 PROCEDURE — 80061 LIPID PANEL: CPT

## 2024-08-12 PROCEDURE — 85025 COMPLETE CBC W/AUTO DIFF WBC: CPT

## 2024-08-12 PROCEDURE — 3008F BODY MASS INDEX DOCD: CPT | Performed by: FAMILY MEDICINE

## 2024-08-12 PROCEDURE — 82306 VITAMIN D 25 HYDROXY: CPT

## 2024-08-12 PROCEDURE — 80053 COMPREHEN METABOLIC PANEL: CPT

## 2024-08-12 PROCEDURE — 99214 OFFICE O/P EST MOD 30 MIN: CPT | Performed by: FAMILY MEDICINE

## 2024-08-12 PROCEDURE — 99396 PREV VISIT EST AGE 40-64: CPT | Performed by: FAMILY MEDICINE

## 2024-08-12 PROCEDURE — 36415 COLL VENOUS BLD VENIPUNCTURE: CPT

## 2024-08-12 PROCEDURE — 1036F TOBACCO NON-USER: CPT | Performed by: FAMILY MEDICINE

## 2024-08-12 PROCEDURE — 84443 ASSAY THYROID STIM HORMONE: CPT

## 2024-08-12 PROCEDURE — 82607 VITAMIN B-12: CPT

## 2024-08-12 RX ORDER — HYDROXYZINE HYDROCHLORIDE 25 MG/1
25 TABLET, FILM COATED ORAL EVERY 4 HOURS PRN
Qty: 30 TABLET | Refills: 3 | Status: SHIPPED | OUTPATIENT
Start: 2024-08-12

## 2024-08-12 RX ORDER — DOCUSATE SODIUM 100 MG/1
100 CAPSULE, LIQUID FILLED ORAL DAILY
COMMUNITY

## 2024-08-12 RX ORDER — BUSPIRONE HYDROCHLORIDE 15 MG/1
15 TABLET ORAL 2 TIMES DAILY
Qty: 180 TABLET | Refills: 3 | Status: SHIPPED | OUTPATIENT
Start: 2024-08-12

## 2024-08-12 ASSESSMENT — PATIENT HEALTH QUESTIONNAIRE - PHQ9
2. FEELING DOWN, DEPRESSED OR HOPELESS: NOT AT ALL
1. LITTLE INTEREST OR PLEASURE IN DOING THINGS: NOT AT ALL
SUM OF ALL RESPONSES TO PHQ9 QUESTIONS 1 AND 2: 0

## 2024-08-12 NOTE — ASSESSMENT & PLAN NOTE
Continue to work on healthy diet and exercise.  Screening blood work ordered.  Pap smear is up-to-date.  Discussed HPV vaccine.   Ivermectin Counseling:  Patient instructed to take medication on an empty stomach with a full glass of water.  Patient informed of potential adverse effects including but not limited to nausea, diarrhea, dizziness, itching, and swelling of the extremities or lymph nodes.  The patient verbalized understanding of the proper use and possible adverse effects of ivermectin.  All of the patient's questions and concerns were addressed.

## 2024-08-12 NOTE — PROGRESS NOTES
Subjective   Patient ID: Shannon Recio is a 44 y.o. female who presents for Annual Exam (Annual Wellness).  Patient presents today for physical and follow-up on her chronic medical problems.  She reports that her father was recently diagnosed with Lewy body dementia and her sister with breast cancer.  She has breast cancer that metastasized to the liver.  She continues follow-up with oncology.  She reports that she did notice some hair loss from the Ibrance and has started spironolactone.  She thinks it may be doing a little bit better.  She was started on Effexor for hot flashes and feels that it has also helped with her stress level.  She does take take hydroxyzine occasionally especially to help with sleep.  She denies any chest pain or shortness of breath.  She reports that since her oophorectomy her migraines have improved.  She has stopped her propranolol.  She uses Imitrex and naproxen for migraines.  She denies any other concerns.  HPI  Social History     Socioeconomic History    Marital status:      Spouse name: Not on file    Number of children: Not on file    Years of education: Not on file    Highest education level: Not on file   Occupational History    Not on file   Tobacco Use    Smoking status: Never    Smokeless tobacco: Never   Substance and Sexual Activity    Alcohol use: Yes     Comment: rarely    Drug use: Not Currently    Sexual activity: Not on file   Other Topics Concern    Not on file   Social History Narrative    Not on file     Social Determinants of Health     Financial Resource Strain: Not on file   Food Insecurity: Not on file   Transportation Needs: Not on file   Physical Activity: Not on file   Stress: Not on file   Social Connections: Not on file   Intimate Partner Violence: Not on file   Housing Stability: Not on file     Current Outpatient Medications   Medication Sig Dispense Refill    American ginseng root (AMERICAN GINSENG ORAL) Take by mouth.      ashwagandha  extract 120 mg capsule Take by mouth.      C/sourcherry/celery/grape seed (TART CHERRY ORAL) Take 1 tablet by mouth once daily (M-F).      calcium carbonate (CALCIUM 600 ORAL) Take 1 tablet by mouth once daily.      cholecalciferol (Vitamin D3) 50 mcg (2,000 unit) capsule Take 1 capsule (50 mcg) by mouth once daily.      ciclopirox 1 % shampoo 1 Application      docusate sodium (Colace) 100 mg capsule Take 1 capsule (100 mg) by mouth once daily.      exemestane (Aromasin) 25 mg tablet Take 1 tablet (25 mg total) by mouth once daily.  Take after a meal.  Try to take at the same time each day. 90 tablet 3    GENERIC EXTERNAL MEDICATION Take 1 tablet by mouth once daily. Probiotic/prebiotic      GENERIC EXTERNAL MEDICATION Take 1 tablet by mouth once daily. Tumeric      glucosamine-chondroitin 500-400 mg tablet Take 1 tablet by mouth once daily.      lactase (Lactaid) 3,000 unit tablet Take 1 tablet (3,000 Units) by mouth if needed.      MULTIVIT-MIN-FERROUS FUMARATE ORAL Take by mouth once daily.      naproxen sodium (Anaprox) 550 mg tablet Take 1 tablet (550 mg) by mouth if needed.      palbociclib (Ibrance) 125 mg tablet Take 1 tablet (125 mg total) by mouth once daily.  Take on Days 1 - 21 of a 28 day treatment cycle. 21 tablet 5    spironolactone (Aldactone) 50 mg tablet Take 1 tablet (50 mg) by mouth once daily. 90 tablet 4    SUMAtriptan (Imitrex) 50 mg tablet Take 1 tablet (50 mg) by mouth 1 time if needed for migraine. May repeat dose once in 2 hours if no relief.  Do not exceed 2 doses in 24 hours. 9 tablet 3    triamcinolone (Kenalog) 0.1 % cream Apply 1 Application topically 2 times a day.      venlafaxine XR (Effexor XR) 75 mg 24 hr capsule Take 1 capsule (75 mg) by mouth once daily. Do not crush or chew. 90 capsule 4    busPIRone (Buspar) 15 mg tablet Take 1 tablet (15 mg) by mouth 2 times a day. 180 tablet 3    hydrOXYzine HCL (Atarax) 25 mg tablet Take 1 tablet (25 mg) by mouth every 4 hours if needed  "for itching. 30 tablet 3    palbociclib (Ibrance) 125 mg tablet Take 1 tablet (125 mg total) by mouth once daily.  Take on Days 1 - 21 of a 28 day treatment cycle. (Patient not taking: Reported on 8/12/2024) 21 tablet 5     No current facility-administered medications for this visit.     Family History   Problem Relation Name Age of Onset    Dementia Father      Breast cancer Sister       Review of Systems  Immunization History   Administered Date(s) Administered    Flu vaccine (IIV4), preservative free *Check age/dose* 08/18/2017, 09/21/2018, 09/04/2020, 11/01/2020, 10/07/2021    Flu vaccine, quadrivalent, no egg protein, age 6 month or greater (FLUCELVAX) 09/29/2022, 09/01/2023    Flu vaccine, trivalent, preservative free, age 6 months and greater (Fluarix/Fluzone/Flulaval) 10/14/2008, 10/13/2009, 12/07/2010, 11/01/2011, 01/20/2014, 10/23/2014    Influenza, Unspecified 09/27/2018, 09/30/2019    Influenza, injectable, quadrivalent 09/13/2019    Influenza, seasonal, injectable 09/09/2014, 09/11/2015, 09/02/2016    Moderna SARS-CoV-2 Vaccination 01/22/2021, 02/19/2021    Novel influenza-H1N1-09, preservative-free 03/10/2010    Pfizer COVID-19 vaccine, Fall 2023, 12 years and older, (30mcg/0.3mL) 10/13/2023    Pneumococcal Conjugate PCV 7 1982    Tdap vaccine, age 7 year and older (BOOSTRIX, ADACEL) 03/25/2022       Review of Systems negative except as noted in HPI and Chief complaint.     Objective                 /83 (BP Location: Right arm, Patient Position: Sitting, BP Cuff Size: Adult)   Temp 36.2 °C (97.2 °F)   Ht 1.549 m (5' 1\")   Wt 73 kg (161 lb)   BMI 30.42 kg/m²    Physical Exam  Vitals reviewed.   HENT:      Head: Normocephalic and atraumatic.      Right Ear: Tympanic membrane normal.      Left Ear: Tympanic membrane normal.      Nose: Nose normal.      Mouth/Throat:      Pharynx: Oropharynx is clear.   Eyes:      Extraocular Movements: Extraocular movements intact.      Conjunctiva/sclera: " Conjunctivae normal.      Pupils: Pupils are equal, round, and reactive to light.   Cardiovascular:      Rate and Rhythm: Normal rate and regular rhythm.      Pulses: Normal pulses.   Pulmonary:      Effort: Pulmonary effort is normal.      Breath sounds: Normal breath sounds.   Abdominal:      General: There is no distension.      Palpations: Abdomen is soft.      Tenderness: There is no abdominal tenderness.   Musculoskeletal:         General: Normal range of motion.      Cervical back: Normal range of motion and neck supple.      Right lower leg: No edema.      Left lower leg: No edema.   Lymphadenopathy:      Cervical: No cervical adenopathy.   Neurological:      General: No focal deficit present.      Mental Status: She is alert.       No results found for this or any previous visit (from the past 96 hour(s)).    Assessment/Plan   Problem List Items Addressed This Visit       Anxiety and depression     Patient feels symptoms are overall controlled.  I refilled her BuSpar and hydroxyzine.  She takes this only sparingly.  She also will continue with her Effexor.         Relevant Medications    busPIRone (Buspar) 15 mg tablet    hydrOXYzine HCL (Atarax) 25 mg tablet    Bilateral plantar fasciitis     Patient states symptoms are tolerable.  Continue follow-up with podiatry as needed.         Healthcare maintenance - Primary     Continue to work on healthy diet and exercise.  Screening blood work ordered.  Pap smear is up-to-date.  Discussed HPV vaccine.         Relevant Orders    Lipid Panel    Fatigue     Patient complaining of fatigue even when she gets a good night sleep.  Plan to check blood work as ordered.         Relevant Orders    Vitamin D 25-Hydroxy,Total (for eval of Vitamin D levels)    Vitamin B12    TSH with reflex to Free T4 if abnormal    CBC and Auto Differential    Comprehensive Metabolic Panel    Class 1 obesity due to excess calories with serious comorbidity and body mass index (BMI) of 30.0 to  30.9 in adult    Hyperglycemia     Blood sugar has been mildly elevated on previous blood work.  Continue to work on healthy diet and exercise and will check hemoglobin A1c and CMP.         Relevant Orders    Hemoglobin A1c     Other Visit Diagnoses       Sleep apnea, unspecified type        Relevant Orders    Referral to Adult Sleep Medicine

## 2024-08-12 NOTE — ASSESSMENT & PLAN NOTE
Patient feels symptoms are overall controlled.  I refilled her BuSpar and hydroxyzine.  She takes this only sparingly.  She also will continue with her Effexor.

## 2024-08-12 NOTE — ASSESSMENT & PLAN NOTE
Patient complaining of fatigue even when she gets a good night sleep.  Plan to check blood work as ordered.

## 2024-08-12 NOTE — ASSESSMENT & PLAN NOTE
Blood sugar has been mildly elevated on previous blood work.  Continue to work on healthy diet and exercise and will check hemoglobin A1c and CMP.

## 2024-08-13 ENCOUNTER — TELEPHONE (OUTPATIENT)
Dept: PRIMARY CARE | Facility: CLINIC | Age: 44
End: 2024-08-13
Payer: COMMERCIAL

## 2024-08-13 DIAGNOSIS — R73.9 HYPERGLYCEMIA: Primary | ICD-10-CM

## 2024-09-03 ENCOUNTER — HOSPITAL ENCOUNTER (OUTPATIENT)
Dept: RADIOLOGY | Facility: CLINIC | Age: 44
Discharge: HOME | End: 2024-09-03
Payer: COMMERCIAL

## 2024-09-03 DIAGNOSIS — C50.812 MALIGNANT NEOPLASM OF OVERLAPPING SITES OF LEFT BREAST IN FEMALE, ESTROGEN RECEPTOR POSITIVE (MULTI): ICD-10-CM

## 2024-09-03 DIAGNOSIS — Z17.0 MALIGNANT NEOPLASM OF OVERLAPPING SITES OF LEFT BREAST IN FEMALE, ESTROGEN RECEPTOR POSITIVE (MULTI): ICD-10-CM

## 2024-09-03 PROCEDURE — 78815 PET IMAGE W/CT SKULL-THIGH: CPT | Mod: PET TUMOR SUBSQ TX STRATEGY | Performed by: RADIOLOGY

## 2024-09-03 PROCEDURE — 78815 PET IMAGE W/CT SKULL-THIGH: CPT | Mod: PS

## 2024-09-03 PROCEDURE — A9552 F18 FDG: HCPCS | Performed by: INTERNAL MEDICINE

## 2024-09-03 PROCEDURE — 3430000001 HC RX 343 DIAGNOSTIC RADIOPHARMACEUTICALS: Performed by: INTERNAL MEDICINE

## 2024-09-03 RX ORDER — FLUDEOXYGLUCOSE F 18 200 MCI/ML
12.8 INJECTION, SOLUTION INTRAVENOUS
Status: COMPLETED | OUTPATIENT
Start: 2024-09-03 | End: 2024-09-03

## 2024-09-03 NOTE — PROGRESS NOTES
Patient ID: Shannon Recio is a 44 y.o. female.  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 Shannon Recio is a 45 y/o F who presents for follow-up for left breast cancer.     Blood work today shows chemistry panel is unremarkable with a slightly elevated ALT, CBC with stable anemia Hbg 11.6 and WBC 2.4.    PET CT on 9/3/24 shows no evidence of hypermetabolic locoregional disease recurrence and no hypermetabolic richard or distant malignancy.    Patient reports she is feeling good. No LLE, she does wear compression stockings when at work since she is standing all day. She is able to keep up working. No new joint pain. No other major complaints at this time.     Patient's past medical history, surgical history, family history and social history reviewed.    Objective      Vitals:    09/05/24 0913   BP: 116/79   Pulse: 88   Resp: 16   Temp: 36.4 °C (97.5 °F)   SpO2: 94%       Review of Systems:   Review of Systems:    Positive per HPI, otherwise negative.    Physical Exam  Gen: appears well in clinic, NAD  HEENT: atraumatic head, normocephalic, EOMI, conjunctiva normal  LUNG: no increased WOB, CTAB  CV: No JVD. RRR  GI: soft, NT, ND  LE: no LE edema  Skin: no obvious rashes or lesions on visible skin  Neuro: interactive, no focal deficits noted  Psych: normal mood and affect  Performance Status:  Symptomatic; fully ambulatory    Labs/Imaging/Pathology: personally reviewed reports and images in Epic electronic medical record system. Pertinent results as it related to the plan represented in below in assessment and plan.   Assessment/Plan   1. Left breast IDC, stage IIB [id number], ER>95% PA>95% ,  HER2 neg, low risk mammaprint  2. Biopsy proven metastases to liver 8/1/22, hormone positive     - initially palpated a left breast mass with nipple changes in 3/21/2021 and she was referred to Dr. Waterman - diagnostic mammogram with tomosynthesis on 3/30/2022 and there was a mass highly suspicious of malignancy in the left breast. No findings in  the right breast. She underwent subsequently a ultrasound on the same day and she was found to have an irregular spiculated hypoechoic shadowing mass measuring 1.9 x 1.6 x 2.9 cm at 1 o'clock, 3 cm from the nipple. There were 8 lymph nodes noted in the  left axilla with 2-3 lymph nodes of intermediate suspicion of involvement  - core needle biopsy on 4/11/2022 and final pathology shows invasive ductal carcinoma, grade 2, in DCIS in the breast mass. The most suspicious lymph node was also biopsied and negative for carcinoma  - 5/13/22 bilateral mastectomy. Left breast showed invasive ductal carcinoma, grade 3 with DCIS. Final tumor size was 3.4 cm. Grade 3 with no extensive intraductal component. There was indeterminant left LVI. DCIS was present. All margins were negative.  There was also macrometastatic carcinoma in 2/4 lymph nodes. End stage N1a. Given stage 2 disease, she did not have staging scans completed  - initially discussed with her low risk mammaprint despite higher clinical risk with node involvement, ultimately decided to not pursue adjuvant chemotherapy   - during CT sim for RT planning, found to have concerning breast lesion  - Liver biopsy 8/1/22 consistent with primary breast cancer  - today discussed new diagnosis of metastatic disease and overall approach to therapy and prognosis  - Reviewed first line therapy with CDK 4/6 inhibitor plus AI inhibitor  - Regarding choice of CDK4/6 inhibitor, given liver involvement I discussed with patient that although there is some more longer survival data with ribociclib, I would be concerned about risk of  hepatotoxicity as if there is no other distant disease we  could consider treatment of oligometastatic disease  - 8/2022- started Ibrance 125 mg PO daily D1-21 every 28 days with letrozole 2.5 mg PO daily  - bilateral salpingo-oophorectomy on 10/10/22  - Signatera from 11/7 0.07 MTM/mL  - complete 50 Gy in 25 fractions to her regional nodes and chest wall along with SBRT completed on 5/22/2023. She also underwent SBRT to the liver metastases from 4/4 to 4/11/2023, for a total of 54 Gy in 3 fractions.   - PET CT from 1/23/23, which shows there is no clear evidence of disease.   -  scans from 7/20/23. No new evidence of recurrence.   - imaging from 12/11/23 shows no new evidence of recurrence in the bone, chest, abdomen or pelvis.   - 2/12/24:  - Patient overall doing well on Ibrance, labs unremarkable. No contraindication to proceed with Zometa today.  - She does have some new right rib and chest pain for the last month. Will plan to evaluate further with CT of the chest.  - Will plan for a telephone visit for followup on the day after CT chest 2/27/24.     3/11/24: Telephone call  - Overall, CT chest from 2/26/24 is unremarkable.   - Blood work from 2/12/24 also unremarkable.   - At this point, we will make no changes and plan for repeat blood work in 3 months.  - RTC in 3 months.      6/3/24:   - No remarkable findings on exam today.  - Labs today are pending.  - Overall tolerating Ibrance  - She has started these supplements and met with Dr. Collazo.  - Refills provided for Ibrance and exemestane.  - Today, she is worried about her hair loss. Recently started topical minoxidil.  - We'll check testosterone level as she is does have a family history of PCOS.  - I will also add spironolactone 50 mg daily, which we can increase to 100 mg if she tolerates it.  - Plan for PET scan in three months. It'll be two years at that point of being on a CDK 46 inhibitor.  - We could consider stopping the Ibrance and continuing  exemestane alone.  - RTC in three months     7/15/2024:   - Patient presents for problem focused visit   - She had an infected are of her left upper thigh/groin area, possible infected hair follicle or ingrown hair   - s/p doxycycline 100 mg BID x10 days with improvement of symptoms after site opened and drained during ABX course   - Now with healing skin that is closed and no signs of infection, there is still a small bump under her skin at the site that was not there prior to infection per patient, therefore, we will have her continue to monitor the site while in the shower so that site stays clean. She was advised to call us if she notices the area enlarging, developing infectious signs/symptoms, or does not go away. Or if new areas of lumps/bumps arise  - There is no inguinal lymphadenopathy on exam today and no edema noted in groin or leg area   - Educated the patient that likely the body will absorb the area of post-infection as it continues to heal and bump will go away   - Since she is otherwise feeling normal and well we do not need lab work today and no further testing at this time   - Above information and findings reviewed with Dr. Kumar as well   - Patient will return as scheduled in September for next visit after a PET scan unless she has new/worsening/persisting symptoms prior      9/5/24:  - Recent PET CT yesterday shows no FGD avid disease.  - Patient is now 2 years out.  - We discussed there is really no data to stop treatment.  - Due to some fatigue we discussed we will plan to cut back Ibrance to 100 mg to see if this helps with the fatigue.  - Will plan to continue Zometa for 3-5 years every 6 months.  - Next CT c/a/p  and bone scan in 6 months.  - In the future we can try and get her scans and Zometa on schedule together.  - RTC in 3 with me for Zometa and in 6 months for scans.     Reviewed ongoing medical problems and how they relate to her breast cancer, will continue long term  monitoring.    RTC in 3 months. This note has been transcribed using a medical scribe and there is a possibility of unintentional typing misprints.     Diagnoses and all orders for this visit:  Malignant neoplasm of overlapping sites of left breast in female, estrogen receptor positive (Multi)  -     Clinic Appointment Request  -     CT chest abdomen pelvis w IV contrast; Future  -     NM bone whole body; Future  -     Clinic Appointment Request; Future  -     CBC and Auto Differential; Future  -     Comprehensive metabolic panel; Future  -     signatera; Other-indicate in comment; unknown - Miscellaneous Test; Future  -     Cancer Antigen 27-29; Future         Sindhu uKmar MD  Hematology/Oncology  [facility]    Scribe Attestation  By signing my name below, IPrabha Scribe   attest that this documentation has been prepared under the direction and in the presence of Sindhu Kumar MD.     Time Spent  Prep time on day of patient encounter: 5 minutes  Time spent directly with patient, family or caregiver: 15 minutes  Additional Time Spent on Patient Care Activities: 5 minutes  Documentation Time: 5 minutes  Other Time Spent: 0 minutes  Total: 30 minutes

## 2024-09-05 ENCOUNTER — ONCOLOGY MEDICATION OUTREACH (OUTPATIENT)
Dept: HEMATOLOGY/ONCOLOGY | Facility: CLINIC | Age: 44
End: 2024-09-05

## 2024-09-05 ENCOUNTER — OFFICE VISIT (OUTPATIENT)
Dept: HEMATOLOGY/ONCOLOGY | Facility: CLINIC | Age: 44
End: 2024-09-05
Payer: COMMERCIAL

## 2024-09-05 ENCOUNTER — LAB (OUTPATIENT)
Dept: LAB | Facility: CLINIC | Age: 44
End: 2024-09-05
Payer: COMMERCIAL

## 2024-09-05 VITALS
TEMPERATURE: 97.5 F | RESPIRATION RATE: 16 BRPM | DIASTOLIC BLOOD PRESSURE: 79 MMHG | WEIGHT: 174.38 LBS | SYSTOLIC BLOOD PRESSURE: 116 MMHG | HEART RATE: 88 BPM | OXYGEN SATURATION: 94 % | BODY MASS INDEX: 32.95 KG/M2

## 2024-09-05 DIAGNOSIS — Z17.0 MALIGNANT NEOPLASM OF OVERLAPPING SITES OF LEFT BREAST IN FEMALE, ESTROGEN RECEPTOR POSITIVE (MULTI): ICD-10-CM

## 2024-09-05 DIAGNOSIS — C50.812 MALIGNANT NEOPLASM OF OVERLAPPING SITES OF LEFT BREAST IN FEMALE, ESTROGEN RECEPTOR POSITIVE (MULTI): ICD-10-CM

## 2024-09-05 LAB
ALBUMIN SERPL BCP-MCNC: 4.4 G/DL (ref 3.4–5)
ALP SERPL-CCNC: 62 U/L (ref 33–110)
ALT SERPL W P-5'-P-CCNC: 60 U/L (ref 7–45)
ANION GAP SERPL CALC-SCNC: 12 MMOL/L (ref 10–20)
AST SERPL W P-5'-P-CCNC: 31 U/L (ref 9–39)
BASOPHILS # BLD AUTO: 0.03 X10*3/UL (ref 0–0.1)
BASOPHILS NFR BLD AUTO: 1.3 %
BILIRUB SERPL-MCNC: 0.3 MG/DL (ref 0–1.2)
BUN SERPL-MCNC: 20 MG/DL (ref 6–23)
CALCIUM SERPL-MCNC: 9 MG/DL (ref 8.6–10.3)
CANCER AG27-29 SERPL-ACNC: 22.3 U/ML (ref 0–38.6)
CHLORIDE SERPL-SCNC: 104 MMOL/L (ref 98–107)
CO2 SERPL-SCNC: 28 MMOL/L (ref 21–32)
CREAT SERPL-MCNC: 0.88 MG/DL (ref 0.5–1.05)
EGFRCR SERPLBLD CKD-EPI 2021: 84 ML/MIN/1.73M*2
EOSINOPHIL # BLD AUTO: 0.01 X10*3/UL (ref 0–0.7)
EOSINOPHIL NFR BLD AUTO: 0.4 %
ERYTHROCYTE [DISTWIDTH] IN BLOOD BY AUTOMATED COUNT: 13.3 % (ref 11.5–14.5)
GLUCOSE SERPL-MCNC: 105 MG/DL (ref 74–99)
HCT VFR BLD AUTO: 34 % (ref 36–46)
HGB BLD-MCNC: 11.6 G/DL (ref 12–16)
IMM GRANULOCYTES # BLD AUTO: 0 X10*3/UL (ref 0–0.7)
IMM GRANULOCYTES NFR BLD AUTO: 0 % (ref 0–0.9)
LYMPHOCYTES # BLD AUTO: 0.52 X10*3/UL (ref 1.2–4.8)
LYMPHOCYTES NFR BLD AUTO: 22.1 %
MCH RBC QN AUTO: 35 PG (ref 26–34)
MCHC RBC AUTO-ENTMCNC: 34.1 G/DL (ref 32–36)
MCV RBC AUTO: 103 FL (ref 80–100)
MONOCYTES # BLD AUTO: 0.48 X10*3/UL (ref 0.1–1)
MONOCYTES NFR BLD AUTO: 20.4 %
NEUTROPHILS # BLD AUTO: 1.31 X10*3/UL (ref 1.2–7.7)
NEUTROPHILS NFR BLD AUTO: 55.8 %
PLATELET # BLD AUTO: 165 X10*3/UL (ref 150–450)
POTASSIUM SERPL-SCNC: 4.5 MMOL/L (ref 3.5–5.3)
PROT SERPL-MCNC: 6.8 G/DL (ref 6.4–8.2)
RBC # BLD AUTO: 3.31 X10*6/UL (ref 4–5.2)
SODIUM SERPL-SCNC: 139 MMOL/L (ref 136–145)
WBC # BLD AUTO: 2.4 X10*3/UL (ref 4.4–11.3)

## 2024-09-05 PROCEDURE — 36415 COLL VENOUS BLD VENIPUNCTURE: CPT

## 2024-09-05 PROCEDURE — 99214 OFFICE O/P EST MOD 30 MIN: CPT | Performed by: INTERNAL MEDICINE

## 2024-09-05 PROCEDURE — G2211 COMPLEX E/M VISIT ADD ON: HCPCS | Performed by: INTERNAL MEDICINE

## 2024-09-05 PROCEDURE — 85025 COMPLETE CBC W/AUTO DIFF WBC: CPT

## 2024-09-05 PROCEDURE — 80053 COMPREHEN METABOLIC PANEL: CPT

## 2024-09-05 PROCEDURE — 86300 IMMUNOASSAY TUMOR CA 15-3: CPT

## 2024-09-05 ASSESSMENT — PAIN SCALES - GENERAL: PAINLEVEL: 0-NO PAIN

## 2024-09-05 NOTE — PROGRESS NOTES
Shannon Recio is a 44 y.o. year old female patient who had a patient care encounter with Dr. Kumar on 9/5/24 for ongoing management of her MBC.  Shannon is currently being treated with palbociclib + exemestane.    Results from last 7 days   Lab Units 09/05/24  0905   WBC AUTO x10*3/uL 2.4*   HEMOGLOBIN g/dL 11.6*   HEMATOCRIT % 34.0*   PLATELETS AUTO x10*3/uL 165   NEUTROS PCT AUTO % 55.8   LYMPHS PCT AUTO % 22.1   MONOS PCT AUTO % 20.4   EOS PCT AUTO % 0.4      Results from last 7 days   Lab Units 09/05/24  0905   SODIUM mmol/L 139   POTASSIUM mmol/L 4.5   CHLORIDE mmol/L 104   CO2 mmol/L 28   BUN mg/dL 20   CREATININE mg/dL 0.88   CALCIUM mg/dL 9.0   PROTEIN TOTAL g/dL 6.8   BILIRUBIN TOTAL mg/dL 0.3   ALK PHOS U/L 62   ALT U/L 60*   AST U/L 31   GLUCOSE mg/dL 105*      CA 27.29 and Signatera pending…   PET-CT on 9/3/24:  no evidence of recurrence.      Per Dr. Kumar, will continue tx with palbociclib at this time, but reduce dose to 100 mg.     Per collaborative practice agreement with Dr. Kumar, on 9/5/24 I revised the existing [place] plan for palbociclib 100 mg tablet PO once daily on D1 - 21 of a 28-day cycle.  Qty # 21 with 5 refills.  The prescription was sent to Dr. Kumar for approval, pending subsequent electronic transmission to [facility].    Next FUV is in approx. 3 months.      Jose Ocampo, [facility], MS, BCOP  Clinical Pharmacist Specialist - Ambulatory Oncology

## 2024-09-06 DIAGNOSIS — C50.812 MALIGNANT NEOPLASM OF OVERLAPPING SITES OF LEFT BREAST IN FEMALE, ESTROGEN RECEPTOR POSITIVE (MULTI): Primary | ICD-10-CM

## 2024-09-06 DIAGNOSIS — Z17.0 MALIGNANT NEOPLASM OF OVERLAPPING SITES OF LEFT BREAST IN FEMALE, ESTROGEN RECEPTOR POSITIVE (MULTI): Primary | ICD-10-CM

## 2024-09-18 ENCOUNTER — APPOINTMENT (OUTPATIENT)
Dept: DERMATOLOGY | Facility: CLINIC | Age: 44
End: 2024-09-18
Payer: COMMERCIAL

## 2024-09-18 ENCOUNTER — ALLIED HEALTH (OUTPATIENT)
Dept: INTEGRATIVE MEDICINE | Facility: CLINIC | Age: 44
End: 2024-09-18
Payer: COMMERCIAL

## 2024-09-18 DIAGNOSIS — R23.2 HOT FLASHES: ICD-10-CM

## 2024-09-18 DIAGNOSIS — Z17.0 MALIGNANT NEOPLASM OF OVERLAPPING SITES OF LEFT BREAST IN FEMALE, ESTROGEN RECEPTOR POSITIVE: Primary | ICD-10-CM

## 2024-09-18 DIAGNOSIS — F41.9 ANXIETY: ICD-10-CM

## 2024-09-18 DIAGNOSIS — Z12.83 ENCOUNTER FOR SCREENING FOR MALIGNANT NEOPLASM OF SKIN: ICD-10-CM

## 2024-09-18 DIAGNOSIS — F41.9 ANXIETY AND DEPRESSION: ICD-10-CM

## 2024-09-18 DIAGNOSIS — L82.1 SEBORRHEIC KERATOSIS: ICD-10-CM

## 2024-09-18 DIAGNOSIS — Z80.8 FAMILY HISTORY OF SKIN CANCER: ICD-10-CM

## 2024-09-18 DIAGNOSIS — L73.9 FOLLICULITIS: Primary | ICD-10-CM

## 2024-09-18 DIAGNOSIS — F32.A ANXIETY AND DEPRESSION: ICD-10-CM

## 2024-09-18 DIAGNOSIS — D18.01 HEMANGIOMA OF SKIN: ICD-10-CM

## 2024-09-18 DIAGNOSIS — L21.9 SEBORRHEIC DERMATITIS: ICD-10-CM

## 2024-09-18 DIAGNOSIS — C50.812 MALIGNANT NEOPLASM OF OVERLAPPING SITES OF LEFT BREAST IN FEMALE, ESTROGEN RECEPTOR POSITIVE: Primary | ICD-10-CM

## 2024-09-18 DIAGNOSIS — L81.4 LENTIGO: ICD-10-CM

## 2024-09-18 DIAGNOSIS — D22.9 MELANOCYTIC NEVUS, UNSPECIFIED LOCATION: ICD-10-CM

## 2024-09-18 PROCEDURE — 99214 OFFICE O/P EST MOD 30 MIN: CPT | Performed by: DERMATOLOGY

## 2024-09-18 PROCEDURE — 99214 OFFICE O/P EST MOD 30 MIN: CPT | Performed by: HOSPITALIST

## 2024-09-18 PROCEDURE — 1036F TOBACCO NON-USER: CPT | Performed by: DERMATOLOGY

## 2024-09-18 RX ORDER — CICLOPIROX 1 G/100ML
1 SHAMPOO TOPICAL DAILY
Qty: 120 ML | Refills: 11 | Status: SHIPPED | OUTPATIENT
Start: 2024-09-18

## 2024-09-18 RX ORDER — CLINDAMYCIN PHOSPHATE 10 MG/G
GEL TOPICAL DAILY
Qty: 60 G | Refills: 11 | Status: SHIPPED | OUTPATIENT
Start: 2024-09-18 | End: 2025-09-18

## 2024-09-18 ASSESSMENT — DERMATOLOGY QUALITY OF LIFE (QOL) ASSESSMENT
WHAT SINGLE SKIN CONDITION LISTED BELOW IS THE PATIENT ANSWERING THE QUALITY-OF-LIFE ASSESSMENT QUESTIONS ABOUT: NONE OF THE ABOVE
DATE THE QUALITY-OF-LIFE ASSESSMENT WAS COMPLETED: 67101
RATE HOW BOTHERED YOU ARE BY EFFECTS OF YOUR SKIN PROBLEMS ON YOUR ACTIVITIES (EG, GOING OUT, ACCOMPLISHING WHAT YOU WANT, WORK ACTIVITIES OR YOUR RELATIONSHIPS WITH OTHERS): 0 - NEVER BOTHERED
RATE HOW BOTHERED YOU ARE BY SYMPTOMS OF YOUR SKIN PROBLEM (EG, ITCHING, STINGING BURNING, HURTING OR SKIN IRRITATION): 0 - NEVER BOTHERED
ARE THERE EXCLUSIONS OR EXCEPTIONS FOR THE QUALITY OF LIFE ASSESSMENT: NO
RATE HOW EMOTIONALLY BOTHERED YOU ARE BY YOUR SKIN PROBLEM (FOR EXAMPLE, WORRY, EMBARRASSMENT, FRUSTRATION): 0 - NEVER BOTHERED

## 2024-09-18 ASSESSMENT — DERMATOLOGY PATIENT ASSESSMENT
DO YOU USE SUNSCREEN: OCCASIONALLY
HAVE YOU HAD OR DO YOU HAVE VASCULAR DISEASE: NO
DO YOU USE A TANNING BED: NO
HAVE YOU HAD OR DO YOU HAVE A STAPH INFECTION: NO
DO YOU HAVE ANY NEW OR CHANGING LESIONS: NO
ARE YOU AN ORGAN TRANSPLANT RECIPIENT: NO

## 2024-09-18 ASSESSMENT — PATIENT GLOBAL ASSESSMENT (PGA): PATIENT GLOBAL ASSESSMENT: PATIENT GLOBAL ASSESSMENT:  1 - CLEAR

## 2024-09-18 ASSESSMENT — ITCH NUMERIC RATING SCALE: HOW SEVERE IS YOUR ITCHING?: 0

## 2024-09-18 NOTE — PROGRESS NOTES
Subjective     Shannon Recio is a 44 y.o. female who presents for the following: Skin Check (annual).     Skin Cancer Screening  She has a history of moderate sun exposure. She is in the sun occasionally. She uses sunscreen occasionally. She reports no skin symptoms. Her moles are not changing.    Spots that concern her: None          Review of Systems:  No other skin or systemic complaints other than what is documented elsewhere in the note.    The following portions of the chart were reviewed this encounter and updated as appropriate:       Skin Cancer History  No skin cancer on file.    Specialty Problems          Dermatology Problems    Contusion of knee    Acne vulgaris    Hemangioma of skin and subcutaneous tissue    Irritant contact dermatitis, unspecified cause    Melanocytic nevi of lower limb, including hip    Other benign neoplasm of skin, unspecified    Other melanin hyperpigmentation    Other seborrheic keratosis    Seborrheic dermatitis of scalp    Tinea pedis    Rash, skin    Wart     Past Medical History:  Shannon Recio  has a past medical history of Other specified complications of surgical and medical care, not elsewhere classified, sequela (05/26/2022), Personal history of other diseases of the digestive system, and Personal history of other infectious and parasitic diseases.    Past Surgical History:  Shannon Recio  has a past surgical history that includes Tonsillectomy (08/20/2013); Other surgical history (02/04/2022); Other surgical history (06/30/2022); Colposcopy (09/13/2017); Hemorrhoid surgery (09/13/2017); Mouth surgery (02/04/2022); US guided needle liver biopsy (08/01/2022); Oophorectomy (10/2022); and Breast reconstruction (07/31/2023).    Family History:  Patient family history includes Breast cancer in her sister; Dementia in her father; Skin cancer in her mother.       Objective   Well appearing patient in no apparent distress; mood and affect are within normal  limits.    A full examination was performed including scalp, head, eyes, ears, nose, lips, neck, chest, axillae, abdomen, back, buttocks, bilateral upper extremities, bilateral lower extremities, hands, feet, fingers, toes, fingernails, and toenails. All findings within normal limits unless otherwise noted below.    Assessment/Plan   1. Folliculitis  Right Medial Thigh  Follicularly-based erythematous papules and pustules.    The nature of the diagnosis was explained to patient. Will plan to treat with clindamycin BID x 2 weeks then PRN for flares. Risks, benefits, side effects, alternatives and options were discussed with patient and the patient voiced understanding. Pt agrees with plan as above.       Related Medications  clindamycin (Cleocin T) 1 % gel  Apply topically once daily.    2. Encounter for screening for malignant neoplasm of skin    3. Family history of skin cancer    4. Hemangioma of skin  Scattered cherry-red papule(s).    5. Melanocytic nevus, unspecified location  All nevi were symmetric brown macules without atypia on dermoscopy.     The ABCDEs of melanoma and warning signs of non-melanoma skin cancer were discussed with patient and patient expressed understanding. Sun protection and use of at least SPF 30 discussed with patient. Pt instructed to reapply every 2 hours.     6. Lentigo  Scattered tan macules in sun-exposed areas.    The ABCDEs of melanoma and warning signs of non-melanoma skin cancer were discussed with patient and patient expressed understanding. Sun protection and use of at least SPF 30 discussed with patient. Pt instructed to reapply every 2 hours.     7. Seborrheic keratosis  Stuck on verrucous, tan-brown papules and plaques.      8. Seborrheic dermatitis  Clear on exam      Pt well controlled on ciclopirox shampoo; refills sent.     Related Medications  ciclopirox 1 % shampoo  Apply 1 Application topically once daily. To wet scalp; leave in for 5-10 minutes then as needed for  flares       Follow up in 1 year or sooner as needed.

## 2024-09-18 NOTE — PROGRESS NOTES
"Patient ID: Shannon Recio is a 43 y.o. female.  Referring Physician: Luis Kohli, APRN-CNP  [address] [facility] [address]  [place] [address]  Primary Care Provider: Lee Ann Sellers MD     CANCER HISTORY:   42 yo woman with h/o L breast IDC, stage IIB, pT2N1a, ER>95%, HER 2 neg, low risk mammaprint  Biopsy proven liver mets 8/22 - ER+  S/p bilat mastectomy 5/22, no adjuvant chemo initially  Current Treatment: Letrozole, ibrance, zometa, lupron  Following signatera  S/p reconstructive surgery  4/23 SBRT to liver (4 mets), regional LN's and chest wall     INTEGRATIVE HISTORY:  Symptoms:  Arthralgias - has had plantar fasciitis, flat feet  Takes 15 min before walking without pain  Mostly in the morning - contd     Fatigue - ongoing, snoring more - saw someone in sleep clinic, at home sleep study, hoping that improved sleep with help her with energy  Manageable but always tired  B12, ashwaghanda     Anxiety - long history of anxiety - overall ok right now, taking buspar, propanolol for migraines     Hot flashes - 10/22 ovaries removed, started then.  Occurs several times a day, now on effexor   Chart Jorden, turmeric  Now about once/night  Worse with anxiety, still some at work  Feels hot but less hot flashes overall     Diet: working on it as well, has a sweet tooth  Eating breakfast more often - oats, yogurt  Inconsistent  Gained 10 lbs in last year - weights 150 lbs, 5 ft 1     PA: not very active, not exercising consistently, walking around the block  Growing up used to do a lot of sports, dancing    Sleep: stays up later than she should doing housework, on the phone etc.  Falls asleep easily and stays asleep ok too.  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  There were no vitals taken for this visit.     PHYSICAL EXAM:  An interactive audio and video telecommunication system which permits real time communications between the patient (at the originating site) and provider (at the distant site) was utilized to provide this telehealth service.    Verbal consent was requested and obtained on this date for a telehealth visit.         RESULTS:            Lab Results      Assessment/Plan   Cancer Staging   No matching staging information was found for the patient.     CANCER SPECIFIC RECCS:  42 yo woman with h/o L breast IDC, stage IIB, pT2N1a, ER>95%, HER 2 neg, low risk mammaprint  Biopsy proven liver mets 8/22 - ER+  S/p bilat mastectomy 5/22, no adjuvant chemo initially  Current Treatment: Letrozole, ibrance, zometa, lupron  Following signatera  S/p reconstructive surgery  4/23 SBRT to liver (4 mets), regional LN's and chest wall     CANCER SPECIFIC RECCS:  Breast cancer:  Whole Foods plant based diet  5-9 fruits/veg/day, Cruciferous Vegetables - Brussel Sprouts, Kale, Broccoli, Cauliflower  [employer] \"New American Plate\"  Organic dairy preferred  Limit sugar - no sugar beverages, no sweets  Consider intermittent fasting 3-5 days per week (not eating for 13 hrs straight) - do not eat after 7 pm  Limit alcohol   Moderate soy is ok (edamame/tofu) once/day  Fiber including flax seeds beneficial     Exercise 30 min/day 5 days a week, vary by balance, cardio and resistance training  Would do it in the morning     Supplements to consider:  Turmeric (kat) - taking right now  [employer] STAMETS 7  Vitamin D3 2015-8254 IU/day  Omega 3 supplementation (nordic naturals) - will start  B12 - taking  Edd     Reading:  An[employer]ancer Living  Cancer Fighting Kitchen   "   Websites:  [employer] Lifestyle Program  Cancerchoices.org  Cook for your Life     Podcast: Integrative Oncology Talk     Apps: Oncio stacie ([email or web address])     Support: [employer] (exercise programs, dietitian, support groups, financial support and services)     SYMPTOM MANAGEMENT:  Arthralgias: on exemestane now  Acupuncture in [facility] symptom management clinic  Arnica tablets or cream (Boiron)  Vitamin D3 8985-8152 IU/day  [place] 3  Deep Blue (DoTerra) or PROZE NERVE  Turmeric (Meghann Turmeric Supreme)  Yoga     Hot flashes - acupuncture - possibly helped  Continue effexor, buspar  Moderate soy     Anxiety - can discuss breathing and meditation later  Father hit head recently which caused stress              HEALTH Tracks  Massage - will start soon     Fatigue - American Ginseng 2 grams/day  Ashwaghanda (Meghann) - fatigue and anxiety  [employer] STAMETS 7  Acupuncture  Reiki - may help with fatigue - may consider  See about the sleep study, increase PA     Follow up: [facility] symptom management clinic as needed  [facility] clinic 6 months

## 2024-10-02 ENCOUNTER — DOCUMENTATION (OUTPATIENT)
Dept: OTHER | Age: 44
End: 2024-10-02
Payer: COMMERCIAL

## 2024-10-02 NOTE — PROGRESS NOTES
Music Therapy Note      Therapy Session  Visit Type: Follow-up visit  Session Start Time: 1330  Intervention Delivery: Virtual visit               Treatment/Interventions  Music Therapy Interventions: Music-assisted relaxation and imagery (KYLEE), Songwriting/composition         Narrative  Assessment Detail: Completed personalized HEALTH Track was delivered to pt today via link in email to pt's preferred email.  Follow-up: MT provided contact info if pt has any questions or needs.    Education Documentation  No documentation found.

## 2024-10-03 LAB — SCAN RESULT: NORMAL

## 2024-10-28 ENCOUNTER — TELEPHONE (OUTPATIENT)
Dept: HEMATOLOGY/ONCOLOGY | Facility: CLINIC | Age: 44
End: 2024-10-28
Payer: COMMERCIAL

## 2024-10-31 DIAGNOSIS — C50.812 MALIGNANT NEOPLASM OF OVERLAPPING SITES OF LEFT BREAST IN FEMALE, ESTROGEN RECEPTOR POSITIVE: Primary | ICD-10-CM

## 2024-10-31 DIAGNOSIS — Z17.0 MALIGNANT NEOPLASM OF OVERLAPPING SITES OF LEFT BREAST IN FEMALE, ESTROGEN RECEPTOR POSITIVE: Primary | ICD-10-CM

## 2024-10-31 RX ORDER — HEPARIN SODIUM,PORCINE/PF 10 UNIT/ML
50 SYRINGE (ML) INTRAVENOUS AS NEEDED
OUTPATIENT
Start: 2024-10-31

## 2024-10-31 RX ORDER — HEPARIN 100 UNIT/ML
500 SYRINGE INTRAVENOUS AS NEEDED
OUTPATIENT
Start: 2024-10-31

## 2024-10-31 RX ORDER — DIPHENHYDRAMINE HYDROCHLORIDE 50 MG/ML
50 INJECTION INTRAMUSCULAR; INTRAVENOUS AS NEEDED
OUTPATIENT
Start: 2025-01-12

## 2024-10-31 RX ORDER — ZOLEDRONIC ACID 0.04 MG/ML
4 INJECTION, SOLUTION INTRAVENOUS ONCE
OUTPATIENT
Start: 2025-01-12

## 2024-10-31 RX ORDER — EPINEPHRINE 0.3 MG/.3ML
0.3 INJECTION SUBCUTANEOUS EVERY 5 MIN PRN
OUTPATIENT
Start: 2025-01-12

## 2024-10-31 RX ORDER — FAMOTIDINE 10 MG/ML
20 INJECTION INTRAVENOUS ONCE AS NEEDED
OUTPATIENT
Start: 2025-01-12

## 2024-10-31 RX ORDER — ALBUTEROL SULFATE 0.83 MG/ML
3 SOLUTION RESPIRATORY (INHALATION) AS NEEDED
OUTPATIENT
Start: 2025-01-12

## 2024-11-11 ENCOUNTER — APPOINTMENT (OUTPATIENT)
Dept: INTEGRATIVE MEDICINE | Facility: CLINIC | Age: 44
End: 2024-11-11
Payer: COMMERCIAL

## 2024-11-11 DIAGNOSIS — M79.10 MYALGIA: ICD-10-CM

## 2024-11-11 DIAGNOSIS — Z17.0 MALIGNANT NEOPLASM OF OVERLAPPING SITES OF LEFT BREAST IN FEMALE, ESTROGEN RECEPTOR POSITIVE: ICD-10-CM

## 2024-11-11 DIAGNOSIS — R23.2 HOT FLASHES: ICD-10-CM

## 2024-11-11 DIAGNOSIS — F41.9 ANXIETY: ICD-10-CM

## 2024-11-11 DIAGNOSIS — F32.A ANXIETY AND DEPRESSION: Primary | ICD-10-CM

## 2024-11-11 DIAGNOSIS — C50.812 MALIGNANT NEOPLASM OF OVERLAPPING SITES OF LEFT BREAST IN FEMALE, ESTROGEN RECEPTOR POSITIVE: ICD-10-CM

## 2024-11-11 DIAGNOSIS — F41.9 ANXIETY AND DEPRESSION: Primary | ICD-10-CM

## 2024-11-11 PROCEDURE — 97140 MANUAL THERAPY 1/> REGIONS: CPT | Performed by: HOSPITALIST

## 2024-11-11 ASSESSMENT — PAIN SCALES - GENERAL: PAINLEVEL_OUTOF10: 0 - NO PAIN

## 2024-11-11 NOTE — PROGRESS NOTES
Massage Therapy Visit:     Shannon Recio was referred by Dr. Collazo.    Condition of Client Subjective :  Patient ID: Shannon Recio goes by Abby, is a 44 y.o. female who presents for a 50 minute Hai Therapy.  Patient is being treated for breast cancer.  Patient stated she has more discomfort on the left side of her torso.  I worked closely with her.  I checked in with her often.  I showed the patient self care tips.  She stated that she recently lost her father.  She is dealing with other personal issues.  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  She is very busy.  She did notice an improvement with her muscle discomfort.  She was able to relax.       Session Information  Visit Type: New patient  Description of present complaint: Muscle tension        Objective   Pre-treatment Assessment  Arrival Mode: Ambulatory  Pain Score: 0 - No pain  Anxiety Level (0-10): 7  Stress Level (0-10): 8  Coping Level (0-10): 6  Depression Level (0-10): 4  Fatigue Level (0-10): 7  Nausea Level (0-10): 0  Wellbeing Level (0-10): 4        Actions Assessment/Plan :  Provider reviewed plan for the massage session, precautions and contraindications. Patient/guardian/hospital staff has given consent to treat with full understanding of what to expect during the session. Before massage therapy began, provider explained to the patient to communicate at any time if the pressure was causing discomfort past their tolerance level. Patient agreed to advise therapist.    Massage Treatment  Patient Position: Table  Positioning Assistance: Did not need assistance  Massage Technique: Relaxation massage  Pressure Scale: 1 - Light pressure, 2 - Mild pressure    Response:  Post-treatment Assessment  Patient Noted Improvement of the Following Symptoms: Muscle tension  0-10 (Numeric) Pain Score: 0 - No pain  Anxiety Level (0-10): 6  Stress Level (0-10): 5  Coping Level (0-10): 6  Depression Level (0-10): 3  Fatigue Level (0-10): 6  Nausea  Level (0-10): 0  Wellbeing Level (0-10): 4    Evaluation:   Patient will follow up as needed.

## 2024-11-12 NOTE — PROGRESS NOTES
Patient: Shannon Recio  : 1980 AGE: 44 y.o. SEX:female   MRN: [id number]   Provider: TAB Kelley-CNP     Location [facility]   Service Date: 2024     PCP: Lee Ann Sellers MD   Referred by: Lee Ann Sellers MD          [facility]  New Visit Note      HISTORY OF PRESENT ILLNESS     Shannon Recio is a 44 y.o. female with a h/o MARYA and metastic breast cancer, anxiety, migraines, vitamin D deficiency, obesity  who presents to [facility].    2024: NPV with concerns of MARYA management.  Patient had HSAT 2024 showing mild MARYA but no CPAP started yet.  Testing was completed due to loud snoring, occasional gasping, unrefreshed sleep, and EDS. Would like to try CPAP. ----> APAP 5-15 via [facility]       SLEEP STUDY HISTORY (personally reviewed raw data such as interpretation report, data sheet, hypnogram, and titration table if available and applicable)  -HSAT 2024-showing mild MARYA with AHI 3% 9.4, AHI 4% 6.8, SpO2 uriah 82.2.  SpO2<= 88% for 19 minutes    SLEEP-WAKE SCHEDULE    Sleep Patterns: In terms of the patient's sleep/wake cycle, she generally gets into bed at approximately midnight.  her latency to sleep onset after lights out is quick. During the night, the patient generally awakens 0-1 times nightly. These awakenings are usually prolonged in duration. Final wake time on weekday mornings is around 6-6:30 AM. TST 6 hours/night. Naps couple times a week for 60-90min which is refreshing.     Compared to weekdays, the patient's sleep schedule is  similar on the weekends. Bed time 1AM, wake time 10AM.     Breathing during sleep: snoring, witnessed apneas, gasping/choking for air, and night sweats  Behaviors at night: No   Sleep paralysis: No   Hypnogogic or hypnopompic hallucinations: No   Cataplexy: No     Leg symptoms and timing:  - Sensations: Patient does not have unusual sensations in their  extremities that cause an urge to move them   - Movement: Patient has not been told that their legs kick or jerk during sleep    Daytime Symptoms:  On awakening patient reports: wake unrefreshed, feels sleepy, morning headaches, morning dry mouth, and hard to get out of bed  Patient report some daytime symptoms including: DAYTIME SYMPTOMS: reports excessive daytime sleepiness sleep inertia late to work/school due to sleepiness irritability during the day feeling sleepy when driving    Sleep environment:  Preferred sleep position: back and side  Room is dark: Yes  Room is quiet: Yes  Room is cool: Yes  Bed comfort: good    SLEEP HABITS  Caffeine consumption: Yes, 1-2 cups/day  Alcohol consumption: Yes, rarely (occasional)  Smoking: No  Marijuana: No  Sleep aids: hydroxyzine or benadryl or melatonin rarely 1x/month or less    WEIGHT: gained 20lbs in 1 year     ESS: 18  NOA: 13    REVIEW OF SYSTEMS     All other systems have been reviewed and are negative.    ALLERGIES     Allergies   Allergen Reactions   • Bactrim [Sulfamethoxazole-Trimethoprim] Hives   • Sulfa (Sulfonamide Antibiotics) Unknown   • Ammonium Lactate Rash   • Pseudoephedrine Palpitations       MEDICATIONS     Current Outpatient Medications   Medication Sig Dispense Refill   • American ginseng root (AMERICAN GINSENG ORAL) Take by mouth.     • ashwagandha extract 120 mg capsule Take by mouth.     • busPIRone (Buspar) 15 mg tablet Take 1 tablet (15 mg) by mouth 2 times a day. 180 tablet 3   • C/sourcherry/celery/grape seed (TART CHERRY ORAL) Take 1 tablet by mouth once daily (M-F).     • calcium carbonate (CALCIUM 600 ORAL) Take 1 tablet by mouth once daily.     • cholecalciferol (Vitamin D3) 50 mcg (2,000 unit) capsule Take 1 capsule (50 mcg) by mouth once daily.     • ciclopirox 1 % shampoo Apply 1 Application topically once daily. To wet scalp; leave in for 5-10 minutes then as needed for flares 120 mL 11   • clindamycin (Cleocin T) 1 % gel Apply  topically once daily. 60 g 11   • docusate sodium (Colace) 100 mg capsule Take 1 capsule (100 mg) by mouth once daily.     • exemestane (Aromasin) 25 mg tablet Take 1 tablet (25 mg total) by mouth once daily.  Take after a meal.  Try to take at the same time each day. 90 tablet 3   • GENERIC EXTERNAL MEDICATION Take 1 tablet by mouth once daily. Probiotic/prebiotic     • GENERIC EXTERNAL MEDICATION Take 1 tablet by mouth once daily. Tumeric     • glucosamine-chondroitin 500-400 mg tablet Take 1 tablet by mouth once daily.     • hydrOXYzine HCL (Atarax) 25 mg tablet Take 1 tablet (25 mg) by mouth every 4 hours if needed for itching. 30 tablet 3   • lactase (Lactaid) 3,000 unit tablet Take 1 tablet (3,000 Units) by mouth if needed.     • MULTIVIT-MIN-FERROUS FUMARATE ORAL Take by mouth once daily.     • naproxen sodium (Anaprox) 550 mg tablet Take 1 tablet (550 mg) by mouth if needed.     • palbociclib (Ibrance) 100 mg tablet Take 1 tablet (100 mg) by mouth once daily for 21 days, then off for 7 days 21 tablet 5   • palbociclib (Ibrance) 125 mg tablet Take 1 tablet (125 mg total) by mouth once daily.  Take on Days 1 - 21 of a 28 day treatment cycle. 21 tablet 5   • palbociclib (Ibrance) 125 mg tablet Take 1 tablet (125 mg total) by mouth once daily.  Take on Days 1 - 21 of a 28 day treatment cycle. 21 tablet 5   • spironolactone (Aldactone) 50 mg tablet Take 1 tablet (50 mg) by mouth once daily. 90 tablet 4   • SUMAtriptan (Imitrex) 50 mg tablet Take 1 tablet (50 mg) by mouth 1 time if needed for migraine. May repeat dose once in 2 hours if no relief.  Do not exceed 2 doses in 24 hours. 9 tablet 3   • triamcinolone (Kenalog) 0.1 % cream Apply 1 Application topically 2 times a day.     • venlafaxine XR (Effexor XR) 75 mg 24 hr capsule Take 1 capsule (75 mg) by mouth once daily. Do not crush or chew. 90 capsule 4     No current facility-administered medications for this visit.       PAST HISTORIES     PERTINENT PAST  "MEDICAL HISTORY: See HPI    PERTINENT PAST SURGICAL HISTORY for Sleep Medicine:  tonsillectomy and adenoidectomy    PERTINENT FAMILY HISTORY for Sleep Medicine:  sleep apnea- father ()  sleep apnea on PAP- mother    PERTINENT SOCIAL HISTORY:  She  reports that she has never smoked. She has never used smokeless tobacco. She reports current alcohol use. She reports that she does not currently use drugs. Employed as pharmacist.    Active Problems, Allergy List, Medication List, and PMH/PSH/FH/Social Hx have been reviewed and reconciled in chart. No significant changes unless documented in the pertinent chart section. Updates made when necessary.     PHYSICAL EXAM     VITAL SIGNS: /76   Pulse 83   Temp 35.9 °C (96.7 °F)   Resp 16   Ht 1.549 m (5' 1\")   Wt 71.2 kg (157 lb)   SpO2 99%   BMI 29.66 kg/m²     CURRENT WEIGHT:   Vitals:    24 1421   Weight: 71.2 kg (157 lb)      PREVIOUS WEIGHTS:  Wt Readings from Last 3 Encounters:   24 71.2 kg (157 lb)   24 79.1 kg (174 lb 6.1 oz)   24 73 kg (161 lb)     Physical Exam  Constitutional: Awake, not in distress  Skin: Warm, no rash  Neuro: No tremors, moves all extremities  Psych: alert and oriented to time, place, and person    HEENT:   Airway comments: narrow lateral walls   Tongue scalloping: no   Modified Mallampati score - 2-3    RESULTS/DATA     No results found for: \"IRON\", \"TRANSFERRIN\", \"IRONSAT\", \"TIBC\", \"FERRITIN\"    Bicarbonate   Date Value Ref Range Status   2024 28 21 - 32 mmol/L Final       ASSESSMENT/PLAN     Ms. Recio is a 44 y.o. female and She was referred to the [facility] for evaluation of MARYA    Problem List, Orders, Assessment, Recommendations:    # MARYA  - HSAT 2024-showing mild MARYA with AHI 3% 9.4, AHI 4% 6.8, SpO2 uriah 82.2.  SpO2<= 88% for 19 minutes  - Personally reviewed the sleep study's raw data such as interpretation report, data sheet, and hypnogram. " Discussed sleep study results with patient today. A copy of recent testing was either given to patient or released in [employer].   - Will start APAP 5-15 cwp via D[facility]- [facility]  - Sleep apnea, PAP therapy education as well as the tips to be successful with PAP treatment was provided at length in clinic today. Patient verbalized understanding.  - Discussed 30-day mask guarantee and insurance requirement regarding PAP compliance and follow-up.   - Diet, exercise, and weight loss were emphasized today in clinic, as were non-supine sleep, avoiding alcohol in the late evening, and driving or operating heavy machinery when sleepy.   - Patient will follow-up in 2-3 months and bring equipment to the follow-up clinic      #POOR SLEEP HYGIENE / EXCESSIVE DAYTIME SLEEPINESS / FATIGUE + SLEEP DISTURBANCES  - likely due to combination of poor sleep hygiene, depression, anxiety, untreated sleep apnea, chronic pain, and nocturia. Meds may be a contributing factor as well.  - discussed with patient good sleep hygiene  - will reevaluate after successful treatment of MARYA     #DEPRESSION / ANXIETY  - denies any SI, HI or hallucinations   - currently on meds, well controlled  - defer management to PCP / Psychiatry      #Overweight  BMI Readings from Last 1 Encounters:   11/18/24 29.66 kg/m²     - Encouraged healthy weight loss via diet and exercise  - Weight loss can help in the long term treatment of MARYA.  - Defer management to PCP     All of patient's questions were answered. She verbalizes understanding and agreement with my assessment and plan.    Disposition    Return to clinic in 2-3 months      I personally spent 45 minutes today (exclusive of procedures) providing care for this patient, including preparation, face to face time, EMR documentation and other services such as review of medical records, diagnostic results, patient education, counseling, and coordination of care.

## 2024-11-15 PROBLEM — G47.33 OSA (OBSTRUCTIVE SLEEP APNEA): Status: ACTIVE | Noted: 2024-11-15

## 2024-11-15 PROBLEM — E66.9 OBESITY (BMI 30-39.9): Status: ACTIVE | Noted: 2024-11-15

## 2024-11-18 ENCOUNTER — APPOINTMENT (OUTPATIENT)
Dept: SLEEP MEDICINE | Facility: CLINIC | Age: 44
End: 2024-11-18
Payer: COMMERCIAL

## 2024-11-18 VITALS
BODY MASS INDEX: 29.64 KG/M2 | HEART RATE: 83 BPM | TEMPERATURE: 96.7 F | DIASTOLIC BLOOD PRESSURE: 76 MMHG | OXYGEN SATURATION: 99 % | WEIGHT: 157 LBS | HEIGHT: 61 IN | SYSTOLIC BLOOD PRESSURE: 130 MMHG | RESPIRATION RATE: 16 BRPM

## 2024-11-18 DIAGNOSIS — G47.30 SLEEP APNEA, UNSPECIFIED TYPE: ICD-10-CM

## 2024-11-18 DIAGNOSIS — F41.9 ANXIETY AND DEPRESSION: ICD-10-CM

## 2024-11-18 DIAGNOSIS — G47.19 EXCESSIVE DAYTIME SLEEPINESS: ICD-10-CM

## 2024-11-18 DIAGNOSIS — Z78.9 NONSMOKER: ICD-10-CM

## 2024-11-18 DIAGNOSIS — E66.3 OVERWEIGHT (BMI 25.0-29.9): ICD-10-CM

## 2024-11-18 DIAGNOSIS — F32.A ANXIETY AND DEPRESSION: ICD-10-CM

## 2024-11-18 DIAGNOSIS — G47.33 OSA (OBSTRUCTIVE SLEEP APNEA): Primary | ICD-10-CM

## 2024-11-18 PROCEDURE — 99214 OFFICE O/P EST MOD 30 MIN: CPT | Performed by: NURSE PRACTITIONER

## 2024-11-18 PROCEDURE — 1036F TOBACCO NON-USER: CPT | Performed by: NURSE PRACTITIONER

## 2024-11-18 PROCEDURE — 3008F BODY MASS INDEX DOCD: CPT | Performed by: NURSE PRACTITIONER

## 2024-11-18 ASSESSMENT — SLEEP AND FATIGUE QUESTIONNAIRES
DIFFICULTY_STAYING_ASLEEP: MODERATE
SITING INACTIVE IN A PUBLIC PLACE LIKE A CLASS ROOM OR A MOVIE THEATER: HIGH CHANCE OF DOZING
HOW LIKELY ARE YOU TO NOD OFF OR FALL ASLEEP WHILE WATCHING TV: HIGH CHANCE OF DOZING
HOW LIKELY ARE YOU TO NOD OFF OR FALL ASLEEP WHILE SITTING QUIETLY AFTER LUNCH WITHOUT ALCOHOL: MODERATE CHANCE OF DOZING
SLEEP_PROBLEM_INTERFERES_DAILY_ACTIVITIES: SOMEWHAT
ESS-CHAD TOTAL SCORE: 18
SATISFACTION_WITH_CURRENT_SLEEP_PATTERN: DISSATISFIED
SLEEP_PROBLEM_NOTICEABLE_TO_OTHERS: SOMEWHAT
HOW LIKELY ARE YOU TO NOD OFF OR FALL ASLEEP WHILE LYING DOWN TO REST IN THE AFTERNOON WHEN CIRCUMSTANCES PERMIT: HIGH CHANCE OF DOZING
HOW LIKELY ARE YOU TO NOD OFF OR FALL ASLEEP WHILE SITTING AND TALKING TO SOMEONE: MODERATE CHANCE OF DOZING
DIFFICULTY_FALLING_ASLEEP: MILD
WORRIED_DISTRESSED_DUE_TO_SLEEP: SOMEWHAT
HOW LIKELY ARE YOU TO NOD OFF OR FALL ASLEEP WHILE SITTING AND READING: HIGH CHANCE OF DOZING
HOW LIKELY ARE YOU TO NOD OFF OR FALL ASLEEP WHEN YOU ARE A PASSENGER IN A CAR FOR AN HOUR WITHOUT A BREAK: MODERATE CHANCE OF DOZING
HOW LIKELY ARE YOU TO NOD OFF OR FALL ASLEEP IN A CAR, WHILE STOPPED FOR A FEW MINUTES IN TRAFFIC: WOULD NEVER DOZE
WAKING_TOO_EARLY: MILD

## 2024-11-18 NOTE — PATIENT INSTRUCTIONS
[facility] Sleep Medicine   [address] [facility]  [address] [address]       NAME: Shannon Recio   DATE: 11/18/24    Your Sleep Provider Today: MASTER Kelley  Your Primary Care Physician: Lee Ann Sellers MD       DIAGNOSIS:   1. MARYA (obstructive sleep apnea)        2. Obesity (BMI 30-39.9)        3. Sleep apnea, unspecified type  Referral to Adult Sleep Medicine          Thank you for coming to the Sleep Medicine Clinic today! Your sleep medicine provider today was: MASTER Kelley Below is a summary of your treatment plan, other important information, and our contact numbers:      TREATMENT PLAN     - Follow-up in 2-3 months.  - If not already done, sign up for 'My Chart' and send prescription requests or messages through this    Obstructive sleep apnea (MARYA): MARYA is a sleep disorder where your upper airway muscles relax during sleep and the airway intermittently and repetitively narrows and collapses leading to blocked airway (apnea) which, in turn, can disrupt breathing in sleep, lower oxygen levels while you sleep and cause night time wakings. Because apnea may cause higher carbon dioxide or low oxygen levels, untreated MARYA can lead to heart arrhythmia, elevation of blood pressure, and make it harder for the body to consolidate memory and metabolize (leading to higher blood sugars at night).   Frequent partial arousals occur during sleep resulting in sleep deprivation and daytime sleepiness. MARYA is associated with an increased risk of cardiovascular disease, stroke, hypertension, and insulin resistance. Moreover, untreated MARYA with excessive daytime sleepiness can increase the risk of motor vehicular accidents.    Some conservative strategies for MARYA are:   Positional therapy - Avoid sleeping on your back.   Healthy diet, exercise, and optimizing weight encouraged.   Avoid alcohol late in the evening as it can make sleep apnea  "worse.     Safety: Avoid driving and operating heavy equipment while sleepy. Drowsy driving may lead to life-threatening motor vehicle accidents.     Common treatment options for sleep apnea include weight management, positional therapy, Positive Airway Therapy (PAP) therapy, oral appliance therapy, hypoglossal nerve stimulation, and select airway surgeries.    Starting Positive Airway Pressure: You were ordered a device to wear when you sleep called PAP (Positive Airway Pressure) to treat your sleep apnea. The order will be submitted to a durable medical equipment company who will arrange setting you up with the device. They will provide all the necessary equipment and discuss use and maintenance of the device with you.     **Please bring all PAP equipment with you to follow up appointments unless told otherwise.**           Important things to keep in mind as you start PAP    Insurance will monitor your usage during the first 90 days.  You should use your PAP - \"all night, every night\", for your health. The bare minimum is to use your PAP device while sleeping = at least 4 hours per day at least 5 days per week. Otherwise, your PAP device may be reclaimed by your PAP vendor at 90 days.  There are many mask to choose from to wear with your PAP machine. If you are not comfortable with the first mask issued to you, call your DME and ask for another option to try (within the first 30 days).  Discuss with your provider if you are having issues breathing with the machine or the temperature or humidity feel uncomfortable.  Expect to have an adjustment period when you start your device. It helps to continuing wearing the machine every day for a period of time until you get more used to it. You can practice with wearing the mask alone if you need, then add in the PAP air pressure a few days later.   Reach out for help if you are struggling! The sleep medicine department can be reached at [phone]  We encourage you to " download data monitoring apps to your phone. For [employer] AirSense 10/11 - MyAir mir. For [employer] - DreamMapper. Both are available in the Mir store for free and are a great tool to monitor your progress with your CPAP night to night.    IF YOU ARE HAVING TROUBLE GETTING USED TO YOUR PAP DEVICE    The following steps are recommended to help you get accustomed to using CPAP and improve your CPAP usage at home:    Use CPAP every night for 5 to 10 minutes while awake in the evening without fail.  Be sure to remain awake while breathing on the nasal mask for the first 1 to 2 weeks.  After 2 to 4 weeks, start using CPAP at night when you go to sleep…But be sure to take the mask off if you have not fallen asleep in 5 to 10 minutes, or if you fall asleep.  Take the mask off whenever you become aware of it or the moment you wake up.   Continue this process every night, with confidence that you will gradually become accustomed to using CPAP over time.    Be sure you are using a comfortable mask, and that you are applying it snugly (but not too tight).    Common issues with PAP machine:  Mask gets dislodged when turning to the side: Consider getting a CPAP pillow or switching to a mask with hose on top.     Dry mouth:  Your machine has built-in humidifier that heats up the air to prevent dry mouth. It can be adjusted to your comfort. You can try that first and increase setting one level one night at a time to check which setting is comfortable and effective in lessening dry mouth. If dry mouth persists despite humidity setting adjustment, may apply OTC Biotene gel over the gums at bedtime.  If Biotene gel is not effective, consider trying XEROSTOM gel from [employer].  If using nasal pillows or nasal mask, consider adding chinstrap or mouth tape to keep your mouth closed while you sleep. Also, eliminate or reduce dose of meds that can cause dry mouth if possible. Lastly, may try getting a separate room humidifier  "machine.    Airleaks: Please call DME as they may need to adjust your mask or refit you with a different kind of mask. In addition, you can ask DME for tips on getting a good mask seal and mask fit.     Difficulty tolerating the mask: Contact your DME to try a different kind of mask and/or call office to get a referral to Sleep Psychologist for CPAP desensitization. CPAP desensitization technique is a set of strategies that helps patient cope with claustrophobia and anxiety related to wearing mask. Alternatively, we can do a daytime mini-sleep study called PAP-nap trial wherein you will try on different kinds of mask and the sleep technician will try different pressure settings on CPAP and BPAP machines to see which specific pressure is tolerable and comfortable for you.     Water droplets or moisture within the hose and/or mask: This is called rain-out and it is caused by condensation of too much heated humidity on the cooler walls of the hose. If you have rain-out, turn down/decrease your humidity setting or get a heated hose. If you already have a heated hose, turn up the \"tube temperature\" of the heated hose. Alternatively, if you don't want to get a heated hose or warmer air, may wrap the CPAP hose with stockings to keep it somewhat warm. Also, you need to place the machine on the floor and lower the hose so that water won't travel upward towards your mask.     Maintaining your CPAP/BPAP device:    The humidification chamber (aka water tank or water chamber) needs to be filled with distilled water to prevent buildup of white deposits in the future. If you cannot find distilled water, you can use tap water but expect to have white deposits buildup seen after prolonged use with tap water. If you start seeing white deposits on the water chamber, you can clean it by filling it with equal parts of distilled white vinegar and water. Let the vinegar-water mixture sit for 2 hours, and then rinse it with running tap " water. Clean with soap and water then let it dry.     You should try to keep your machine clean in order to work well. Here are some tips to clean PAP supplies / accessories:    Clean the humidification chamber (aka water tank) as well as your mask and tubing at least once a week with soap and water.   Alternatively, you can fill a sink or basin with warm water and add a little mild detergent, like Ivory dish soap. Gently wipe your supplies with the soapy water to free all the oils and dirt that may have collected. Once that's done, rinse these items with clean water until the soap is gone and let them air dry. You can hang your tubing over the curtain luciana in your bathroom so that it dries.  The mask insert (part of the mask that has contact with your skin) needs to be cleaned with soap and water daily. Another option is to wipe them down with CPAP wipes or baby wipes.    You should replace your mask and tubing frequently in order to prevent bacteria buildup, machine damage, and mask seal issues. The older the mask and hose, the high likelihood that there is bacteria buildup in it especially if they are not cleaned regularly. Dirty filters damage machines because build-up of dust and contaminants can cause machine to over-heat, and in time, damage the motor of machine. Cushions lose their seal over time as most masks are made of plastic and silicone while headgear is made of neoprene. These materials will break down with age and frequent use. Here is the recommended replacement schedule for PAP supplies / accessories:    Twice a month- disposable filters and cushions for nasal mask or nasal pillows.  Once a month- cushion for full face mask  Every 3 months- mask with headgear and PAP tubing (standard or heated hose)  Every 6 months- reusable filter, water chamber, and chin strap     Other useful information:    Some people do not put water in the tank while other people prefers to put water in the tank to prevent  mouth dryness. Try to experiment to determine which is more comfortable for you.   In general, new machines have 2 years warranty on parts while health insurance allows you to have a new machine once every 5 years.     You can also go to the following EDUCATION WEBSITES for further information:   American Academy of Sleep Medicine http://sleepeducation.org  National Sleep Foundation: https://sleepfoundation.org  American Sleep Apnea Association: https://www.sleepapnea.org (for patients with sleep apnea)    Here at [facility], we wish you a restful sleep!       IMPORTANT INFORMATION     Call 911 for medical emergencies.  Our offices are generally open from Monday-Friday, 9 am - 5 pm.  If you need to get in touch with me, you may either call me/my team (number is below) or you can use [employer].  If a referral for a test, for CPAP, or for another specialist was made, and you have not heard about scheduling this within a week, please call scheduling at [phone] ([phone]).  If you are unable to make your appointment for clinic or an overnight study, kindly call the office at least 48 hours in advance to cancel and reschedule.  If you are on CPAP, please bring your device's card or the device to each clinic appointment.   There are no supporting services by either the sleep doctors or their staff on weekends and Holidays, or after 5 PM on weekdays.     PRESCRIPTIONS     We require 7 days advanced notice for prescription refills. If we do not receive the request in this time, we cannot guarantee that your medication will be refilled in time.      IMPORTANT PHONE NUMBERS     [facility]: [phone]  [employer] (nvite): [phone]  [employer] (nvite): [phone]  [facility] (nvite): [phone]    CONTACTING YOUR SLEEP MEDICINE PROVIDER AND SLEEP TEAM      For issues with your machine or mask interface, please call your DME provider first. nvite stands for durable  "medical company. DME is the company who provides you the machine and/or PAP supplies / accessories.   To schedule, cancel, or reschedule SLEEP STUDY APPOINTMENTS, please call the Main Phone Line at [phone] - option 3.   To schedule, cancel, or reschedule CLINIC APPOINTMENTS, you can do it in \"TagMiihart\", call [phone] for [facility] office , [phone] for [facility] office to speak with my on site staff, or call the Main Phone Line at [phone] - option 2  For CLINICAL QUESTIONS or MEDICATION REFILLS, please call direct line for Adult Sleep Nurses at [phone].   Lastly, you can also send a message directly to your provider through \"My Chart\", which is the email service through your  Records Account: [email or web address]     Adult Sleep Nurses (Elmira Ordonez, KALEE and Nicole Najera RN):  For clinical questions and refilling prescriptions: [phone]  Email sleep diaries and other documents at: [email or web address]    Office locations for Nelly Marlow NP:    [address]   [address]  [place], [place] [address] [phone]    [address]  [address]  [place], [place] [address] [phone]    [address]  [address]  [place], [place] [address] [phone]    OUR SLEEP TESTING LOCATIONS     Our team will contact you to schedule your sleep study, however, you can contact us as follow:  Main Phone Line (scheduling only): [phone]), option 3    Sleep Testing Locations:   [place] (18 years and older): [address] [place] [address], 2nd floor   [place] (18 years and older): [address] [facility]; 4th floor  After hours line: [phone]  [facility] (18 years and older) at [place]: [address] [facility]  After hours line: [phone]   [facility] (Main campus: All ages): [facility], 6th floor. After hours line: [phone]  [facility] (5 years and older; younger considered on case-by-case " basis): [address]; [employer] [address], [address]. Scheduling  After hours line: [phone]       Here at [facility], we wish you a restful sleep!    Your sleep medicine provider for this visit was: TAB Kelley-CNP

## 2024-11-22 ENCOUNTER — APPOINTMENT (OUTPATIENT)
Dept: INTEGRATIVE MEDICINE | Facility: CLINIC | Age: 44
End: 2024-11-22
Payer: COMMERCIAL

## 2024-11-22 DIAGNOSIS — M54.9 BACK PAIN: Primary | ICD-10-CM

## 2024-11-22 DIAGNOSIS — R53.83 FATIGUE: ICD-10-CM

## 2024-11-22 PROCEDURE — ACUGR GROUP ACUPUNCTURE

## 2024-11-22 NOTE — PROGRESS NOTES
"Acupuncture Visit:     Subjective   Patient ID: Shannon Recio is a 44 y.o. female who presents for Fatigue and Back Pain (Upper neck, shoulders pain)  Tight neck and shoulder, Gb21 and Bl10 area  \"Busy stress\"    Tired all the time, goes to bed late, sometimes wakes in the night    Going through a divorce and sisters cancer dx  Sadness, \"Heart hurts\"        Session Information  Is this acupuncture treatment being billed to the patient's insurance company: No  Visit Type: Follow-up visit  Medical History Reviewed: I have reviewed pertinent medical history in EHR, and no contraindications are present to provide treatment         Review of Systems         Provider reviewed plan for the acupuncture session, precautions and contraindications. Patient/guardian/hospital staff has given consent to treat with full understanding of what to expect during the session. Before acupuncture began, provider explained to the patient to communicate at any time if the procedure was causing discomfort past their tolerance level. Patient agreed to advise acupuncturist. The acupuncturist counseled the patient on the risks of acupuncture treatment including pain, infection, bleeding, and no relief of pain. The patient was positioned comfortably. There was no evidence of infection at the site of needle insertions.    Objective   Physical Exam    Acupuncture Physical Exam  Tongue Color: Red body  Tongue Shape: Indented tip    Treatment Plan  Treatment Goals: Anxiety reduction, Fatigue reduction, Wellbeing improvement, Pain management    Acupuncture Treatment  Patient Position: Seated  Acupuncture Needling: Yes  Needle Guage: 42 guage /.14/ Lime green seirin  Body Points: With retention  Body Points - Left: St44, Pc6, Ki9  Body Points - Bilateral: Bl2, Gb21, Ren22  Body Points - Right: Gb34  Auricular Points: No  Acupuncture Treatment Change: No changes  Needle Count In: 9  Needle Count Out: 9  Needle Retention Time (min): 25 " minutes  Total Face to Face Time (min): 15 minutes              Assessment/Plan

## 2024-12-06 ENCOUNTER — APPOINTMENT (OUTPATIENT)
Dept: INTEGRATIVE MEDICINE | Facility: CLINIC | Age: 44
End: 2024-12-06
Payer: COMMERCIAL

## 2024-12-06 DIAGNOSIS — R52 PAIN MANAGEMENT: Primary | ICD-10-CM

## 2024-12-06 PROCEDURE — ACUGR GROUP ACUPUNCTURE

## 2024-12-06 NOTE — PROGRESS NOTES
Acupuncture Visit:     Subjective   Patient ID: Shannon Recio is a 44 y.o. female who presents for lower body pain  Knee and ankle pain from moving     Overall stress                  Review of Systems         Provider reviewed plan for the acupuncture session, precautions and contraindications. Patient/guardian/hospital staff has given consent to treat with full understanding of what to expect during the session. Before acupuncture began, provider explained to the patient to communicate at any time if the procedure was causing discomfort past their tolerance level. Patient agreed to advise acupuncturist. The acupuncturist counseled the patient on the risks of acupuncture treatment including pain, infection, bleeding, and no relief of pain. The patient was positioned comfortably. There was no evidence of infection at the site of needle insertions.    Objective   Physical Exam              Acupuncture Treatment  Patient Position: Seated and reclining  Acupuncture Needling: Yes  Needle Guage: 42 guage /.14/ Lime green seirin  Body Points: With retention  Body Points - Left: Lr13, Lr3  Body Points - Bilateral: Ren22  Body Points - Right: Gb34, Gb44  Auricular Points: No  Acupuncture Treatment Change: No changes  Electroacupuncture Used: No  Needle Count In: 5  Needle Count Out: 5  Needle Retention Time (min): 30 minutes  Total Face to Face Time (min): 5 minutes              Assessment/Plan

## 2024-12-16 ENCOUNTER — APPOINTMENT (OUTPATIENT)
Dept: SURGICAL ONCOLOGY | Facility: HOSPITAL | Age: 44
End: 2024-12-16
Payer: COMMERCIAL

## 2025-01-07 ENCOUNTER — APPOINTMENT (OUTPATIENT)
Dept: NEUROLOGY | Facility: CLINIC | Age: 45
End: 2025-01-07
Payer: COMMERCIAL

## 2025-01-13 ENCOUNTER — OFFICE VISIT (OUTPATIENT)
Dept: HEMATOLOGY/ONCOLOGY | Facility: CLINIC | Age: 45
End: 2025-01-13
Payer: COMMERCIAL

## 2025-01-13 ENCOUNTER — LAB (OUTPATIENT)
Dept: LAB | Facility: CLINIC | Age: 45
End: 2025-01-13
Payer: COMMERCIAL

## 2025-01-13 ENCOUNTER — INFUSION (OUTPATIENT)
Dept: HEMATOLOGY/ONCOLOGY | Facility: CLINIC | Age: 45
End: 2025-01-13
Payer: COMMERCIAL

## 2025-01-13 VITALS
SYSTOLIC BLOOD PRESSURE: 109 MMHG | BODY MASS INDEX: 30.28 KG/M2 | TEMPERATURE: 97.5 F | DIASTOLIC BLOOD PRESSURE: 73 MMHG | HEART RATE: 102 BPM | RESPIRATION RATE: 16 BRPM | OXYGEN SATURATION: 95 % | WEIGHT: 160.27 LBS

## 2025-01-13 DIAGNOSIS — Z17.0 MALIGNANT NEOPLASM OF OVERLAPPING SITES OF LEFT BREAST IN FEMALE, ESTROGEN RECEPTOR POSITIVE: ICD-10-CM

## 2025-01-13 DIAGNOSIS — C50.812 MALIGNANT NEOPLASM OF OVERLAPPING SITES OF LEFT BREAST IN FEMALE, ESTROGEN RECEPTOR POSITIVE: ICD-10-CM

## 2025-01-13 DIAGNOSIS — Z17.0 MALIGNANT NEOPLASM OF OVERLAPPING SITES OF LEFT BREAST IN FEMALE, ESTROGEN RECEPTOR POSITIVE: Primary | ICD-10-CM

## 2025-01-13 DIAGNOSIS — C50.812 MALIGNANT NEOPLASM OF OVERLAPPING SITES OF LEFT BREAST IN FEMALE, ESTROGEN RECEPTOR POSITIVE: Primary | ICD-10-CM

## 2025-01-13 LAB
ALBUMIN SERPL BCP-MCNC: 4.4 G/DL (ref 3.4–5)
ALP SERPL-CCNC: 56 U/L (ref 33–110)
ALT SERPL W P-5'-P-CCNC: 55 U/L (ref 7–45)
ANION GAP SERPL CALC-SCNC: 10 MMOL/L
AST SERPL W P-5'-P-CCNC: 30 U/L (ref 9–39)
BASOPHILS # BLD AUTO: 0.02 X10*3/UL (ref 0–0.1)
BASOPHILS NFR BLD AUTO: 0.9 %
BILIRUB SERPL-MCNC: 0.4 MG/DL (ref 0–1.2)
BUN SERPL-MCNC: 22 MG/DL (ref 6–23)
CALCIUM SERPL-MCNC: 9.1 MG/DL (ref 8.6–10.3)
CHLORIDE SERPL-SCNC: 104 MMOL/L (ref 98–107)
CO2 SERPL-SCNC: 28 MMOL/L (ref 21–32)
CREAT SERPL-MCNC: 0.76 MG/DL (ref 0.5–1.05)
EGFRCR SERPLBLD CKD-EPI 2021: >90 ML/MIN/1.73M*2
EOSINOPHIL # BLD AUTO: 0.03 X10*3/UL (ref 0–0.7)
EOSINOPHIL NFR BLD AUTO: 1.4 %
ERYTHROCYTE [DISTWIDTH] IN BLOOD BY AUTOMATED COUNT: 12.3 % (ref 11.5–14.5)
GLUCOSE SERPL-MCNC: 106 MG/DL (ref 74–99)
HCT VFR BLD AUTO: 33.1 % (ref 36–46)
HGB BLD-MCNC: 11.7 G/DL (ref 12–16)
IMM GRANULOCYTES # BLD AUTO: 0 X10*3/UL (ref 0–0.7)
IMM GRANULOCYTES NFR BLD AUTO: 0 % (ref 0–0.9)
LYMPHOCYTES # BLD AUTO: 0.54 X10*3/UL (ref 1.2–4.8)
LYMPHOCYTES NFR BLD AUTO: 25.2 %
MAGNESIUM SERPL-MCNC: 2.1 MG/DL (ref 1.6–2.4)
MCH RBC QN AUTO: 36 PG (ref 26–34)
MCHC RBC AUTO-ENTMCNC: 35.3 G/DL (ref 32–36)
MCV RBC AUTO: 102 FL (ref 80–100)
MONOCYTES # BLD AUTO: 0.2 X10*3/UL (ref 0.1–1)
MONOCYTES NFR BLD AUTO: 9.3 %
NEUTROPHILS # BLD AUTO: 1.35 X10*3/UL (ref 1.2–7.7)
NEUTROPHILS NFR BLD AUTO: 63.2 %
PHOSPHATE SERPL-MCNC: 3.7 MG/DL (ref 2.5–4.9)
PLATELET # BLD AUTO: 178 X10*3/UL (ref 150–450)
POTASSIUM SERPL-SCNC: 4.4 MMOL/L (ref 3.5–5.3)
PROT SERPL-MCNC: 6.9 G/DL (ref 6.4–8.2)
RBC # BLD AUTO: 3.25 X10*6/UL (ref 4–5.2)
SODIUM SERPL-SCNC: 138 MMOL/L (ref 136–145)
WBC # BLD AUTO: 2.1 X10*3/UL (ref 4.4–11.3)

## 2025-01-13 PROCEDURE — 99214 OFFICE O/P EST MOD 30 MIN: CPT

## 2025-01-13 PROCEDURE — 85025 COMPLETE CBC W/AUTO DIFF WBC: CPT

## 2025-01-13 PROCEDURE — 2500000004 HC RX 250 GENERAL PHARMACY W/ HCPCS (ALT 636 FOR OP/ED): Performed by: INTERNAL MEDICINE

## 2025-01-13 PROCEDURE — 36415 COLL VENOUS BLD VENIPUNCTURE: CPT

## 2025-01-13 PROCEDURE — 96365 THER/PROPH/DIAG IV INF INIT: CPT | Mod: INF

## 2025-01-13 PROCEDURE — 84100 ASSAY OF PHOSPHORUS: CPT

## 2025-01-13 PROCEDURE — 80053 COMPREHEN METABOLIC PANEL: CPT

## 2025-01-13 PROCEDURE — 83735 ASSAY OF MAGNESIUM: CPT

## 2025-01-13 RX ORDER — FAMOTIDINE 10 MG/ML
20 INJECTION INTRAVENOUS ONCE AS NEEDED
OUTPATIENT
Start: 2025-06-30

## 2025-01-13 RX ORDER — HEPARIN SODIUM,PORCINE/PF 10 UNIT/ML
50 SYRINGE (ML) INTRAVENOUS AS NEEDED
OUTPATIENT
Start: 2025-01-13

## 2025-01-13 RX ORDER — DIPHENHYDRAMINE HYDROCHLORIDE 50 MG/ML
50 INJECTION INTRAMUSCULAR; INTRAVENOUS AS NEEDED
OUTPATIENT
Start: 2025-06-30

## 2025-01-13 RX ORDER — EPINEPHRINE 0.3 MG/.3ML
0.3 INJECTION SUBCUTANEOUS EVERY 5 MIN PRN
OUTPATIENT
Start: 2025-06-30

## 2025-01-13 RX ORDER — HEPARIN 100 UNIT/ML
500 SYRINGE INTRAVENOUS AS NEEDED
OUTPATIENT
Start: 2025-01-13

## 2025-01-13 RX ORDER — ZOLEDRONIC ACID 0.04 MG/ML
4 INJECTION, SOLUTION INTRAVENOUS ONCE
OUTPATIENT
Start: 2025-06-30

## 2025-01-13 RX ORDER — ZOLEDRONIC ACID 0.04 MG/ML
4 INJECTION, SOLUTION INTRAVENOUS ONCE
Status: COMPLETED | OUTPATIENT
Start: 2025-01-13 | End: 2025-01-13

## 2025-01-13 RX ORDER — ALBUTEROL SULFATE 0.83 MG/ML
3 SOLUTION RESPIRATORY (INHALATION) AS NEEDED
OUTPATIENT
Start: 2025-06-30

## 2025-01-13 RX ADMIN — ZOLEDRONIC ACID 4 MG: 0.04 INJECTION, SOLUTION INTRAVENOUS at 10:45

## 2025-01-13 ASSESSMENT — PAIN SCALES - GENERAL: PAINLEVEL_OUTOF10: 0-NO PAIN

## 2025-01-13 NOTE — PROGRESS NOTES
Patient ID: Shannon Recio is a 44 y.o. female.  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 Shannon Recio is a 45 y/o F who presents for follow-up for left breast cancer.     Patient presents for follow up visit. She reports she started her 7 days off today. She has taken the lowered dose of palbocilib 100 mg daily x21 days for one cycle, she had a few cycles of the higher dose at home she wanted to use up. She reports some improvement in fatigue but still feels like crashing at the end of the day but is able to keep up with ADLs and her work. She has been using a CPAP for about a month for MARYA. She uses ducolax for constipation and associates this with not drinking enough water. She reports eating a balanced diet, enjoys sweets. Overall reports doing well with no new or worsening symptoms. She continues to take Exemestane daily. Today, she is due for her 4th dose of Zometa IV.     Patient denies pain, SOB, cough, chest pain, palpitations, edema, fevers, chills, nausea, vomiting, diarrhea, skin changes or other concerns.     Patient's past medical history, surgical history, family history and social history reviewed.    Objective    Visit Vitals  /73   Pulse 102   Temp 36.4 °C (97.5 °F)   Resp 16   Wt 72.7 kg (160 lb 4.4 oz)   SpO2 95%   BMI 30.28 kg/m²   Smoking Status Never   BSA 1.77 m²     Review of Systems:   Review of Systems:    Positive per HPI, otherwise negative.      Physical Exam  Gen: appears well in clinic, NAD  HEENT: atraumatic head, normocephalic, EOMI, conjunctiva normal  LUNG: no increased WOB, CTAB  CV: No JVD. RRR  GI: soft, NT, ND  LE: no LE edema  Skin: no  obvious rashes or lesions on visible skin  Neuro: interactive, no focal deficits noted  Psych: normal mood and affect    Performance Status:  Symptomatic; fully ambulatory    Labs/Imaging/Pathology: personally reviewed reports and images in Epic electronic medical record system. Pertinent results as it related to the plan represented in below in assessment and plan.     Labs:  Lab Results   Component Value Date    WBC 2.1 (L) 01/13/2025    NEUTROABS 1.35 01/13/2025    IGABSOL 0.00 01/13/2025    LYMPHSABS 0.54 (L) 01/13/2025    MONOSABS 0.20 01/13/2025    EOSABS 0.03 01/13/2025    BASOSABS 0.02 01/13/2025    RBC 3.25 (L) 01/13/2025     (H) 01/13/2025    MCHC 35.3 01/13/2025    HGB 11.7 (L) 01/13/2025    HCT 33.1 (L) 01/13/2025     01/13/2025     Lab Results   Component Value Date    CREATININE 0.76 01/13/2025    BUN 22 01/13/2025    EGFR >90 01/13/2025     01/13/2025    K 4.4 01/13/2025     01/13/2025    CO2 28 01/13/2025      Lab Results   Component Value Date    ALT 55 (H) 01/13/2025    AST 30 01/13/2025    ALKPHOS 56 01/13/2025    BILITOT 0.4 01/13/2025          Assessment/Plan   1. Left breast IDC, stage IIB [id number], ER>95% SD>95% , HER2 neg, low risk mammaprint  2. Biopsy proven metastases to liver 8/1/22, hormone positive     - initially palpated a left breast mass with nipple changes in 3/21/2021 and she was referred to Dr. Waterman - diagnostic mammogram with tomosynthesis on 3/30/2022 and there was a mass highly suspicious of malignancy in the left breast. No findings in  the right breast. She underwent subsequently a ultrasound on the same day and she was found to have an irregular spiculated hypoechoic shadowing mass measuring 1.9 x 1.6 x 2.9 cm at 1 o'clock, 3 cm from the nipple. There were 8 lymph nodes noted in the  left axilla with 2-3 lymph nodes of intermediate suspicion of involvement  - core needle biopsy on 4/11/2022 and final pathology shows invasive ductal carcinoma,  grade 2, in DCIS in the breast mass. The most suspicious lymph node was also biopsied and negative for carcinoma  - 5/13/22 bilateral mastectomy. Left breast showed invasive ductal carcinoma, grade 3 with DCIS. Final tumor size was 3.4 cm. Grade 3 with no extensive intraductal component. There was indeterminant left LVI. DCIS was present. All margins were negative.  There was also macrometastatic carcinoma in 2/4 lymph nodes. End stage N1a. Given stage 2 disease, she did not have staging scans completed  - initially discussed with her low risk mammaprint despite higher clinical risk with node involvement, ultimately decided to not pursue adjuvant chemotherapy   - during CT sim for RT planning, found to have concerning breast lesion  - Liver biopsy 8/1/22 consistent with primary breast cancer  - today discussed new diagnosis of metastatic disease and overall approach to therapy and prognosis  - Reviewed first line therapy with CDK 4/6 inhibitor plus AI inhibitor  - Regarding choice of CDK4/6 inhibitor, given liver involvement I discussed with patient that although there is some more longer survival data with ribociclib, I would be concerned about risk of hepatotoxicity as if there is no other distant disease we  could consider treatment of oligometastatic disease  - 8/2022- started Ibrance 125 mg PO daily D1-21 every 28 days with letrozole 2.5 mg PO daily (changed from letrozole to exemestane 25 mg in August 2023 due to arthralgias)   - bilateral salpingo-oophorectomy on 10/10/22  - Signatera from 11/7 0.07 MTM/mL  - complete 50 Gy in 25 fractions to her regional nodes and chest wall along with SBRT completed on 5/22/2023. She also underwent SBRT to the liver metastases from 4/4 to 4/11/2023, for a total of 54 Gy in 3 fractions.   - PET CT from 1/23/23, which shows there is no clear evidence of disease.   -  scans from 7/20/23. No new evidence of recurrence.   - imaging from 12/11/23 shows no new evidence of  recurrence in the bone, chest, abdomen or pelvis.   - CT chest, for rib pain, from 2/26/24 is unremarkable.   - 6/3/24: Added spironolactone 50 mg daily, which we can increase to 100 mg if she tolerates it for hair loss  - 9/5/24: Recent PET CT yesterday shows no FGD avid disease. Patient is now 2 years out. Discussed there is really no data to stop treatment. Due to some fatigue we discussed we will plan to cut back Ibrance to 100 mg to see if this helps with the fatigue.  - Will plan to continue Zometa for 3-5 years every 6 months.  - Next CT c/a/p and bone scan in 6 months.  - In the future we can try and get her scans and Zometa on schedule together.  - She has received zometa on 8/21/2023, 2/12/24, 7/29/24,       1/13/2025:  - Patient presents for follow up   - Patient continues on palbociclib 100 mg x21 days on followed by 7 days off and exemestane 25 mg daily   - She has only taken 1 cycle of the lowered dose palbociclib, feels some improvement in energy but also now sleeping with CPAP  - Labs today were reviewed, parameters met for her to start next cycle of oral chemotherapy in 7 days  - She was advised to continue on Exemestane   - 4th dose Zometa today   - Patient will return as already scheduled in March with repeat CT c/a/p and bone scan (labs before scans including Signatera), and follow up with Dr. Kumar to review all results       Reviewed ongoing medical problems and how they relate to her breast cancer, will continue long term monitoring.    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

## 2025-01-14 NOTE — PROGRESS NOTES
Patient: Shannon Recio  : 1980 AGE: 44 y.o. SEX:female   MRN: [id number]   Provider: TAB Kelley-CNP     Location [facility]   Service Date: 2025     PCP: Lee Ann Sellers MD   Referred by: No ref. provider found          [facility]  Follow Up Visit Note      HISTORY OF PRESENT ILLNESS     Shannon Recio is a 44 y.o. female with a h/o MARYA and metastic breast cancer, anxiety, migraines, vitamin D deficiency, obesity  who presents to [facility].    25: First follow up since starting PAP therapy.  Doing well with PAP, using every night with positive benefit.  Comfortable with current pressure and P10 mask fit.  Denies any bothersome symptoms or air leak.  Cleaning equipment regularly.  PAP Adherence:  Mask: MASK TYPE: P10  Benefits with PAP: PERCEIVED BENEFITS OF PAP: reduced daytime sleepiness decreased or no snoring           2024: NPV with concerns of MARYA management.  Patient had HSAT 2024 showing mild MARYA but no CPAP started yet.  Testing was completed due to loud snoring, occasional gasping, unrefreshed sleep, and EDS. Would like to try CPAP. ----> APAP 5-15 via [facility]       SLEEP STUDY HISTORY (personally reviewed raw data such as interpretation report, data sheet, hypnogram, and titration table if available and applicable)  -HSAT 2024-showing mild MARYA with AHI 3% 9.4, AHI 4% 6.8, SpO2 uriah 82.2.  SpO2<= 88% for 19 minutes    SLEEP-WAKE SCHEDULE    Sleep Patterns: In terms of the patient's sleep/wake cycle, she generally gets into bed at approximately midnight.  her latency to sleep onset after lights out is quick. During the night, the patient generally awakens 0-1 times nightly. These awakenings are usually prolonged in duration. Final wake time on weekday mornings is around 6-6:30 AM. TST 6 hours/night. Naps couple times a week for 60-90min which is refreshing.     Compared to  weekdays, the patient's sleep schedule is  similar on the weekends. Bed time 1AM, wake time 10AM.     Breathing during sleep: snoring, witnessed apneas, gasping/choking for air, and night sweats  Behaviors at night: No   Sleep paralysis: No   Hypnogogic or hypnopompic hallucinations: No   Cataplexy: No     Leg symptoms and timing:  - Sensations: Patient does not have unusual sensations in their extremities that cause an urge to move them   - Movement: Patient has not been told that their legs kick or jerk during sleep    Daytime Symptoms:  On awakening patient reports: wake unrefreshed, feels sleepy, morning headaches, morning dry mouth, and hard to get out of bed  Patient report some daytime symptoms including: DAYTIME SYMPTOMS: reports excessive daytime sleepiness sleep inertia late to work/school due to sleepiness irritability during the day feeling sleepy when driving    Sleep environment:  Preferred sleep position: back and side  Room is dark: Yes  Room is quiet: Yes  Room is cool: Yes  Bed comfort: good    SLEEP HABITS  Caffeine consumption: Yes, 1-2 cups/day  Alcohol consumption: Yes, rarely (occasional)  Smoking: No  Marijuana: No  Sleep aids: hydroxyzine or benadryl or melatonin rarely 1x/month or less    WEIGHT: gained 20lbs in 1 year     ESS: 12    REVIEW OF SYSTEMS     All other systems have been reviewed and are negative.    ALLERGIES     Allergies   Allergen Reactions    Bactrim [Sulfamethoxazole-Trimethoprim] Hives    Sulfa (Sulfonamide Antibiotics) Unknown    Ammonium Lactate Rash    Pseudoephedrine Palpitations       MEDICATIONS     Current Outpatient Medications   Medication Sig Dispense Refill    American ginseng root (AMERICAN GINSENG ORAL) Take by mouth.      ashwagandha extract 120 mg capsule Take by mouth.      busPIRone (Buspar) 15 mg tablet Take 1 tablet (15 mg) by mouth 2 times a day. 180 tablet 3    C/sourcherry/celery/grape seed (TART CHERRY ORAL) Take 1 tablet by mouth once daily (M-F).       calcium carbonate (CALCIUM 600 ORAL) Take 1 tablet by mouth once daily.      cholecalciferol (Vitamin D3) 50 mcg (2,000 unit) capsule Take 1 capsule (50 mcg) by mouth once daily.      ciclopirox 1 % shampoo Apply 1 Application topically once daily. To wet scalp; leave in for 5-10 minutes then as needed for flares 120 mL 11    clindamycin (Cleocin T) 1 % gel Apply topically once daily. 60 g 11    docusate sodium (Colace) 100 mg capsule Take 1 capsule (100 mg) by mouth once daily.      exemestane (Aromasin) 25 mg tablet Take 1 tablet (25 mg total) by mouth once daily.  Take after a meal.  Try to take at the same time each day. 90 tablet 3    GENERIC EXTERNAL MEDICATION Take 1 tablet by mouth once daily. Probiotic/prebiotic      GENERIC EXTERNAL MEDICATION Take 1 tablet by mouth once daily. Tumeric      glucosamine-chondroitin 500-400 mg tablet Take 1 tablet by mouth once daily.      hydrOXYzine HCL (Atarax) 25 mg tablet Take 1 tablet (25 mg) by mouth every 4 hours if needed for itching. 30 tablet 3    lactase (Lactaid) 3,000 unit tablet Take 1 tablet (3,000 Units) by mouth if needed.      MULTIVIT-MIN-FERROUS FUMARATE ORAL Take by mouth once daily.      naproxen sodium (Anaprox) 550 mg tablet Take 1 tablet (550 mg) by mouth if needed.      palbociclib (Ibrance) 100 mg tablet Take 1 tablet (100 mg) by mouth once daily for 21 days, then off for 7 days 21 tablet 5    [START ON 1/21/2025] palbociclib (Ibrance) 100 mg tablet Take 1 tablet (100 mg) by mouth once daily for 21 days, then off for 7 days 21 tablet 5    spironolactone (Aldactone) 50 mg tablet Take 1 tablet (50 mg) by mouth once daily. 90 tablet 4    SUMAtriptan (Imitrex) 50 mg tablet Take 1 tablet (50 mg) by mouth 1 time if needed for migraine. May repeat dose once in 2 hours if no relief.  Do not exceed 2 doses in 24 hours. 9 tablet 3    triamcinolone (Kenalog) 0.1 % cream Apply 1 Application topically 2 times a day.      venlafaxine XR (Effexor XR) 75 mg  "24 hr capsule Take 1 capsule (75 mg) by mouth once daily. Do not crush or chew. 90 capsule 4     No current facility-administered medications for this visit.       PAST HISTORIES     PERTINENT PAST MEDICAL HISTORY: See HPI    PERTINENT PAST SURGICAL HISTORY for Sleep Medicine:  tonsillectomy and adenoidectomy    PERTINENT FAMILY HISTORY for Sleep Medicine:  sleep apnea- father ()  sleep apnea on PAP- mother    PERTINENT SOCIAL HISTORY:  She  reports that she has never smoked. She has never used smokeless tobacco. She reports current alcohol use. She reports that she does not currently use drugs. Employed as pharmacist.    Active Problems, Allergy List, Medication List, and PMH/PSH/FH/Social Hx have been reviewed and reconciled in chart. No significant changes unless documented in the pertinent chart section. Updates made when necessary.     PHYSICAL EXAM     VITAL SIGNS: /69   Pulse 108   Temp 35.9 °C (96.6 °F)   Resp 16   Ht 1.549 m (5' 1\")   Wt 72.6 kg (160 lb)   SpO2 95%   BMI 30.23 kg/m²     CURRENT WEIGHT:   Vitals:    25 1509   Weight: 72.6 kg (160 lb)     PREVIOUS WEIGHTS:  Wt Readings from Last 3 Encounters:   25 72.6 kg (160 lb)   25 72.7 kg (160 lb 4.4 oz)   24 71.2 kg (157 lb)     Constitutional: Alert and oriented, cooperative, no obvious distress   HEENT: Non icteric or anemic, EOM WNL bilaterally   Neck: Supple, no JVD, no goiter, no adenopathy, no rigidity     RESULTS/DATA     No results found for: \"IRON\", \"TRANSFERRIN\", \"IRONSAT\", \"TIBC\", \"FERRITIN\"    Bicarbonate   Date Value Ref Range Status   2025 28 21 - 32 mmol/L Final       ASSESSMENT/PLAN     Ms. Recio is a 44 y.o. female and She was referred to the [facility] for evaluation of MARYA    Problem List, Orders, Assessment, Recommendations:    # MARYA  - HSAT 2024-showing mild MARYA with AHI 3% 9.4, AHI 4% 6.8, SpO2 uriah 82.2.  SpO2<= 88% for 19 minutes  - " Retrieved and personally reviewed recent PAP adherence download data today. See HPI.  - excellent compliance to PAP therapy, residual AHI at goal, and good control of MARYA symptoms  - continue current setting 5-15 CWP  - renew PAP supply orders, order placed to DME- [facility]  - diet, exercise, and weight loss were emphasized today in clinic, as were non-supine sleep, avoiding alcohol in the late evening, and driving or operating heavy machinery when sleepy. Patient verbalized understanding.     #POOR SLEEP HYGIENE / EXCESSIVE DAYTIME SLEEPINESS / FATIGUE + SLEEP DISTURBANCES  - Improving with PAP     #DEPRESSION / ANXIETY  - denies any SI, HI or hallucinations   - currently on meds, well controlled  - defer management to PCP / Psychiatry      #Obesity  BMI Readings from Last 1 Encounters:   01/20/25 30.23 kg/m²     - Encouraged healthy weight loss via diet and exercise  - Weight loss can help in the long term treatment of MARYA.  - Defer management to PCP     All of patient's questions were answered. She verbalizes understanding and agreement with my assessment and plan.    Disposition    Return to clinic in 12 months

## 2025-01-15 DIAGNOSIS — Z17.0 MALIGNANT NEOPLASM OF OVERLAPPING SITES OF LEFT BREAST IN FEMALE, ESTROGEN RECEPTOR POSITIVE: Primary | ICD-10-CM

## 2025-01-15 DIAGNOSIS — C50.812 MALIGNANT NEOPLASM OF OVERLAPPING SITES OF LEFT BREAST IN FEMALE, ESTROGEN RECEPTOR POSITIVE: Primary | ICD-10-CM

## 2025-01-15 NOTE — TELEPHONE ENCOUNTER
Shannon Recoi is a 44 y.o. year old female patient who had a patient care encounter with Luis Kohli CNP on 1/13/25 for ongoing management of Malignant neoplasm of overlapping sites of left breast in female, estrogen receptor positive [C50.812, Z17.0] .  Abby is currently being treated with palbociclib + exemestane.    Results from last 7 days   Lab Units 01/13/25  1021   WBC AUTO x10*3/uL 2.1*   HEMOGLOBIN g/dL 11.7*   HEMATOCRIT % 33.1*   PLATELETS AUTO x10*3/uL 178   NEUTROS PCT AUTO % 63.2   LYMPHS PCT AUTO % 25.2   MONOS PCT AUTO % 9.3   EOS PCT AUTO % 1.4      Results from last 7 days   Lab Units 01/13/25  0810   SODIUM mmol/L 138   POTASSIUM mmol/L 4.4   CHLORIDE mmol/L 104   CO2 mmol/L 28   BUN mg/dL 22   CREATININE mg/dL 0.76   CALCIUM mg/dL 9.1   PROTEIN TOTAL g/dL 6.9   BILIRUBIN TOTAL mg/dL 0.4   ALK PHOS U/L 56   ALT U/L 55*   AST U/L 30   GLUCOSE mg/dL 106*     Some improvement in energy with palbo 100 mg dose.  Continue current therapy.    Per collaborative practice agreement with Dr. Kumar, on 1/15/25 I refilled palbociclib 100 mg PO once daily on D1 - 21 of a 28-day cycle.  Qty # 21 with 5 refills.  Due to recent insurance coverage changes, the prescription was sent to [id number].    Next FUV is 3/7/25.      Jose Ocampo [facility], MS, BCOP  Clinical Pharmacist Specialist - Ambulatory Oncology

## 2025-01-16 ENCOUNTER — APPOINTMENT (OUTPATIENT)
Dept: NEUROLOGY | Facility: CLINIC | Age: 45
End: 2025-01-16
Payer: COMMERCIAL

## 2025-01-20 ENCOUNTER — OFFICE VISIT (OUTPATIENT)
Facility: CLINIC | Age: 45
End: 2025-01-20
Payer: COMMERCIAL

## 2025-01-20 ENCOUNTER — APPOINTMENT (OUTPATIENT)
Dept: INTEGRATIVE MEDICINE | Facility: CLINIC | Age: 45
End: 2025-01-20
Payer: COMMERCIAL

## 2025-01-20 VITALS
OXYGEN SATURATION: 95 % | BODY MASS INDEX: 30.21 KG/M2 | TEMPERATURE: 96.6 F | HEART RATE: 108 BPM | DIASTOLIC BLOOD PRESSURE: 69 MMHG | WEIGHT: 160 LBS | RESPIRATION RATE: 16 BRPM | SYSTOLIC BLOOD PRESSURE: 130 MMHG | HEIGHT: 61 IN

## 2025-01-20 DIAGNOSIS — G47.19 EXCESSIVE DAYTIME SLEEPINESS: ICD-10-CM

## 2025-01-20 DIAGNOSIS — G47.33 OSA (OBSTRUCTIVE SLEEP APNEA): Primary | ICD-10-CM

## 2025-01-20 DIAGNOSIS — Z78.9 NONSMOKER: ICD-10-CM

## 2025-01-20 DIAGNOSIS — E66.811 OBESITY (BMI 30.0-34.9): ICD-10-CM

## 2025-01-20 PROCEDURE — 99214 OFFICE O/P EST MOD 30 MIN: CPT | Performed by: NURSE PRACTITIONER

## 2025-01-20 PROCEDURE — 1036F TOBACCO NON-USER: CPT | Performed by: NURSE PRACTITIONER

## 2025-01-20 PROCEDURE — 3008F BODY MASS INDEX DOCD: CPT | Performed by: NURSE PRACTITIONER

## 2025-01-20 ASSESSMENT — SLEEP AND FATIGUE QUESTIONNAIRES
ESS-CHAD TOTAL SCORE: 12
HOW LIKELY ARE YOU TO NOD OFF OR FALL ASLEEP WHILE LYING DOWN TO REST IN THE AFTERNOON WHEN CIRCUMSTANCES PERMIT: MODERATE CHANCE OF DOZING
HOW LIKELY ARE YOU TO NOD OFF OR FALL ASLEEP IN A CAR, WHILE STOPPED FOR A FEW MINUTES IN TRAFFIC: WOULD NEVER DOZE
SITING INACTIVE IN A PUBLIC PLACE LIKE A CLASS ROOM OR A MOVIE THEATER: MODERATE CHANCE OF DOZING
HOW LIKELY ARE YOU TO NOD OFF OR FALL ASLEEP WHILE SITTING QUIETLY AFTER LUNCH WITHOUT ALCOHOL: SLIGHT CHANCE OF DOZING
HOW LIKELY ARE YOU TO NOD OFF OR FALL ASLEEP WHEN YOU ARE A PASSENGER IN A CAR FOR AN HOUR WITHOUT A BREAK: SLIGHT CHANCE OF DOZING
HOW LIKELY ARE YOU TO NOD OFF OR FALL ASLEEP WHILE WATCHING TV: MODERATE CHANCE OF DOZING
HOW LIKELY ARE YOU TO NOD OFF OR FALL ASLEEP WHILE SITTING AND TALKING TO SOMEONE: SLIGHT CHANCE OF DOZING
HOW LIKELY ARE YOU TO NOD OFF OR FALL ASLEEP WHILE SITTING AND READING: HIGH CHANCE OF DOZING

## 2025-01-20 NOTE — PATIENT INSTRUCTIONS
[facility] Sleep Medicine   [facility]  [address] [address]       NAME: Shannon Recio   DATE: 01/20/25    Your Sleep Provider Today: MASTER Kelley  Your Primary Care Physician: Lee Ann Sellers MD       DIAGNOSIS:   1. MARYA (obstructive sleep apnea)        2. Obesity (BMI 30.0-34.9)        3. Nonsmoker        4. Excessive daytime sleepiness            Thank you for coming to the Sleep Medicine Clinic today! Your sleep medicine provider today was: MASTER Kelley Below is a summary of your treatment plan, other important information, and our contact numbers:      TREATMENT PLAN     - Follow-up in 12 months.  - If not already done, sign up for 'My Chart' and send prescription requests or messages through this    Annual Reminders About Your Sleep Apnea    Your sleep apnea is well controlled based on reviewing your PAP Data Report. A supply renewal prescription has been sent to your DME company.     General Reminders:  Continue current machine settings. Continue using machine every night. Need at least 4 hours daily usage.   Remember to clean your mask, tubings, and water chamber regularly as instructed.  Know the replacement schedule of your supplies/ accessories and contact your DME (durable medical equipment) provider if you are due for them.  Avoid driving or operating heavy machinery when drowsy. A person driving while sleepy is 5 times more likely to have an accident. If you feel sleepy, pull over and take a short power nap (sleep for less than 30 minutes). Otherwise, ask somebody to drive you.    Follow-up sooner through MyChart or calling our office if you have any of the following symptoms:  Snoring or stopping breathing while using the machine  Recurrence of fatigue, sleepiness, insomnia, and other symptoms you had prior using machine  Persistent or worsening fatigue or sleepiness despite regular use of machine  Issues  tolerating the machine like bloating sensation, air hunger, too much hot air, too much pressure, taking off mask without recall in the middle of sleep, etc.     Other conservative measures to improve sleep apnea:  Losing weight can lead to some improvement of MARYA which means you will need lower pressures in machine to control your MARYA. In some patients, they don't need to use the machine after bariatric surgery. Hence, consider medical and/or surgical weight loss especially if your BMI is more than 35.  Avoiding alcohol, sedative-hypnotics, and sedating medications is imperative as these substances can worsen snoring and sleep apnea  If you have nasal congestion or seasonal allergies, improving your breathing through the nose is critical for treating sleep apnea, tolerating CPAP, and improving your sleep; hence, using intranasal steroid spray like Flonase might help. Make sure you know the proper way to use it.  Stay off your back when sleeping.    Common issues with PAP machine:  Mask gets dislodged when turning to the side: Consider getting a CPAP pillow or switching to a mask with hose on top.     Dry mouth:  Your machine has built-in humidifier that heats up the air to prevent dry mouth. It can be adjusted to your comfort. You can try that first and increase setting one level one night at a time to check which setting is comfortable and effective in lessening dry mouth. If dry mouth persists despite humidity setting adjustment, may apply OTC Biotene gel over the gums at bedtime.  If Biotene gel is not effective, consider trying XEROSTOM gel from [employer].  Also, eliminate or reduce dose of meds that can cause dry mouth if possible. Lastly, may try getting a separate room humidifier machine.    Airleaks: Please call DME as they may need to adjust your mask or refit you with a different kind of mask. In addition, you can ask DME for tips on getting a good mask seal and mask fit.     Difficulty tolerating the  "mask: Contact your DME to try a different kind of mask and/or call office to get a referral to Sleep Psychologist for CPAP desensitization. CPAP desensitization technique is a set of strategies that helps patient cope with claustrophobia and anxiety related to wearing mask. Alternatively, we can do a daytime mini-sleep study called PAP-nap trial wherein you will try on different kinds of mask and the sleep technician will try different pressure settings on CPAP and BPAP machines to see which specific pressure is tolerable and comfortable for you.     Water droplets or moisture within the hose and/or mask: This is called rain-out and it is caused by condensation of too much heated humidity on the cooler walls of the hose. If you have rain-out, turn down humidity settings or get a heated hose. If you already have a heated hose, turn up the \"tube temperature\" of the heated hose. Alternatively, if you don't want to get a heated hose or warmer air, may wrap the CPAP hose with stockings to keep it somewhat warm. Also, you need to place the machine on the floor and lower the hose so that water won't travel upward towards your mask.       Maintaining your CPAP/BPAP device:    The humidification chamber (aka water tank or water chamber) needs to be filled with distilled water to prevent buildup of white deposits in the future. If you cannot find distilled water, you can use tap water but expect to have white deposits buildup seen after prolonged use with tap water. If you start seeing white deposits on the water chamber, you can clean it by filling it with equal parts of distilled white vinegar and water. Let the vinegar-water mixture sit for 2 hours, and then rinse it with running tap water. Clean with soap and water then let it dry.     You should try to keep your machine clean in order to work well. Here are some tips to clean PAP supplies / accessories:    Clean the humidification chamber (aka water tank) as well as your " mask and tubing at least once a week with soap and water.   Alternatively, you can fill a sink or basin with warm water and add a little mild detergent, like Ivory dish soap. Gently wipe your supplies with the soapy water to free all the oils and dirt that may have collected. Once that's done, rinse these items with clean water until the soap is gone and let them air dry. You can hang your tubing over the curtain luciana in your bathroom so that it dries.  The mask insert (part of the mask that has contact with your skin) needs to be cleaned with soap and water daily. Another option is to wipe them down with CPAP wipes or baby wipes.    You should replace your mask and tubing frequently in order to prevent bacteria buildup, machine damage, and mask seal issues. The older the mask and hose, the high likelihood that there is bacteria buildup in it especially if they are not cleaned regularly. Dirty filters damage machines because build-up of dust and contaminants can cause machine to over-heat, and in time, damage the motor of machine. Cushions lose their seal over time as most masks are made of plastic and silicone while headgear is made of neoprene. These materials will break down with age and frequent use. Here is the recommended replacement schedule for PAP supplies / accessories:    Twice a month- disposable filters and cushions for nasal mask or nasal pillows.  Once a month- cushion for full face mask  Every 3 months- mask with headgear and PAP tubing (standard or heated hose)  Every 6 months- reusable filter, water chamber, and chin strap     Other useful information:    Some people do not put water in the tank while other people prefers to put water in the tank to prevent mouth dryness. Try to experiment to determine which is more comfortable for you.   In general, new machines have 2 years warranty on parts while health insurance allows you to have a new machine once every 5 years.      IMPORTANT INFORMATION  "    Call 911 for medical emergencies.  Our offices are generally open from Monday-Friday, 9 am - 5 pm.  If you need to get in touch with me, you may either call me/my team (number is below) or you can use [employer].  If a referral for a test, for CPAP, or for another specialist was made, and you have not heard about scheduling this within a week, please call scheduling at [phone]).  If you are unable to make your appointment for clinic or an overnight study, kindly call the office at least 48 hours in advance to cancel and reschedule.  If you are on CPAP, please bring your device's card or the device to each clinic appointment.   There are no supporting services by either the sleep doctors or their staff on weekends and Holidays, or after 5 PM on weekdays.     PRESCRIPTIONS     We require 7 days advanced notice for prescription refills. If we do not receive the request in this time, we cannot guarantee that your medication will be refilled in time.    IMPORTANT PHONE NUMBERS     [facility]: [phone]  [employer] ([employer]): [phone]  [employer] ([employer]): [phone]  [facility] (DME): [phone]    CONTACTING YOUR SLEEP MEDICINE PROVIDER AND SLEEP TEAM      For issues with your machine or mask interface, please call your DME provider first. [employer] stands for durable medical company. [employer] is the company who provides you the machine and/or PAP supplies / accessories.   To schedule, cancel, or reschedule SLEEP STUDY APPOINTMENTS, please call the Main Phone Line at [phone] - option 3.   To schedule, cancel, or reschedule CLINIC APPOINTMENTS, you can do it in \"MyChart\", call [phone] for [facility] office to speak with my on site staff, or call the Main Phone Line at [phone] - option 2  For CLINICAL QUESTIONS or MEDICATION REFILLS, please call direct line for Adult Sleep Nurses at [phone].   Lastly, you can also send a message directly to " "your provider through \"My Chart\", which is the email service through your  Records Account: [email or web address]     Adult Sleep Nurses (Elmira Ordonez, RN and Nicole Najera, RN):  For clinical questions and refilling prescriptions: [phone]  Email sleep diaries and other documents at: [email or web address]    Office locations for Nelly Marlow NP:    [facility]   [address]   [address]  [place] [address] [phone]    [facility]  [address]  [address]  [place], [place] [address] [phone]    OUR SLEEP TESTING LOCATIONS     Our team will contact you to schedule your sleep study, however, you can contact us as follow:  Main Phone Line (scheduling only): [phone]), option 3    Sleep Testing Locations:   [place] (18 years and older): [address] [place] [address], 2nd floor  [facility] (18 years and older): [address]; 4th floor  After hours line: [phone]  [facility] (18 years and older) at [place]: [id number] [place] [address]  After hours line: [phone]   [facility] (Main campus: All ages): [facility], 6th floor. After hours line: [phone]   [place] (5 years and older; younger considered on case-by-case basis): [address] [place] [address]; [facility] [address], [address]. Scheduling  After hours line: [phone]       Here at [facility], we wish you a restful sleep!    Your sleep medicine provider for this visit was: Nelly Marlow, APRN-CNP   "

## 2025-01-28 ENCOUNTER — TELEPHONE (OUTPATIENT)
Dept: HEMATOLOGY/ONCOLOGY | Facility: CLINIC | Age: 45
End: 2025-01-28
Payer: COMMERCIAL

## 2025-01-28 DIAGNOSIS — Z17.0 MALIGNANT NEOPLASM OF OVERLAPPING SITES OF LEFT BREAST IN FEMALE, ESTROGEN RECEPTOR POSITIVE: ICD-10-CM

## 2025-01-28 DIAGNOSIS — C50.812 MALIGNANT NEOPLASM OF OVERLAPPING SITES OF LEFT BREAST IN FEMALE, ESTROGEN RECEPTOR POSITIVE: ICD-10-CM

## 2025-01-28 NOTE — TELEPHONE ENCOUNTER
Refill of medication pended to Dr. Kumar after patient asking for [facility]

## 2025-01-28 NOTE — TELEPHONE ENCOUNTER
TELEMED 01/27/2025 @ 4:24pm:  Needs to change pharmacy for Ibrance.  Araceli Lee not able to order.  Rx needs to go back to [facility].

## 2025-01-28 NOTE — TELEPHONE ENCOUNTER
Spoke with the patient. Aware medication sent to [facility]. Pt had no further questions or concerns at this time.

## 2025-03-05 ENCOUNTER — HOSPITAL ENCOUNTER (OUTPATIENT)
Dept: RADIOLOGY | Facility: HOSPITAL | Age: 45
Discharge: HOME | End: 2025-03-05
Payer: COMMERCIAL

## 2025-03-05 ENCOUNTER — APPOINTMENT (OUTPATIENT)
Dept: RADIOLOGY | Facility: HOSPITAL | Age: 45
End: 2025-03-05
Payer: COMMERCIAL

## 2025-03-05 ENCOUNTER — LAB (OUTPATIENT)
Dept: LAB | Facility: CLINIC | Age: 45
End: 2025-03-05
Payer: COMMERCIAL

## 2025-03-05 DIAGNOSIS — Z17.0 MALIGNANT NEOPLASM OF OVERLAPPING SITES OF LEFT BREAST IN FEMALE, ESTROGEN RECEPTOR POSITIVE: ICD-10-CM

## 2025-03-05 DIAGNOSIS — C50.812 MALIGNANT NEOPLASM OF OVERLAPPING SITES OF LEFT BREAST IN FEMALE, ESTROGEN RECEPTOR POSITIVE: ICD-10-CM

## 2025-03-05 LAB
ALBUMIN SERPL BCP-MCNC: 4.7 G/DL (ref 3.4–5)
ALP SERPL-CCNC: 64 U/L (ref 33–110)
ALT SERPL W P-5'-P-CCNC: 59 U/L (ref 7–45)
ANION GAP SERPL CALC-SCNC: 13 MMOL/L (ref 10–20)
AST SERPL W P-5'-P-CCNC: 32 U/L (ref 9–39)
BASOPHILS # BLD AUTO: 0.02 X10*3/UL (ref 0–0.1)
BASOPHILS NFR BLD AUTO: 0.9 %
BILIRUB SERPL-MCNC: 0.3 MG/DL (ref 0–1.2)
BUN SERPL-MCNC: 24 MG/DL (ref 6–23)
CALCIUM SERPL-MCNC: 9.2 MG/DL (ref 8.6–10.3)
CHLORIDE SERPL-SCNC: 106 MMOL/L (ref 98–107)
CO2 SERPL-SCNC: 26 MMOL/L (ref 21–32)
CREAT SERPL-MCNC: 0.81 MG/DL (ref 0.5–1.05)
EGFRCR SERPLBLD CKD-EPI 2021: >90 ML/MIN/1.73M*2
EOSINOPHIL # BLD AUTO: 0.04 X10*3/UL (ref 0–0.7)
EOSINOPHIL NFR BLD AUTO: 1.8 %
ERYTHROCYTE [DISTWIDTH] IN BLOOD BY AUTOMATED COUNT: 12.7 % (ref 11.5–14.5)
GLUCOSE SERPL-MCNC: 122 MG/DL (ref 74–99)
HCT VFR BLD AUTO: 34.4 % (ref 36–46)
HGB BLD-MCNC: 11.8 G/DL (ref 12–16)
IMM GRANULOCYTES # BLD AUTO: 0 X10*3/UL (ref 0–0.7)
IMM GRANULOCYTES NFR BLD AUTO: 0 % (ref 0–0.9)
LYMPHOCYTES # BLD AUTO: 0.59 X10*3/UL (ref 1.2–4.8)
LYMPHOCYTES NFR BLD AUTO: 26 %
MCH RBC QN AUTO: 35.4 PG (ref 26–34)
MCHC RBC AUTO-ENTMCNC: 34.3 G/DL (ref 32–36)
MCV RBC AUTO: 103 FL (ref 80–100)
MONOCYTES # BLD AUTO: 0.32 X10*3/UL (ref 0.1–1)
MONOCYTES NFR BLD AUTO: 14.1 %
NEUTROPHILS # BLD AUTO: 1.3 X10*3/UL (ref 1.2–7.7)
NEUTROPHILS NFR BLD AUTO: 57.2 %
NRBC BLD-RTO: ABNORMAL /100{WBCS}
OVALOCYTES BLD QL SMEAR: NORMAL
PLATELET # BLD AUTO: 201 X10*3/UL (ref 150–450)
POTASSIUM SERPL-SCNC: 4.4 MMOL/L (ref 3.5–5.3)
PROT SERPL-MCNC: 6.8 G/DL (ref 6.4–8.2)
RBC # BLD AUTO: 3.33 X10*6/UL (ref 4–5.2)
RBC MORPH BLD: NORMAL
SODIUM SERPL-SCNC: 141 MMOL/L (ref 136–145)
WBC # BLD AUTO: 2.3 X10*3/UL (ref 4.4–11.3)

## 2025-03-05 PROCEDURE — 3430000001 HC RX 343 DIAGNOSTIC RADIOPHARMACEUTICALS: Performed by: INTERNAL MEDICINE

## 2025-03-05 PROCEDURE — A9502 TC99M TETROFOSMIN: HCPCS | Performed by: INTERNAL MEDICINE

## 2025-03-05 PROCEDURE — 2550000001 HC RX 255 CONTRASTS: Performed by: INTERNAL MEDICINE

## 2025-03-05 PROCEDURE — 36415 COLL VENOUS BLD VENIPUNCTURE: CPT

## 2025-03-05 PROCEDURE — 86300 IMMUNOASSAY TUMOR CA 15-3: CPT

## 2025-03-05 PROCEDURE — 89240 UNLISTED MISC PATH TEST: CPT

## 2025-03-05 PROCEDURE — 85025 COMPLETE CBC W/AUTO DIFF WBC: CPT

## 2025-03-05 PROCEDURE — 84075 ASSAY ALKALINE PHOSPHATASE: CPT

## 2025-03-05 PROCEDURE — 78306 BONE IMAGING WHOLE BODY: CPT

## 2025-03-05 PROCEDURE — 74177 CT ABD & PELVIS W/CONTRAST: CPT

## 2025-03-05 RX ADMIN — IOHEXOL 75 ML: 350 INJECTION, SOLUTION INTRAVENOUS at 09:16

## 2025-03-05 RX ADMIN — TETROFOSMIN 24.8 MILLICURIE: 0.23 INJECTION, POWDER, LYOPHILIZED, FOR SOLUTION INTRAVENOUS at 08:45

## 2025-03-06 LAB — CANCER AG27-29 SERPL-ACNC: 17.4 U/ML (ref 0–38.6)

## 2025-03-06 NOTE — PROGRESS NOTES
Patient ID: Shannon Recio is a 44 y.o. female.    Oncology History   Malignant neoplasm of overlapping sites of left breast in female, estrogen receptor positive   10/24/2023 Initial Diagnosis    Malignant neoplasm of overlapping sites of left breast in female, estrogen receptor positive (CMS/HCC)     1/15/2024 -  Chemotherapy    Palbociclib + Aromatase Inhibitor, 28 Day Cycle           Subjective    HPI  Ms. Shannon Recio is a 44 y.o. female who presents for follow-up for left breast cancer. Since last visit she did have a CT CAP, 3/5/25 with no new evidence of disease; there are 2 indeterminate lesions in the kidney that have increased in size, but are in keeping with renal cysts. Bone scan was also completed that day, 3/5/25, does not show any evidence of disease.    CA 27-29 normal at 17.4 U/mL  Signatera collected 3/5/25 pending    She reports she has been well. She denies chest pain, palpitations, pressure in the chest.     She has been having issues procuring her medications due to insurance and availability issues.       Patient's past medical history, surgical history, family history and social history reviewed.    Review of Systems:   Review of Systems:    Positive per HPI, otherwise negative.       Objective    Vitals  BSA: 1.76 meters squared  /74 (BP Location: Right arm, Patient Position: Sitting)   Pulse 93   Temp 36 °C (96.8 °F) (Temporal)   Resp 17   Wt 72.1 kg (158 lb 15.2 oz)   SpO2 93%   BMI 30.03 kg/m²        Physical Exam  Gen: appears well in clinic, NAD  HEENT: atraumatic head, normocephalic, EOMI, conjunctiva normal  LUNG: no increased WOB, CTAB  CV: No JVD. RRR  GI: soft, NT, ND  LE: no LE edema  Skin: no obvious rashes or lesions on visible skin  Neuro: interactive, no focal deficits noted  Psych: normal mood and affect    Performance Status:  Symptomatic; fully ambulatory    Labs/Imaging/Pathology: personally reviewed reports and images in Epic electronic medical  record system. Pertinent results as it related to the plan represented in below in assessment and plan.     Component      Latest Ref Rng 1/13/2025 3/5/2025   WBC      4.4 - 11.3 x10*3/uL 2.1 (L)  2.3 (L)    RBC      4.00 - 5.20 x10*6/uL 3.25 (L)  3.33 (L)    HEMOGLOBIN      12.0 - 16.0 g/dL 11.7 (L)  11.8 (L)    HEMATOCRIT      36.0 - 46.0 % 33.1 (L)  34.4 (L)    MCV      80 - 100 fL 102 (H)  103 (H)    MCHC      32.0 - 36.0 g/dL 35.3  34.3    Platelets      150 - 450 x10*3/uL 178  201    RED CELL DISTRIBUTION WIDTH      11.5 - 14.5 % 12.3  12.7    Neutrophils %      40.0 - 80.0 % 63.2  57.2    Immature Granulocytes %, Automated      0.0 - 0.9 % 0.0  0.0    Lymphocytes %      13.0 - 44.0 % 25.2  26.0    Monocytes %      2.0 - 10.0 % 9.3  14.1    Eosinophils %      0.0 - 6.0 % 1.4  1.8    Basophils %      0.0 - 2.0 % 0.9  0.9    Neutrophils Absolute      1.20 - 7.70 x10*3/uL 1.35  1.30    Lymphocytes Absolute      1.20 - 4.80 x10*3/uL 0.54 (L)  0.59 (L)    Monocytes Absolute      0.10 - 1.00 x10*3/uL 0.20  0.32    Eosinophils Absolute      0.00 - 0.70 x10*3/uL 0.03  0.04    Basophils Absolute      0.00 - 0.10 x10*3/uL 0.02  0.02    nRBC  --    MCH      26.0 - 34.0 pg 36.0 (H)  35.4 (H)    Immature Granulocytes Absolute, Automated      0.00 - 0.70 x10*3/uL 0.00  0.00       Legend:  (L) Low  (H) High    Component      Latest Ref Rng 1/13/2025 3/5/2025   GLUCOSE      74 - 99 mg/dL 106 (H)  122 (H)    SODIUM      136 - 145 mmol/L 138  141    POTASSIUM      3.5 - 5.3 mmol/L 4.4  4.4    CHLORIDE      98 - 107 mmol/L 104  106    Bicarbonate      21 - 32 mmol/L 28  26    Anion Gap      10 - 20 mmol/L 10  13    Blood Urea Nitrogen      6 - 23 mg/dL 22  24 (H)    Creatinine      0.50 - 1.05 mg/dL 0.76  0.81    Calcium      8.6 - 10.3 mg/dL 9.1  9.2    Albumin      3.4 - 5.0 g/dL 4.4  4.7    Alkaline Phosphatase      33 - 110 U/L 56  64    Total Protein      6.4 - 8.2 g/dL 6.9  6.8    AST      9 - 39 U/L 30  32    Bilirubin  Total      0.0 - 1.2 mg/dL 0.4  0.3    ALT      7 - 45 U/L 55 (H)  59 (H)    EGFR      >60 mL/min/1.73m*2 >90  >90       Legend:  (H) High    Component      Latest Ref Rng 9/5/2024 3/5/2025   CA 27.29      0.0 - 38.6 U/mL 22.3  17.4          Assessment/Plan   1. Left breast IDC, stage IIB [id number], ER>95% [place]>95% , HER2 neg, low risk mammaprint  2. Biopsy proven metastases to liver 8/1/22, hormone positive     - initially palpated a left breast mass with nipple changes in 3/21/2021 and she was referred to Dr. Waterman - diagnostic mammogram with tomosynthesis on 3/30/2022 and there was a mass highly suspicious of malignancy in the left breast. No findings in  the right breast. She underwent subsequently a ultrasound on the same day and she was found to have an irregular spiculated hypoechoic shadowing mass measuring 1.9 x 1.6 x 2.9 cm at 1 o'clock, 3 cm from the nipple. There were 8 lymph nodes noted in the  left axilla with 2-3 lymph nodes of intermediate suspicion of involvement  - core needle biopsy on 4/11/2022 and final pathology shows invasive ductal carcinoma, grade 2, in DCIS in the breast mass. The most suspicious lymph node was also biopsied and negative for carcinoma  - 5/13/22 bilateral mastectomy. Left breast showed invasive ductal carcinoma, grade 3 with DCIS. Final tumor size was 3.4 cm. Grade 3 with no extensive intraductal component. There was indeterminant left LVI. DCIS was present. All margins were negative.  There was also macrometastatic carcinoma in 2/4 lymph nodes. End stage N1a. Given stage 2 disease, she did not have staging scans completed  - initially discussed with her low risk mammaprint despite higher clinical risk with node involvement, ultimately decided to not pursue adjuvant chemotherapy   - during CT sim for RT planning, found to have concerning breast lesion  - Liver biopsy 8/1/22 consistent with primary breast cancer  - today discussed new diagnosis of metastatic disease and  overall approach to therapy and prognosis  - Reviewed first line therapy with CDK 4/6 inhibitor plus AI inhibitor  - Regarding choice of CDK4/6 inhibitor, given liver involvement I discussed with patient that although there is some more longer survival data with ribociclib, I would be concerned about risk of hepatotoxicity as if there is no other distant disease we  could consider treatment of oligometastatic disease  - 8/2022- started Ibrance 125 mg PO daily D1-21 every 28 days with letrozole 2.5 mg PO daily  - bilateral salpingo-oophorectomy on 10/10/22  - Signatera from 11/7 0.07 MTM/mL  - complete 50 Gy in 25 fractions to her regional nodes and chest wall along with SBRT completed on 5/22/2023. She also underwent SBRT to the liver metastases from 4/4 to 4/11/2023, for a total of 54 Gy in 3 fractions.   - PET CT from 1/23/23, which shows there is no clear evidence of disease.   -  scans from 7/20/23. No new evidence of recurrence.   - imaging from 12/11/23 shows no new evidence of recurrence in the bone, chest, abdomen or pelvis.   - 2/12/24:  - Patient overall doing well on Ibrance, labs unremarkable. No contraindication to proceed with Zometa today.  - She does have some new right rib and chest pain for the last month. Will plan to evaluate further with CT of the chest.  - Will plan for a telephone visit for followup on the day after CT chest 2/27/24.     3/11/24: Telephone call  - Overall, CT chest from 2/26/24 is unremarkable.   - Blood work from 2/12/24 also unremarkable.   - At this point, we will make no changes and plan for repeat blood work in 3 months.  - RTC in 3 months.      6/3/24:   - No remarkable findings on exam today.  - Labs today are pending.  - Overall tolerating Ibrance  - She has started these supplements and met with Dr. Collazo.  - Refills provided for Ibrance and exemestane.  - Today, she is worried about her hair loss. Recently started topical minoxidil.  - We'll check testosterone level as  she is does have a family history of PCOS.  - I will also add spironolactone 50 mg daily, which we can increase to 100 mg if she tolerates it.  - Plan for PET scan in three months. It'll be two years at that point of being on a CDK 46 inhibitor.  - We could consider stopping the Ibrance and continuing exemestane alone.  - RTC in three months     7/15/2024:   - Patient presents for problem focused visit   - She had an infected are of her left upper thigh/groin area, possible infected hair follicle or ingrown hair   - s/p doxycycline 100 mg BID x10 days with improvement of symptoms after site opened and drained during ABX course   - Now with healing skin that is closed and no signs of infection, there is still a small bump under her skin at the site that was not there prior to infection per patient, therefore, we will have her continue to monitor the site while in the shower so that site stays clean. She was advised to call us if she notices the area enlarging, developing infectious signs/symptoms, or does not go away. Or if new areas of lumps/bumps arise  - There is no inguinal lymphadenopathy on exam today and no edema noted in groin or leg area   - Educated the patient that likely the body will absorb the area of post-infection as it continues to heal and bump will go away   - Since she is otherwise feeling normal and well we do not need lab work today and no further testing at this time   - Above information and findings reviewed with Dr. Kumar as well   - Patient will return as scheduled in September for next visit after a PET scan unless she has new/worsening/persisting symptoms prior      9/5/24:  - Recent PET CT yesterday shows no FGD avid disease.  - Patient is now 2 years out.  - We discussed there is really no data to stop treatment.  - Due to some fatigue we discussed we will plan to cut back Ibrance to 100 mg to see if this helps with the fatigue.  - Will plan to continue Zometa for 3-5 years every 6  months.  - Next CT c/a/p  and bone scan in 6 months.  - In the future we can try and get her scans and Zometa on schedule together.  - RTC in 3 with me for Zometa and in 6 months for scans.     3/7/25:   - No evidence of clear disease progression on imaging   - She does have some densities on the kidneys that are enlarging, though most consistent with cysts. We discussed we will plan for MRI to follow up.    - She does have some concerns for fatty liver and pre diabetes. We discussed the importance of lifestyle changes; can consider GLP in the future.    - Otherwise, will continue IBRANCE as scheduled   - RTC for phone visit after MRI   - Likely moving forward with plan for labs in 3 months and scans in 6 months    Reviewed ongoing medical problems and how they relate to his malignancy, will continue long term monitoring.    RTC in 3 months for labs, 6 months with scans  This note has been transcribed using a medical scribe and there is a possibility of unintentional typing misprints    Diagnoses and all orders for this visit:  Malignant neoplasm of overlapping sites of left breast in female, estrogen receptor positive  -     Clinic Appointment Request  -     MR kidney w and wo IV contrast; Future  -     Clinic Appointment Request Virtual Est (end of day phone); Future    Sindhu Kumar MD  Hematology/Oncology  [facility]      Scribe Attestation  By signing my name below, ITanisha Scribe, attest that this documentation has been prepared under the direction and in the presence of Sindhu Kumar MD.    Time Spent  Prep time on day of patient encounter: 5 minutes  Time spent directly with patient, family or caregiver: 20 minutes  Additional Time Spent on Patient Care Activities: 5 minutes  Documentation Time: 5 minutes  Other Time Spent: 0 minutes  Total: 35 minutes

## 2025-03-07 ENCOUNTER — OFFICE VISIT (OUTPATIENT)
Dept: HEMATOLOGY/ONCOLOGY | Facility: CLINIC | Age: 45
End: 2025-03-07
Payer: COMMERCIAL

## 2025-03-07 VITALS
SYSTOLIC BLOOD PRESSURE: 126 MMHG | DIASTOLIC BLOOD PRESSURE: 74 MMHG | WEIGHT: 158.95 LBS | BODY MASS INDEX: 30.03 KG/M2 | HEART RATE: 93 BPM | RESPIRATION RATE: 17 BRPM | OXYGEN SATURATION: 93 % | TEMPERATURE: 96.8 F

## 2025-03-07 DIAGNOSIS — Z17.0 MALIGNANT NEOPLASM OF OVERLAPPING SITES OF LEFT BREAST IN FEMALE, ESTROGEN RECEPTOR POSITIVE: ICD-10-CM

## 2025-03-07 DIAGNOSIS — C50.812 MALIGNANT NEOPLASM OF OVERLAPPING SITES OF LEFT BREAST IN FEMALE, ESTROGEN RECEPTOR POSITIVE: ICD-10-CM

## 2025-03-07 PROCEDURE — 99214 OFFICE O/P EST MOD 30 MIN: CPT | Performed by: INTERNAL MEDICINE

## 2025-03-07 PROCEDURE — G2211 COMPLEX E/M VISIT ADD ON: HCPCS | Performed by: INTERNAL MEDICINE

## 2025-03-07 ASSESSMENT — PAIN SCALES - GENERAL: PAINLEVEL_OUTOF10: 0-NO PAIN

## 2025-03-25 ENCOUNTER — HOSPITAL ENCOUNTER (OUTPATIENT)
Dept: RADIOLOGY | Facility: CLINIC | Age: 45
Discharge: HOME | End: 2025-03-25
Payer: COMMERCIAL

## 2025-03-25 DIAGNOSIS — C50.812 MALIGNANT NEOPLASM OF OVERLAPPING SITES OF LEFT BREAST IN FEMALE, ESTROGEN RECEPTOR POSITIVE: ICD-10-CM

## 2025-03-25 DIAGNOSIS — Z17.0 MALIGNANT NEOPLASM OF OVERLAPPING SITES OF LEFT BREAST IN FEMALE, ESTROGEN RECEPTOR POSITIVE: ICD-10-CM

## 2025-03-25 PROCEDURE — 2550000001 HC RX 255 CONTRASTS: Performed by: INTERNAL MEDICINE

## 2025-03-25 PROCEDURE — 74183 MRI ABD W/O CNTR FLWD CNTR: CPT

## 2025-03-25 PROCEDURE — A9575 INJ GADOTERATE MEGLUMI 0.1ML: HCPCS | Performed by: INTERNAL MEDICINE

## 2025-03-25 RX ORDER — GADOTERATE MEGLUMINE 376.9 MG/ML
15 INJECTION INTRAVENOUS
Status: COMPLETED | OUTPATIENT
Start: 2025-03-25 | End: 2025-03-25

## 2025-03-25 RX ADMIN — GADOTERATE MEGLUMINE 15 ML: 376.9 INJECTION INTRAVENOUS at 09:50

## 2025-03-28 ENCOUNTER — TELEMEDICINE (OUTPATIENT)
Dept: HEMATOLOGY/ONCOLOGY | Facility: CLINIC | Age: 45
End: 2025-03-28
Payer: COMMERCIAL

## 2025-03-28 DIAGNOSIS — C50.812 MALIGNANT NEOPLASM OF OVERLAPPING SITES OF LEFT BREAST IN FEMALE, ESTROGEN RECEPTOR POSITIVE: Primary | ICD-10-CM

## 2025-03-28 DIAGNOSIS — Z17.0 MALIGNANT NEOPLASM OF OVERLAPPING SITES OF LEFT BREAST IN FEMALE, ESTROGEN RECEPTOR POSITIVE: Primary | ICD-10-CM

## 2025-03-28 PROCEDURE — 99213 OFFICE O/P EST LOW 20 MIN: CPT | Performed by: INTERNAL MEDICINE

## 2025-03-28 PROCEDURE — G2211 COMPLEX E/M VISIT ADD ON: HCPCS | Performed by: INTERNAL MEDICINE

## 2025-03-28 NOTE — PROGRESS NOTES
Consent:  Verbal consent was requested and obtained from patient on this date for a telehealth visit.    Patient ID: Shannon Recio is a 44 y.o. female.    Oncology History   Malignant neoplasm of overlapping sites of left breast in female, estrogen receptor positive   10/24/2023 Initial Diagnosis    Malignant neoplasm of overlapping sites of left breast in female, estrogen receptor positive (CMS/HCC)     1/15/2024 -  Chemotherapy    Palbociclib + Aromatase Inhibitor, 28 Day Cycle           Subjective    HPI  A telephone visit (audio only) between the patient at home and the provider at [facility] [place] was utilized to provide this telehealth service.     Shannon Recio is a 44 y.o. female presenting for follow up of left breast cancer. Since last visit, had MRI kidney, which showed simple renal cysts and no other clear findings.     She had a recent upper respiratory tract infection but remains otherwise well. She denies nausea, vomiting, diarrhea, constipation.    Patient's past medical history, surgical history, family history and social history reviewed.    Review of Systems:   Review of Systems:    Positive per HPI, otherwise negative.       Objective    BSA: There is no height or weight on file to calculate BSA.  There were no vitals taken for this visit.     Physical Exam    Performance Status:  Symptomatic; fully ambulatory    Labs/Imaging/Pathology: Personally reviewed reports and images in Epic electronic medical record system. Pertinent results as it related to the plan represented in below in assessment and plan.         Assessment/Plan    1. Left breast IDC, stage IIB [id number], ER>95% IA>95% , HER2 neg, low risk mammaprint  2. Biopsy proven metastases to liver 8/1/22, hormone positive     - initially palpated a left breast mass with nipple changes in 3/21/2021 and she was referred to Dr. Waterman - diagnostic mammogram with tomosynthesis on 3/30/2022 and there was a mass  highly suspicious of malignancy in the left breast. No findings in  the right breast. She underwent subsequently a ultrasound on the same day and she was found to have an irregular spiculated hypoechoic shadowing mass measuring 1.9 x 1.6 x 2.9 cm at 1 o'clock, 3 cm from the nipple. There were 8 lymph nodes noted in the  left axilla with 2-3 lymph nodes of intermediate suspicion of involvement  - core needle biopsy on 4/11/2022 and final pathology shows invasive ductal carcinoma, grade 2, in DCIS in the breast mass. The most suspicious lymph node was also biopsied and negative for carcinoma  - 5/13/22 bilateral mastectomy. Left breast showed invasive ductal carcinoma, grade 3 with DCIS. Final tumor size was 3.4 cm. Grade 3 with no extensive intraductal component. There was indeterminant left LVI. DCIS was present. All margins were negative.  There was also macrometastatic carcinoma in 2/4 lymph nodes. End stage N1a. Given stage 2 disease, she did not have staging scans completed  - initially discussed with her low risk mammaprint despite higher clinical risk with node involvement, ultimately decided to not pursue adjuvant chemotherapy   - during CT sim for RT planning, found to have concerning breast lesion  - Liver biopsy 8/1/22 consistent with primary breast cancer  - today discussed new diagnosis of metastatic disease and overall approach to therapy and prognosis  - Reviewed first line therapy with CDK 4/6 inhibitor plus AI inhibitor  - Regarding choice of CDK4/6 inhibitor, given liver involvement I discussed with patient that although there is some more longer survival data with ribociclib, I would be concerned about risk of hepatotoxicity as if there is no other distant disease we  could consider treatment of oligometastatic disease  - 8/2022- started Ibrance 125 mg PO daily D1-21 every 28 days with letrozole 2.5 mg PO daily  - bilateral salpingo-oophorectomy on 10/10/22  - Signatera from 11/7 0.07 MTM/mL  -  complete 50 Gy in 25 fractions to her regional nodes and chest wall along with SBRT completed on 5/22/2023. She also underwent SBRT to the liver metastases from 4/4 to 4/11/2023, for a total of 54 Gy in 3 fractions.   - PET CT from 1/23/23, which shows there is no clear evidence of disease.   -  scans from 7/20/23. No new evidence of recurrence.   - imaging from 12/11/23 shows no new evidence of recurrence in the bone, chest, abdomen or pelvis.   - 2/12/24:  - Patient overall doing well on Ibrance, labs unremarkable. No contraindication to proceed with Zometa today.  - She does have some new right rib and chest pain for the last month. Will plan to evaluate further with CT of the chest.  - Will plan for a telephone visit for followup on the day after CT chest 2/27/24.     3/11/24: Telephone call  - Overall, CT chest from 2/26/24 is unremarkable.   - Blood work from 2/12/24 also unremarkable.   - At this point, we will make no changes and plan for repeat blood work in 3 months.  - RTC in 3 months.      6/3/24:   - No remarkable findings on exam today.  - Labs today are pending.  - Overall tolerating Ibrance  - She has started these supplements and met with Dr. Collazo.  - Refills provided for Ibrance and exemestane.  - Today, she is worried about her hair loss. Recently started topical minoxidil.  - We'll check testosterone level as she is does have a family history of PCOS.  - I will also add spironolactone 50 mg daily, which we can increase to 100 mg if she tolerates it.  - Plan for PET scan in three months. It'll be two years at that point of being on a CDK 46 inhibitor.  - We could consider stopping the Ibrance and continuing exemestane alone.  - RTC in three months     7/15/2024:   - Patient presents for problem focused visit   - She had an infected are of her left upper thigh/groin area, possible infected hair follicle or ingrown hair   - s/p doxycycline 100 mg BID x10 days with improvement of symptoms after site  opened and drained during ABX course   - Now with healing skin that is closed and no signs of infection, there is still a small bump under her skin at the site that was not there prior to infection per patient, therefore, we will have her continue to monitor the site while in the shower so that site stays clean. She was advised to call us if she notices the area enlarging, developing infectious signs/symptoms, or does not go away. Or if new areas of lumps/bumps arise  - There is no inguinal lymphadenopathy on exam today and no edema noted in groin or leg area   - Educated the patient that likely the body will absorb the area of post-infection as it continues to heal and bump will go away   - Since she is otherwise feeling normal and well we do not need lab work today and no further testing at this time   - Above information and findings reviewed with Dr. Kumar as well   - Patient will return as scheduled in September for next visit after a PET scan unless she has new/worsening/persisting symptoms prior      9/5/24:  - Recent PET CT yesterday shows no FGD avid disease.  - Patient is now 2 years out.  - We discussed there is really no data to stop treatment.  - Due to some fatigue we discussed we will plan to cut back Ibrance to 100 mg to see if this helps with the fatigue.  - Will plan to continue Zometa for 3-5 years every 6 months.  - Next CT c/a/p  and bone scan in 6 months.  - In the future we can try and get her scans and Zometa on schedule together.  - RTC in 3 with me for Zometa and in 6 months for scans.      3/7/25:   - No evidence of clear disease progression on imaging   - She does have some densities on the kidneys that are enlarging, though most consistent with cysts. We discussed we will plan for MRI to follow up.    - She does have some concerns for fatty liver and pre diabetes. We discussed the importance of lifestyle changes; can consider GLP in the future.    - Otherwise, will continue IBRANCE as  scheduled   - RTC for phone visit after MRI   - Likely moving forward with plan for labs in 3 months and scans in 6 months    3/28/25: Virtual visit   - MRI of the kidney shows simple cysts   - No other new symptoms   - We will continue surveillance   - No change in Ibrance, refill provided   - RTC in 3 months with labs, 6 months with Dr. Kumar      Reviewed ongoing medical problems and how they relate to his malignancy, will continue long term monitoring.    RTC in 3 months with labs, 6 months with Dr. Kumar  This note has been transcribed using a medical scribe and there is a possibility of unintentional typing misprints       Diagnoses and all orders for this visit:  Malignant neoplasm of overlapping sites of left breast in female, estrogen receptor positive  -     NM PET CT bone skull base to mid thigh; Future  -     CBC and Auto Differential; Future  -     Comprehensive metabolic panel; Future  -     signatera; Other-indicate in comment; unknown - Miscellaneous Test; Future  -     Cancer Antigen 27-29; Future  -     palbociclib (Ibrance) 100 mg tablet; Take 1 tablet (100 mg) by mouth once daily for 21 days, then off for 7 days  -     Clinic Appointment Request; Future  -     Clinic Appointment Request [facility]; Future  -     CBC and Auto Differential; Future  -     Comprehensive Metabolic Panel; Future  -     signatera; Other-indicate in comment; unknown - Miscellaneous Test; Future  -     Cancer Antigen 27-29; Future    Sindhu Kumar MD  Hematology/Oncology  [facility]      Scribe Attestation  By signing my name below, ITanisha Scribe, attest that this documentation has been prepared under the direction and in the presence of Sindhu Kumar MD.      Time Spent  Prep time on day of patient encounter:   Time spent directly with patient, family or caregiver:   Additional Time Spent on Patient Care Activities:   Documentation Time:   Other Time Spent:   Total:

## 2025-04-01 ENCOUNTER — APPOINTMENT (OUTPATIENT)
Dept: RADIOLOGY | Facility: HOSPITAL | Age: 45
End: 2025-04-01
Payer: COMMERCIAL

## 2025-04-01 ENCOUNTER — ANESTHESIA EVENT (OUTPATIENT)
Dept: OPERATING ROOM | Facility: HOSPITAL | Age: 45
End: 2025-04-01
Payer: COMMERCIAL

## 2025-04-01 ENCOUNTER — HOSPITAL ENCOUNTER (OUTPATIENT)
Facility: HOSPITAL | Age: 45
Setting detail: OBSERVATION
Discharge: HOME | End: 2025-04-01
Attending: EMERGENCY MEDICINE | Admitting: SURGERY
Payer: COMMERCIAL

## 2025-04-01 ENCOUNTER — ANESTHESIA (OUTPATIENT)
Dept: OPERATING ROOM | Facility: HOSPITAL | Age: 45
End: 2025-04-01
Payer: COMMERCIAL

## 2025-04-01 ENCOUNTER — PHARMACY VISIT (OUTPATIENT)
Dept: PHARMACY | Facility: CLINIC | Age: 45
End: 2025-04-01
Payer: COMMERCIAL

## 2025-04-01 VITALS
OXYGEN SATURATION: 90 % | TEMPERATURE: 98.4 F | BODY MASS INDEX: 30.21 KG/M2 | HEART RATE: 97 BPM | HEIGHT: 61 IN | RESPIRATION RATE: 20 BRPM | SYSTOLIC BLOOD PRESSURE: 117 MMHG | WEIGHT: 160 LBS | DIASTOLIC BLOOD PRESSURE: 61 MMHG

## 2025-04-01 DIAGNOSIS — K35.30 ACUTE APPENDICITIS WITH LOCALIZED PERITONITIS, WITHOUT PERFORATION OR ABSCESS, UNSPECIFIED WHETHER GANGRENE PRESENT: Primary | ICD-10-CM

## 2025-04-01 DIAGNOSIS — G89.18 POST-OP PAIN: ICD-10-CM

## 2025-04-01 PROBLEM — K35.80 ACUTE APPENDICITIS: Status: ACTIVE | Noted: 2025-04-01

## 2025-04-01 LAB
ABO GROUP (TYPE) IN BLOOD: NORMAL
ALBUMIN SERPL BCP-MCNC: 4.6 G/DL (ref 3.4–5)
ALP SERPL-CCNC: 59 U/L (ref 33–110)
ALT SERPL W P-5'-P-CCNC: 35 U/L (ref 7–45)
ANION GAP SERPL CALC-SCNC: 14 MMOL/L (ref 10–20)
ANION GAP SERPL CALC-SCNC: 14 MMOL/L (ref 10–20)
ANTIBODY SCREEN: NORMAL
APPEARANCE UR: CLEAR
AST SERPL W P-5'-P-CCNC: 24 U/L (ref 9–39)
BASOPHILS # BLD AUTO: 0.03 X10*3/UL (ref 0–0.1)
BASOPHILS NFR BLD AUTO: 0.6 %
BILIRUB SERPL-MCNC: 0.4 MG/DL (ref 0–1.2)
BILIRUB UR STRIP.AUTO-MCNC: NEGATIVE MG/DL
BUN SERPL-MCNC: 16 MG/DL (ref 6–23)
BUN SERPL-MCNC: 19 MG/DL (ref 6–23)
CALCIUM SERPL-MCNC: 10.2 MG/DL (ref 8.6–10.3)
CALCIUM SERPL-MCNC: 9.2 MG/DL (ref 8.6–10.3)
CHLORIDE SERPL-SCNC: 102 MMOL/L (ref 98–107)
CHLORIDE SERPL-SCNC: 102 MMOL/L (ref 98–107)
CO2 SERPL-SCNC: 25 MMOL/L (ref 21–32)
CO2 SERPL-SCNC: 27 MMOL/L (ref 21–32)
COLOR UR: ABNORMAL
CREAT SERPL-MCNC: 0.69 MG/DL (ref 0.5–1.05)
CREAT SERPL-MCNC: 0.76 MG/DL (ref 0.5–1.05)
EGFRCR SERPLBLD CKD-EPI 2021: >90 ML/MIN/1.73M*2
EGFRCR SERPLBLD CKD-EPI 2021: >90 ML/MIN/1.73M*2
EOSINOPHIL # BLD AUTO: 0.07 X10*3/UL (ref 0–0.7)
EOSINOPHIL NFR BLD AUTO: 1.5 %
ERYTHROCYTE [DISTWIDTH] IN BLOOD BY AUTOMATED COUNT: 13.1 % (ref 11.5–14.5)
ERYTHROCYTE [DISTWIDTH] IN BLOOD BY AUTOMATED COUNT: 13.1 % (ref 11.5–14.5)
GLUCOSE SERPL-MCNC: 112 MG/DL (ref 74–99)
GLUCOSE SERPL-MCNC: 139 MG/DL (ref 74–99)
GLUCOSE UR STRIP.AUTO-MCNC: NORMAL MG/DL
HCT VFR BLD AUTO: 33.4 % (ref 36–46)
HCT VFR BLD AUTO: 34.2 % (ref 36–46)
HGB BLD-MCNC: 11.4 G/DL (ref 12–16)
HGB BLD-MCNC: 11.8 G/DL (ref 12–16)
HOLD SPECIMEN: NORMAL
IMM GRANULOCYTES # BLD AUTO: 0.02 X10*3/UL (ref 0–0.7)
IMM GRANULOCYTES NFR BLD AUTO: 0.4 % (ref 0–0.9)
KETONES UR STRIP.AUTO-MCNC: NEGATIVE MG/DL
LEUKOCYTE ESTERASE UR QL STRIP.AUTO: ABNORMAL
LYMPHOCYTES # BLD AUTO: 0.83 X10*3/UL (ref 1.2–4.8)
LYMPHOCYTES NFR BLD AUTO: 17.5 %
MAGNESIUM SERPL-MCNC: 1.89 MG/DL (ref 1.6–2.4)
MCH RBC QN AUTO: 34.4 PG (ref 26–34)
MCH RBC QN AUTO: 34.8 PG (ref 26–34)
MCHC RBC AUTO-ENTMCNC: 34.1 G/DL (ref 32–36)
MCHC RBC AUTO-ENTMCNC: 34.5 G/DL (ref 32–36)
MCV RBC AUTO: 101 FL (ref 80–100)
MCV RBC AUTO: 101 FL (ref 80–100)
MONOCYTES # BLD AUTO: 0.42 X10*3/UL (ref 0.1–1)
MONOCYTES NFR BLD AUTO: 8.9 %
MUCOUS THREADS #/AREA URNS AUTO: ABNORMAL /LPF
NEUTROPHILS # BLD AUTO: 3.37 X10*3/UL (ref 1.2–7.7)
NEUTROPHILS NFR BLD AUTO: 71.1 %
NITRITE UR QL STRIP.AUTO: NEGATIVE
NRBC BLD-RTO: 0 /100 WBCS (ref 0–0)
NRBC BLD-RTO: 0 /100 WBCS (ref 0–0)
PH UR STRIP.AUTO: 5.5 [PH]
PLATELET # BLD AUTO: 227 X10*3/UL (ref 150–450)
PLATELET # BLD AUTO: 239 X10*3/UL (ref 150–450)
POTASSIUM SERPL-SCNC: 4 MMOL/L (ref 3.5–5.3)
POTASSIUM SERPL-SCNC: 4 MMOL/L (ref 3.5–5.3)
PROT SERPL-MCNC: 7.5 G/DL (ref 6.4–8.2)
PROT UR STRIP.AUTO-MCNC: NEGATIVE MG/DL
RBC # BLD AUTO: 3.31 X10*6/UL (ref 4–5.2)
RBC # BLD AUTO: 3.39 X10*6/UL (ref 4–5.2)
RBC # UR STRIP.AUTO: ABNORMAL MG/DL
RBC #/AREA URNS AUTO: ABNORMAL /HPF
RH FACTOR (ANTIGEN D): NORMAL
SODIUM SERPL-SCNC: 137 MMOL/L (ref 136–145)
SODIUM SERPL-SCNC: 139 MMOL/L (ref 136–145)
SP GR UR STRIP.AUTO: 1.02
UROBILINOGEN UR STRIP.AUTO-MCNC: NORMAL MG/DL
WBC # BLD AUTO: 4 X10*3/UL (ref 4.4–11.3)
WBC # BLD AUTO: 4.7 X10*3/UL (ref 4.4–11.3)
WBC #/AREA URNS AUTO: ABNORMAL /HPF

## 2025-04-01 PROCEDURE — 2550000001 HC RX 255 CONTRASTS: Performed by: EMERGENCY MEDICINE

## 2025-04-01 PROCEDURE — 7100000001 HC RECOVERY ROOM TIME - INITIAL BASE CHARGE: Performed by: SURGERY

## 2025-04-01 PROCEDURE — G0378 HOSPITAL OBSERVATION PER HR: HCPCS

## 2025-04-01 PROCEDURE — 3700000001 HC GENERAL ANESTHESIA TIME - INITIAL BASE CHARGE: Performed by: SURGERY

## 2025-04-01 PROCEDURE — 83735 ASSAY OF MAGNESIUM: CPT

## 2025-04-01 PROCEDURE — 2500000005 HC RX 250 GENERAL PHARMACY W/O HCPCS: Performed by: STUDENT IN AN ORGANIZED HEALTH CARE EDUCATION/TRAINING PROGRAM

## 2025-04-01 PROCEDURE — RXMED WILLOW AMBULATORY MEDICATION CHARGE

## 2025-04-01 PROCEDURE — 2500000004 HC RX 250 GENERAL PHARMACY W/ HCPCS (ALT 636 FOR OP/ED): Performed by: EMERGENCY MEDICINE

## 2025-04-01 PROCEDURE — 86901 BLOOD TYPING SEROLOGIC RH(D): CPT

## 2025-04-01 PROCEDURE — 2500000004 HC RX 250 GENERAL PHARMACY W/ HCPCS (ALT 636 FOR OP/ED)

## 2025-04-01 PROCEDURE — 3600000003 HC OR TIME - INITIAL BASE CHARGE - PROCEDURE LEVEL THREE: Performed by: SURGERY

## 2025-04-01 PROCEDURE — 99285 EMERGENCY DEPT VISIT HI MDM: CPT | Mod: 25 | Performed by: EMERGENCY MEDICINE

## 2025-04-01 PROCEDURE — 96361 HYDRATE IV INFUSION ADD-ON: CPT | Mod: 59

## 2025-04-01 PROCEDURE — 2720000007 HC OR 272 NO HCPCS: Performed by: SURGERY

## 2025-04-01 PROCEDURE — 74177 CT ABD & PELVIS W/CONTRAST: CPT

## 2025-04-01 PROCEDURE — 88304 TISSUE EXAM BY PATHOLOGIST: CPT | Mod: TC,STJLAB | Performed by: SURGERY

## 2025-04-01 PROCEDURE — 74177 CT ABD & PELVIS W/CONTRAST: CPT | Performed by: SURGERY

## 2025-04-01 PROCEDURE — 99285 EMERGENCY DEPT VISIT HI MDM: CPT | Performed by: EMERGENCY MEDICINE

## 2025-04-01 PROCEDURE — 2500000005 HC RX 250 GENERAL PHARMACY W/O HCPCS: Performed by: ANESTHESIOLOGIST ASSISTANT

## 2025-04-01 PROCEDURE — 2500000004 HC RX 250 GENERAL PHARMACY W/ HCPCS (ALT 636 FOR OP/ED): Performed by: ANESTHESIOLOGIST ASSISTANT

## 2025-04-01 PROCEDURE — 80048 BASIC METABOLIC PNL TOTAL CA: CPT | Mod: CCI

## 2025-04-01 PROCEDURE — 99221 1ST HOSP IP/OBS SF/LOW 40: CPT

## 2025-04-01 PROCEDURE — 1100000001 HC PRIVATE ROOM DAILY

## 2025-04-01 PROCEDURE — 80053 COMPREHEN METABOLIC PANEL: CPT | Performed by: EMERGENCY MEDICINE

## 2025-04-01 PROCEDURE — 3700000002 HC GENERAL ANESTHESIA TIME - EACH INCREMENTAL 1 MINUTE: Performed by: SURGERY

## 2025-04-01 PROCEDURE — 81001 URINALYSIS AUTO W/SCOPE: CPT | Performed by: EMERGENCY MEDICINE

## 2025-04-01 PROCEDURE — 87086 URINE CULTURE/COLONY COUNT: CPT | Mod: STJLAB | Performed by: EMERGENCY MEDICINE

## 2025-04-01 PROCEDURE — 44970 LAPAROSCOPY APPENDECTOMY: CPT | Performed by: SURGERY

## 2025-04-01 PROCEDURE — 85025 COMPLETE CBC W/AUTO DIFF WBC: CPT | Performed by: EMERGENCY MEDICINE

## 2025-04-01 PROCEDURE — 36415 COLL VENOUS BLD VENIPUNCTURE: CPT | Performed by: EMERGENCY MEDICINE

## 2025-04-01 PROCEDURE — 96365 THER/PROPH/DIAG IV INF INIT: CPT | Mod: 59

## 2025-04-01 PROCEDURE — 7100000002 HC RECOVERY ROOM TIME - EACH INCREMENTAL 1 MINUTE: Performed by: SURGERY

## 2025-04-01 PROCEDURE — 86900 BLOOD TYPING SEROLOGIC ABO: CPT

## 2025-04-01 PROCEDURE — 85027 COMPLETE CBC AUTOMATED: CPT

## 2025-04-01 PROCEDURE — 36415 COLL VENOUS BLD VENIPUNCTURE: CPT

## 2025-04-01 PROCEDURE — 2500000005 HC RX 250 GENERAL PHARMACY W/O HCPCS: Performed by: SURGERY

## 2025-04-01 PROCEDURE — 3600000008 HC OR TIME - EACH INCREMENTAL 1 MINUTE - PROCEDURE LEVEL THREE: Performed by: SURGERY

## 2025-04-01 RX ORDER — SODIUM CHLORIDE, SODIUM LACTATE, POTASSIUM CHLORIDE, CALCIUM CHLORIDE 600; 310; 30; 20 MG/100ML; MG/100ML; MG/100ML; MG/100ML
125 INJECTION, SOLUTION INTRAVENOUS CONTINUOUS
Status: DISCONTINUED | OUTPATIENT
Start: 2025-04-01 | End: 2025-04-01 | Stop reason: HOSPADM

## 2025-04-01 RX ORDER — FENTANYL CITRATE 50 UG/ML
INJECTION, SOLUTION INTRAMUSCULAR; INTRAVENOUS AS NEEDED
Status: DISCONTINUED | OUTPATIENT
Start: 2025-04-01 | End: 2025-04-01

## 2025-04-01 RX ORDER — PROPOFOL 10 MG/ML
INJECTION, EMULSION INTRAVENOUS AS NEEDED
Status: DISCONTINUED | OUTPATIENT
Start: 2025-04-01 | End: 2025-04-01

## 2025-04-01 RX ORDER — ALBUTEROL SULFATE 0.83 MG/ML
2.5 SOLUTION RESPIRATORY (INHALATION) ONCE AS NEEDED
Status: DISCONTINUED | OUTPATIENT
Start: 2025-04-01 | End: 2025-04-01 | Stop reason: HOSPADM

## 2025-04-01 RX ORDER — LABETALOL HYDROCHLORIDE 5 MG/ML
5 INJECTION, SOLUTION INTRAVENOUS ONCE AS NEEDED
Status: DISCONTINUED | OUTPATIENT
Start: 2025-04-01 | End: 2025-04-01 | Stop reason: HOSPADM

## 2025-04-01 RX ORDER — MIDAZOLAM HYDROCHLORIDE 1 MG/ML
INJECTION, SOLUTION INTRAMUSCULAR; INTRAVENOUS AS NEEDED
Status: DISCONTINUED | OUTPATIENT
Start: 2025-04-01 | End: 2025-04-01

## 2025-04-01 RX ORDER — MORPHINE SULFATE 2 MG/ML
2 INJECTION, SOLUTION INTRAMUSCULAR; INTRAVENOUS EVERY 4 HOURS PRN
Status: DISCONTINUED | OUTPATIENT
Start: 2025-04-01 | End: 2025-04-01 | Stop reason: HOSPADM

## 2025-04-01 RX ORDER — SODIUM CHLORIDE, SODIUM LACTATE, POTASSIUM CHLORIDE, CALCIUM CHLORIDE 600; 310; 30; 20 MG/100ML; MG/100ML; MG/100ML; MG/100ML
100 INJECTION, SOLUTION INTRAVENOUS CONTINUOUS
Status: DISCONTINUED | OUTPATIENT
Start: 2025-04-01 | End: 2025-04-01 | Stop reason: HOSPADM

## 2025-04-01 RX ORDER — ROCURONIUM BROMIDE 50 MG/5 ML
SYRINGE (ML) INTRAVENOUS AS NEEDED
Status: DISCONTINUED | OUTPATIENT
Start: 2025-04-01 | End: 2025-04-01

## 2025-04-01 RX ORDER — ONDANSETRON HYDROCHLORIDE 2 MG/ML
4 INJECTION, SOLUTION INTRAVENOUS EVERY 8 HOURS PRN
Status: DISCONTINUED | OUTPATIENT
Start: 2025-04-01 | End: 2025-04-01 | Stop reason: HOSPADM

## 2025-04-01 RX ORDER — DEXAMETHASONE SODIUM PHOSPHATE 10 MG/ML
INJECTION INTRAMUSCULAR; INTRAVENOUS AS NEEDED
Status: DISCONTINUED | OUTPATIENT
Start: 2025-04-01 | End: 2025-04-01

## 2025-04-01 RX ORDER — LIDOCAINE HYDROCHLORIDE 20 MG/ML
INJECTION, SOLUTION EPIDURAL; INFILTRATION; INTRACAUDAL; PERINEURAL AS NEEDED
Status: DISCONTINUED | OUTPATIENT
Start: 2025-04-01 | End: 2025-04-01

## 2025-04-01 RX ORDER — MEPERIDINE HYDROCHLORIDE 50 MG/ML
12.5 INJECTION INTRAMUSCULAR; INTRAVENOUS; SUBCUTANEOUS EVERY 10 MIN PRN
Status: DISCONTINUED | OUTPATIENT
Start: 2025-04-01 | End: 2025-04-01 | Stop reason: HOSPADM

## 2025-04-01 RX ORDER — PSYLLIUM HUSK 0.4 G
1 CAPSULE ORAL DAILY
COMMUNITY

## 2025-04-01 RX ORDER — DOCUSATE SODIUM 100 MG/1
200 CAPSULE, LIQUID FILLED ORAL DAILY
Qty: 30 CAPSULE | Refills: 0 | Status: SHIPPED | OUTPATIENT
Start: 2025-04-01 | End: 2025-04-16

## 2025-04-01 RX ORDER — HYDROMORPHONE HYDROCHLORIDE 0.2 MG/ML
0.2 INJECTION INTRAMUSCULAR; INTRAVENOUS; SUBCUTANEOUS
Status: DISCONTINUED | OUTPATIENT
Start: 2025-04-01 | End: 2025-04-01 | Stop reason: HOSPADM

## 2025-04-01 RX ORDER — LIDOCAINE HYDROCHLORIDE 10 MG/ML
0.1 INJECTION, SOLUTION INFILTRATION; PERINEURAL ONCE
Status: CANCELLED | OUTPATIENT
Start: 2025-04-01 | End: 2025-04-01

## 2025-04-01 RX ORDER — HYDROMORPHONE HYDROCHLORIDE 1 MG/ML
INJECTION, SOLUTION INTRAMUSCULAR; INTRAVENOUS; SUBCUTANEOUS AS NEEDED
Status: DISCONTINUED | OUTPATIENT
Start: 2025-04-01 | End: 2025-04-01

## 2025-04-01 RX ORDER — BLOOD PRESSURE TEST KIT-WRIST
1 KIT MISCELLANEOUS DAILY
COMMUNITY

## 2025-04-01 RX ORDER — BISMUTH SUBSALICYLATE 262 MG
1 TABLET,CHEWABLE ORAL DAILY
COMMUNITY

## 2025-04-01 RX ORDER — MIDAZOLAM HYDROCHLORIDE 1 MG/ML
1 INJECTION, SOLUTION INTRAMUSCULAR; INTRAVENOUS ONCE AS NEEDED
Status: DISCONTINUED | OUTPATIENT
Start: 2025-04-01 | End: 2025-04-01 | Stop reason: HOSPADM

## 2025-04-01 RX ORDER — FENTANYL CITRATE 50 UG/ML
25 INJECTION, SOLUTION INTRAMUSCULAR; INTRAVENOUS EVERY 5 MIN PRN
Status: DISCONTINUED | OUTPATIENT
Start: 2025-04-01 | End: 2025-04-01 | Stop reason: HOSPADM

## 2025-04-01 RX ORDER — L. ACIDOPHILUS/L.BULGARICUS 1MM CELL
1 TABLET ORAL DAILY
COMMUNITY

## 2025-04-01 RX ORDER — MORPHINE SULFATE 2 MG/ML
1 INJECTION, SOLUTION INTRAMUSCULAR; INTRAVENOUS EVERY 4 HOURS PRN
Status: DISCONTINUED | OUTPATIENT
Start: 2025-04-01 | End: 2025-04-01 | Stop reason: HOSPADM

## 2025-04-01 RX ORDER — ONDANSETRON HYDROCHLORIDE 2 MG/ML
4 INJECTION, SOLUTION INTRAVENOUS ONCE AS NEEDED
Status: DISCONTINUED | OUTPATIENT
Start: 2025-04-01 | End: 2025-04-01 | Stop reason: HOSPADM

## 2025-04-01 RX ORDER — OXYCODONE HYDROCHLORIDE 5 MG/1
5 TABLET ORAL EVERY 6 HOURS PRN
Qty: 5 TABLET | Refills: 0 | Status: SHIPPED | OUTPATIENT
Start: 2025-04-01

## 2025-04-01 RX ORDER — LANOLIN ALCOHOL/MO/W.PET/CERES
1000 CREAM (GRAM) TOPICAL DAILY
COMMUNITY

## 2025-04-01 RX ORDER — OXYCODONE HYDROCHLORIDE 5 MG/1
5 TABLET ORAL EVERY 4 HOURS PRN
Status: DISCONTINUED | OUTPATIENT
Start: 2025-04-01 | End: 2025-04-01 | Stop reason: HOSPADM

## 2025-04-01 RX ORDER — ENOXAPARIN SODIUM 100 MG/ML
40 INJECTION SUBCUTANEOUS EVERY 24 HOURS
Status: DISCONTINUED | OUTPATIENT
Start: 2025-04-01 | End: 2025-04-01 | Stop reason: HOSPADM

## 2025-04-01 RX ORDER — ONDANSETRON HYDROCHLORIDE 2 MG/ML
INJECTION, SOLUTION INTRAVENOUS AS NEEDED
Status: DISCONTINUED | OUTPATIENT
Start: 2025-04-01 | End: 2025-04-01

## 2025-04-01 RX ORDER — MORPHINE SULFATE 4 MG/ML
4 INJECTION, SOLUTION INTRAMUSCULAR; INTRAVENOUS EVERY 4 HOURS PRN
Status: DISCONTINUED | OUTPATIENT
Start: 2025-04-01 | End: 2025-04-01 | Stop reason: HOSPADM

## 2025-04-01 RX ORDER — BUPIVACAINE HYDROCHLORIDE AND EPINEPHRINE 5; 5 MG/ML; UG/ML
INJECTION, SOLUTION EPIDURAL; INTRACAUDAL; PERINEURAL AS NEEDED
Status: DISCONTINUED | OUTPATIENT
Start: 2025-04-01 | End: 2025-04-01 | Stop reason: HOSPADM

## 2025-04-01 RX ADMIN — ONDANSETRON 4 MG: 2 INJECTION, SOLUTION INTRAMUSCULAR; INTRAVENOUS at 10:26

## 2025-04-01 RX ADMIN — IOHEXOL 75 ML: 350 INJECTION, SOLUTION INTRAVENOUS at 02:12

## 2025-04-01 RX ADMIN — SODIUM CHLORIDE, SODIUM LACTATE, POTASSIUM CHLORIDE, AND CALCIUM CHLORIDE 125 ML/HR: .6; .31; .03; .02 INJECTION, SOLUTION INTRAVENOUS at 04:11

## 2025-04-01 RX ADMIN — FENTANYL CITRATE 100 MCG: 50 INJECTION, SOLUTION INTRAMUSCULAR; INTRAVENOUS at 09:54

## 2025-04-01 RX ADMIN — Medication 2 L/MIN: at 11:02

## 2025-04-01 RX ADMIN — DEXAMETHASONE SODIUM PHOSPHATE 10 MG: 10 INJECTION INTRAMUSCULAR; INTRAVENOUS at 09:54

## 2025-04-01 RX ADMIN — TAZOBACTAM SODIUM AND PIPERACILLIN SODIUM 3.38 G: 375; 3 INJECTION, SOLUTION INTRAVENOUS at 10:02

## 2025-04-01 RX ADMIN — PROPOFOL 200 MG: 10 INJECTION, EMULSION INTRAVENOUS at 09:54

## 2025-04-01 RX ADMIN — SODIUM CHLORIDE, SODIUM LACTATE, POTASSIUM CHLORIDE, AND CALCIUM CHLORIDE 1000 ML: .6; .31; .03; .02 INJECTION, SOLUTION INTRAVENOUS at 01:46

## 2025-04-01 RX ADMIN — LIDOCAINE HYDROCHLORIDE 80 MG: 20 INJECTION, SOLUTION EPIDURAL; INFILTRATION; INTRACAUDAL; PERINEURAL at 09:54

## 2025-04-01 RX ADMIN — MIDAZOLAM 2 MG: 1 INJECTION INTRAMUSCULAR; INTRAVENOUS at 09:51

## 2025-04-01 RX ADMIN — PIPERACILLIN SODIUM AND TAZOBACTAM SODIUM 4.5 G: 4; .5 INJECTION, SOLUTION INTRAVENOUS at 02:49

## 2025-04-01 RX ADMIN — Medication 50 MG: at 09:54

## 2025-04-01 RX ADMIN — HYDROMORPHONE HYDROCHLORIDE 1 MG: 1 INJECTION, SOLUTION INTRAMUSCULAR; INTRAVENOUS; SUBCUTANEOUS at 10:26

## 2025-04-01 RX ADMIN — SUGAMMADEX 200 MG: 100 INJECTION, SOLUTION INTRAVENOUS at 10:37

## 2025-04-01 SDOH — ECONOMIC STABILITY: INCOME INSECURITY: IN THE PAST 12 MONTHS HAS THE ELECTRIC, GAS, OIL, OR WATER COMPANY THREATENED TO SHUT OFF SERVICES IN YOUR HOME?: NO

## 2025-04-01 SDOH — ECONOMIC STABILITY: FOOD INSECURITY: HOW HARD IS IT FOR YOU TO PAY FOR THE VERY BASICS LIKE FOOD, HOUSING, MEDICAL CARE, AND HEATING?: NOT HARD AT ALL

## 2025-04-01 SDOH — SOCIAL STABILITY: SOCIAL INSECURITY: ARE THERE ANY APPARENT SIGNS OF INJURIES/BEHAVIORS THAT COULD BE RELATED TO ABUSE/NEGLECT?: NO

## 2025-04-01 SDOH — ECONOMIC STABILITY: FOOD INSECURITY: WITHIN THE PAST 12 MONTHS, THE FOOD YOU BOUGHT JUST DIDN'T LAST AND YOU DIDN'T HAVE MONEY TO GET MORE.: NEVER TRUE

## 2025-04-01 SDOH — SOCIAL STABILITY: SOCIAL INSECURITY
WITHIN THE LAST YEAR, HAVE YOU BEEN KICKED, HIT, SLAPPED, OR OTHERWISE PHYSICALLY HURT BY YOUR PARTNER OR EX-PARTNER?: NO

## 2025-04-01 SDOH — HEALTH STABILITY: MENTAL HEALTH: HOW OFTEN DO YOU HAVE SIX OR MORE DRINKS ON ONE OCCASION?: NEVER

## 2025-04-01 SDOH — SOCIAL STABILITY: SOCIAL INSECURITY: DO YOU FEEL UNSAFE GOING BACK TO THE PLACE WHERE YOU ARE LIVING?: NO

## 2025-04-01 SDOH — HEALTH STABILITY: MENTAL HEALTH: HOW MANY DRINKS CONTAINING ALCOHOL DO YOU HAVE ON A TYPICAL DAY WHEN YOU ARE DRINKING?: 1 OR 2

## 2025-04-01 SDOH — SOCIAL STABILITY: SOCIAL INSECURITY: HAVE YOU HAD ANY THOUGHTS OF HARMING ANYONE ELSE?: NO

## 2025-04-01 SDOH — ECONOMIC STABILITY: FOOD INSECURITY: WITHIN THE PAST 12 MONTHS, YOU WORRIED THAT YOUR FOOD WOULD RUN OUT BEFORE YOU GOT THE MONEY TO BUY MORE.: NEVER TRUE

## 2025-04-01 SDOH — ECONOMIC STABILITY: HOUSING INSECURITY: IN THE LAST 12 MONTHS, WAS THERE A TIME WHEN YOU WERE NOT ABLE TO PAY THE MORTGAGE OR RENT ON TIME?: NO

## 2025-04-01 SDOH — HEALTH STABILITY: MENTAL HEALTH: HOW OFTEN DO YOU HAVE A DRINK CONTAINING ALCOHOL?: 2-4 TIMES A MONTH

## 2025-04-01 SDOH — SOCIAL STABILITY: SOCIAL INSECURITY
WITHIN THE LAST YEAR, HAVE YOU BEEN RAPED OR FORCED TO HAVE ANY KIND OF SEXUAL ACTIVITY BY YOUR PARTNER OR EX-PARTNER?: NO

## 2025-04-01 SDOH — ECONOMIC STABILITY: HOUSING INSECURITY: AT ANY TIME IN THE PAST 12 MONTHS, WERE YOU HOMELESS OR LIVING IN A SHELTER (INCLUDING NOW)?: NO

## 2025-04-01 SDOH — SOCIAL STABILITY: SOCIAL INSECURITY: WITHIN THE LAST YEAR, HAVE YOU BEEN HUMILIATED OR EMOTIONALLY ABUSED IN OTHER WAYS BY YOUR PARTNER OR EX-PARTNER?: NO

## 2025-04-01 SDOH — ECONOMIC STABILITY: HOUSING INSECURITY: IN THE PAST 12 MONTHS, HOW MANY TIMES HAVE YOU MOVED WHERE YOU WERE LIVING?: 0

## 2025-04-01 SDOH — SOCIAL STABILITY: SOCIAL INSECURITY: HAVE YOU HAD THOUGHTS OF HARMING ANYONE ELSE?: NO

## 2025-04-01 SDOH — HEALTH STABILITY: MENTAL HEALTH: CURRENT SMOKER: 0

## 2025-04-01 SDOH — SOCIAL STABILITY: SOCIAL INSECURITY: HAS ANYONE EVER THREATENED TO HURT YOUR FAMILY OR YOUR PETS?: NO

## 2025-04-01 SDOH — SOCIAL STABILITY: SOCIAL INSECURITY: WITHIN THE LAST YEAR, HAVE YOU BEEN AFRAID OF YOUR PARTNER OR EX-PARTNER?: NO

## 2025-04-01 SDOH — SOCIAL STABILITY: SOCIAL INSECURITY: DO YOU FEEL ANYONE HAS EXPLOITED OR TAKEN ADVANTAGE OF YOU FINANCIALLY OR OF YOUR PERSONAL PROPERTY?: NO

## 2025-04-01 SDOH — SOCIAL STABILITY: SOCIAL INSECURITY: ABUSE: ADULT

## 2025-04-01 SDOH — ECONOMIC STABILITY: TRANSPORTATION INSECURITY: IN THE PAST 12 MONTHS, HAS LACK OF TRANSPORTATION KEPT YOU FROM MEDICAL APPOINTMENTS OR FROM GETTING MEDICATIONS?: NO

## 2025-04-01 SDOH — SOCIAL STABILITY: SOCIAL INSECURITY: ARE YOU OR HAVE YOU BEEN THREATENED OR ABUSED PHYSICALLY, EMOTIONALLY, OR SEXUALLY BY ANYONE?: NO

## 2025-04-01 SDOH — SOCIAL STABILITY: SOCIAL INSECURITY: DOES ANYONE TRY TO KEEP YOU FROM HAVING/CONTACTING OTHER FRIENDS OR DOING THINGS OUTSIDE YOUR HOME?: NO

## 2025-04-01 ASSESSMENT — PAIN SCALES - GENERAL
PAINLEVEL_OUTOF10: 2
PAINLEVEL_OUTOF10: 7
PAINLEVEL_OUTOF10: 2
PAINLEVEL_OUTOF10: 2

## 2025-04-01 ASSESSMENT — COGNITIVE AND FUNCTIONAL STATUS - GENERAL
DAILY ACTIVITIY SCORE: 24
DAILY ACTIVITIY SCORE: 24
PATIENT BASELINE BEDBOUND: NO
MOBILITY SCORE: 24
MOBILITY SCORE: 24

## 2025-04-01 ASSESSMENT — ACTIVITIES OF DAILY LIVING (ADL)
WALKS IN HOME: INDEPENDENT
DRESSING YOURSELF: INDEPENDENT
HEARING - LEFT EAR: FUNCTIONAL
TOILETING: INDEPENDENT
BATHING: INDEPENDENT
HEARING - RIGHT EAR: FUNCTIONAL
GROOMING: INDEPENDENT
ADEQUATE_TO_COMPLETE_ADL: NO
FEEDING YOURSELF: INDEPENDENT
PATIENT'S MEMORY ADEQUATE TO SAFELY COMPLETE DAILY ACTIVITIES?: NO
JUDGMENT_ADEQUATE_SAFELY_COMPLETE_DAILY_ACTIVITIES: NO
LACK_OF_TRANSPORTATION: NO

## 2025-04-01 ASSESSMENT — LIFESTYLE VARIABLES
AUDIT-C TOTAL SCORE: 2
AUDIT-C TOTAL SCORE: 2
SKIP TO QUESTIONS 9-10: 1
HOW MANY STANDARD DRINKS CONTAINING ALCOHOL DO YOU HAVE ON A TYPICAL DAY: 1 OR 2
AUDIT-C TOTAL SCORE: 2
HOW OFTEN DO YOU HAVE A DRINK CONTAINING ALCOHOL: 2-4 TIMES A MONTH
HOW OFTEN DO YOU HAVE 6 OR MORE DRINKS ON ONE OCCASION: NEVER
SKIP TO QUESTIONS 9-10: 1

## 2025-04-01 ASSESSMENT — ENCOUNTER SYMPTOMS
NERVOUS/ANXIOUS: 0
TROUBLE SWALLOWING: 0
SORE THROAT: 0
LIGHT-HEADEDNESS: 0
BACK PAIN: 0
DYSURIA: 0
AGITATION: 0
PALPITATIONS: 0
HEMATURIA: 0
FLANK PAIN: 0
CHILLS: 0
COUGH: 0
FREQUENCY: 0
VOMITING: 0
WEAKNESS: 0
CHEST TIGHTNESS: 0
DIARRHEA: 0
CONFUSION: 0
COLOR CHANGE: 0
ABDOMINAL PAIN: 1
WOUND: 0
NAUSEA: 0
DIZZINESS: 0
FATIGUE: 0
CONSTIPATION: 0
SHORTNESS OF BREATH: 0
FEVER: 0

## 2025-04-01 ASSESSMENT — PAIN DESCRIPTION - PAIN TYPE: TYPE: ACUTE PAIN

## 2025-04-01 ASSESSMENT — PATIENT HEALTH QUESTIONNAIRE - PHQ9
SUM OF ALL RESPONSES TO PHQ9 QUESTIONS 1 & 2: 0
1. LITTLE INTEREST OR PLEASURE IN DOING THINGS: NOT AT ALL
2. FEELING DOWN, DEPRESSED OR HOPELESS: NOT AT ALL

## 2025-04-01 ASSESSMENT — COLUMBIA-SUICIDE SEVERITY RATING SCALE - C-SSRS
1. IN THE PAST MONTH, HAVE YOU WISHED YOU WERE DEAD OR WISHED YOU COULD GO TO SLEEP AND NOT WAKE UP?: NO
6. HAVE YOU EVER DONE ANYTHING, STARTED TO DO ANYTHING, OR PREPARED TO DO ANYTHING TO END YOUR LIFE?: NO
2. HAVE YOU ACTUALLY HAD ANY THOUGHTS OF KILLING YOURSELF?: NO

## 2025-04-01 ASSESSMENT — PAIN - FUNCTIONAL ASSESSMENT
PAIN_FUNCTIONAL_ASSESSMENT: 0-10

## 2025-04-01 ASSESSMENT — VISUAL ACUITY: OU: 1

## 2025-04-01 ASSESSMENT — PAIN DESCRIPTION - LOCATION: LOCATION: ABDOMEN

## 2025-04-01 ASSESSMENT — PAIN DESCRIPTION - FREQUENCY: FREQUENCY: CONSTANT/CONTINUOUS

## 2025-04-01 ASSESSMENT — PAIN DESCRIPTION - ONSET: ONSET: SUDDEN

## 2025-04-01 ASSESSMENT — PAIN DESCRIPTION - PROGRESSION: CLINICAL_PROGRESSION: NOT CHANGED

## 2025-04-01 ASSESSMENT — PAIN DESCRIPTION - ORIENTATION: ORIENTATION: RIGHT;LOWER

## 2025-04-01 ASSESSMENT — PAIN DESCRIPTION - DESCRIPTORS
DESCRIPTORS: SHARP;STABBING
DESCRIPTORS: DISCOMFORT
DESCRIPTORS: SHARP;STABBING

## 2025-04-01 NOTE — NURSING NOTE
The patient's mom was present for discharge teaching. No questions noted after teaching. Iv was removed. Meds to beds delivered. Patient stated she was feeling itchy all over. The writer notified Dr. Negron and FABIÁN Donaldson. The patient denied SOB and had no hives or rash. Dr. Negron offered the patient benadryl but the patient declined. Dr. Negron said the patient was cleared for discharge and can take benadryl at home if needed.

## 2025-04-01 NOTE — SIGNIFICANT EVENT
Called by the ED to discuss this patient.  This is a 44-year-old female with metastatic breast cancer s/p surgery, chemo/XRT and bilateral oophorectomy and SBRT to liver metastases. She is without evidence of recurrence since July 2023 and is currently on a maintenance chemotherapy.  She presents to the ED with about a day and a half of right lower quadrant pain, tachycardia and hypertension.  On laboratory examination she has no leukocytosis except for seen in the urine but otherwise her labs are at her baseline.  CT scan shows acute appendicitis with appendicolith present.  Patient to be admitted to me, started on Zosyn, kept n.p.o. with mIVF, and will undergo laparoscopic appendectomy in the a.m.

## 2025-04-01 NOTE — PROGRESS NOTES
Spiritual Care Visit  Spiritual Care Request    Reason for Visit:  Routine Visit: Introduction  Continue Visiting: No     Request Received From:       Focus of Care:  Visited With: Patient and family together         Refer to :          Spiritual Care Assessment    Spiritual Assessment:                      Care Provided:  Intended Effects: Establish rapport and connectedness, Lacy affirmation, Build relationship of care and support, Demonstrate caring and concern    Sense of Community and or Muslim Affiliation:  Nondenominational         Addressed Needs/Concerns and/or Geovanna Through:  Muslim Encounters  Muslim Needs: Prayer, Muslim articles  Sacramental Encounters  Communion: Patient wants communion  Communion Given Indicator: Yes    Outcome:  Outcome of Spiritual Care Visit: [place], Affirmation, Peace/gratitude     Advance Directives:         Spiritual Care Annotation    Annotation:  I visited in the morning but she was in surgery, so I returned in the afternoon. Her  Cullen and mother Vilma were present.

## 2025-04-01 NOTE — ANESTHESIA PREPROCEDURE EVALUATION
Patient: Shannon Recio    Procedure Information       Date/Time: 04/01/25 0730    Procedure: APPENDECTOMY, LAPAROSCOPIC    Location: [facility] 08 / [facility]    Surgeons: Leslie Negron MD            Relevant Problems   Cardiac   (+) PVC (premature ventricular contraction)      Pulmonary   (+) MARYA (obstructive sleep apnea)      Neuro   (+) Anxiety   (+) Anxiety and depression      Liver   (+) Breast cancer metastasized to liver, left      HEENT   (+) Mucous retention cyst of maxillary sinus      ID   (+) COVID-19 virus infection   (+) Shingles   (+) Tinea pedis   (+) Wart      Skin   (+) Rash, skin      GYN   (+) Malignant neoplasm of overlapping sites of left breast in female, estrogen receptor positive       Clinical information reviewed:    Allergies  Meds  Problems              NPO Detail:  No data recorded     Physical Exam    Airway  Mallampati: II     Cardiovascular - normal exam  Rhythm: regular  Rate: normal     Dental - normal exam     Pulmonary - normal exam     Abdominal - normal exam  Abdomen: soft             Anesthesia Plan    History of general anesthesia?: yes  History of complications of general anesthesia?: no    ASA 2     general     The patient is not a current smoker.  Patient was previously instructed to abstain from smoking on day of procedure.  Patient did not smoke on day of procedure.  Education provided regarding risk of obstructive sleep apnea.  intravenous induction   Postoperative administration of opioids is intended.  Anesthetic plan and risks discussed with patient.  Use of blood products discussed with patient who.

## 2025-04-01 NOTE — H&P
History Of Present Illness  Shannon Recio is a 44 y.o. female with past medical history significant for L breast cancer s/p bilateral mastectomy with reconstruction now on ibrance therapy, SBRT to liver/regional lymph node and chest well metastases s/p chemo/radiation therapy, anxiety, and vitamin D deficiency, presents to [facility] on 4/1/2025 from home with complaints of right lower quadrant abdominal pain.    Patient states on Sunday evening, she began experiencing RLQ pain/discomfort and associated bloating. Reports that pain has been constant, sharp and stabbing. Initially believed it to be gas pains, but when symptoms did not resolve, she decided to come to ER for further evaluation. She denies any fevers, chills, dizziness, lightheadedness, visual changes, chest pain, shortness of breath, palpitations, nausea, vomiting, diarrhea, or constipation, dysuria, or hematuria.     She denies any tobacco, alcohol, or illicit drug use. Denies any prior history of anesthesia complications. Denies personal or family history of PE/DVT. Has been NPO greater than 8 hours.     ED Course:  Vital signs: Temp. 99.1F,  bpm, RR 16, /81, SPO2 96% on room air   CMP: Glucose 139.  No significant electrolyte abnormalities.  Hepatic and renal function within normal range.  CBC w/diff: No leukocytosis.  H&H stable at 11.8/34.2.  , MCH 34.8  UA: 250 leukocyte esterase, 11-20 WBC (not having urinary symptoms)   CT abdomen pelvis: Appendicolith present of the appendiceal orifice, with mural thickening and submucosal edema involving base of the cecum and appendix with surrounding fat stranding compatible with uncomplicated acute appendicitis.  3 mm nonobstructing right upper pole renal calculus.  Vaguely hypoenhancing known metastatic lesion.  Medications Given: IV Zosyn and 1 L LR fluid bolus  Disposition: Patient admitted for acute appendicitis with plan for OR in the morning.    Past Medical History  Past Medical  History:   Diagnosis Date    Anxiety 08/05/2024    Breast cancer metastasized to liver     Hemorrhoids     MARYA (obstructive sleep apnea) 11/15/2024    Other specified complications of surgical and medical care, not elsewhere classified, sequela 05/26/2022    Fluid collection at surgical site, sequela    Personal history of other infectious and parasitic diseases     History of infection due to human papilloma virus (HPV)    Plantar fasciitis     Vitamin D deficiency 12/08/2023        She has a past medical history of Anxiety (08/05/2024), Breast cancer metastasized to liver, Hemorrhoids, MARYA (obstructive sleep apnea) (11/15/2024), Other specified complications of surgical and medical care, not elsewhere classified, sequela (05/26/2022), Personal history of other infectious and parasitic diseases, Plantar fasciitis, and Vitamin D deficiency (12/08/2023).    Surgical History  She has a past surgical history that includes Tonsillectomy (08/20/2013); Other surgical history (02/04/2022); Other surgical history (06/30/2022); Colposcopy (09/13/2017); Hemorrhoid surgery (09/13/2017); Mouth surgery (02/04/2022); US guided needle liver biopsy (08/01/2022); Oophorectomy (10/2022); and Breast reconstruction (07/31/2023).     Social History  She reports that she has never smoked. She has never used smokeless tobacco. She reports current alcohol use. She reports that she does not currently use drugs.    Family History  Family History   Problem Relation Name Age of Onset    Skin cancer Mother      Dementia Father      Breast cancer Sister          Allergies  Bactrim [sulfamethoxazole-trimethoprim], Sulfa (sulfonamide antibiotics), Ammonium lactate, and Pseudoephedrine    Review of Systems   Constitutional:  Negative for chills, fatigue and fever.   HENT:  Negative for hearing loss, sore throat and trouble swallowing.    Eyes:  Negative for visual disturbance.   Respiratory:  Negative for cough, chest tightness and shortness of  breath.    Cardiovascular:  Negative for chest pain and palpitations.   Gastrointestinal:  Positive for abdominal pain. Negative for constipation, diarrhea, nausea and vomiting.   Genitourinary:  Negative for dysuria, flank pain, frequency, hematuria and urgency.   Musculoskeletal:  Negative for back pain and gait problem.   Skin:  Negative for color change, rash and wound.   Neurological:  Negative for dizziness, syncope, weakness and light-headedness.   Psychiatric/Behavioral:  Negative for agitation and confusion. The patient is not nervous/anxious.         Physical Exam  Vitals reviewed.   Constitutional:       Appearance: Normal appearance. She is not ill-appearing.   HENT:      Head: Normocephalic and atraumatic.      Right Ear: External ear normal.      Left Ear: External ear normal.      Nose: Nose normal.      Mouth/Throat:      Mouth: Mucous membranes are moist.   Eyes:      General: Lids are normal. Vision grossly intact.   Cardiovascular:      Rate and Rhythm: Normal rate and regular rhythm.      Pulses:           Radial pulses are 2+ on the right side and 2+ on the left side.      Heart sounds: S1 normal and S2 normal.   Pulmonary:      Effort: Pulmonary effort is normal. No respiratory distress.      Breath sounds: Normal breath sounds. No wheezing, rhonchi or rales.   Abdominal:      General: Bowel sounds are normal. There is no distension.      Palpations: Abdomen is soft.      Tenderness: There is abdominal tenderness in the right lower quadrant and suprapubic area.   Musculoskeletal:      Right lower leg: No edema.      Left lower leg: No edema.   Skin:     General: Skin is warm and dry.      Capillary Refill: Capillary refill takes less than 2 seconds.   Neurological:      Mental Status: She is alert and oriented to person, place, and time. Mental status is at baseline.      Sensory: Sensation is intact.      Motor: Motor function is intact.   Psychiatric:         Attention and Perception:  "Attention normal.         Mood and Affect: Mood normal.         Speech: Speech normal.         Behavior: Behavior normal. Behavior is cooperative.       Last Recorded Vitals  Blood pressure 147/68, pulse 88, temperature 37.3 °C (99.1 °F), temperature source Temporal, resp. rate 18, height 1.549 m (5' 1\"), weight 72.6 kg (160 lb), SpO2 97%.    Pain Assessment: 0-10  0-10 (Numeric) Pain Score: 7  Pain Type: Acute pain  Pain Location: Abdomen  Pain Orientation: Right; Lower  Pain Descriptors: Sharp; Stabbing  Pain Frequency: Constant/continuous    Relevant Results  Results for orders placed or performed during the hospital encounter of 04/01/25 (from the past 24 hours)   Urinalysis with Reflex Culture and Microscopic   Result Value Ref Range    Color, Urine Light-Yellow Light-Yellow, Yellow, Dark-Yellow    Appearance, Urine Clear Clear    Specific Gravity, Urine 1.022 1.005 - 1.035    pH, Urine 5.5 5.0, 5.5, 6.0, 6.5, 7.0, 7.5, 8.0    Protein, Urine NEGATIVE NEGATIVE, 10 (TRACE), 20 (TRACE) mg/dL    Glucose, Urine Normal Normal mg/dL    Blood, Urine 0.06 (1+) (A) NEGATIVE mg/dL    Ketones, Urine NEGATIVE NEGATIVE mg/dL    Bilirubin, Urine NEGATIVE NEGATIVE mg/dL    Urobilinogen, Urine Normal Normal mg/dL    Nitrite, Urine NEGATIVE NEGATIVE    Leukocyte Esterase, Urine 250 Yeison/uL (A) NEGATIVE   Microscopic Only, Urine   Result Value Ref Range    WBC, Urine 11-20 (A) 1-5, NONE /HPF    RBC, Urine 3-5 NONE, 1-2, 3-5 /HPF    Mucus, Urine FEW Reference range not established. /LPF   CBC with Differential   Result Value Ref Range    WBC 4.7 4.4 - 11.3 x10*3/uL    nRBC 0.0 0.0 - 0.0 /100 WBCs    RBC 3.39 (L) 4.00 - 5.20 x10*6/uL    Hemoglobin 11.8 (L) 12.0 - 16.0 g/dL    Hematocrit 34.2 (L) 36.0 - 46.0 %     (H) 80 - 100 fL    MCH 34.8 (H) 26.0 - 34.0 pg    MCHC 34.5 32.0 - 36.0 g/dL    RDW 13.1 11.5 - 14.5 %    Platelets 239 150 - 450 x10*3/uL    Neutrophils % 71.1 40.0 - 80.0 %    Immature Granulocytes %, Automated 0.4 " 0.0 - 0.9 %    Lymphocytes % 17.5 13.0 - 44.0 %    Monocytes % 8.9 2.0 - 10.0 %    Eosinophils % 1.5 0.0 - 6.0 %    Basophils % 0.6 0.0 - 2.0 %    Neutrophils Absolute 3.37 1.20 - 7.70 x10*3/uL    Immature Granulocytes Absolute, Automated 0.02 0.00 - 0.70 x10*3/uL    Lymphocytes Absolute 0.83 (L) 1.20 - 4.80 x10*3/uL    Monocytes Absolute 0.42 0.10 - 1.00 x10*3/uL    Eosinophils Absolute 0.07 0.00 - 0.70 x10*3/uL    Basophils Absolute 0.03 0.00 - 0.10 x10*3/uL   Comprehensive Metabolic Panel   Result Value Ref Range    Glucose 139 (H) 74 - 99 mg/dL    Sodium 137 136 - 145 mmol/L    Potassium 4.0 3.5 - 5.3 mmol/L    Chloride 102 98 - 107 mmol/L    Bicarbonate 25 21 - 32 mmol/L    Anion Gap 14 10 - 20 mmol/L    Urea Nitrogen 19 6 - 23 mg/dL    Creatinine 0.76 0.50 - 1.05 mg/dL    eGFR >90 >60 mL/min/1.73m*2    Calcium 10.2 8.6 - 10.3 mg/dL    Albumin 4.6 3.4 - 5.0 g/dL    Alkaline Phosphatase 59 33 - 110 U/L    Total Protein 7.5 6.4 - 8.2 g/dL    AST 24 9 - 39 U/L    Bilirubin, Total 0.4 0.0 - 1.2 mg/dL    ALT 35 7 - 45 U/L      Imaging  CT abdomen pelvis w IV contrast    Result Date: 4/1/2025  An appendicolith is present of the appendiceal orifice, with mural thickening and submucosal edema involving the base of the cecum and the appendix, with surrounding fat stranding, compatible with uncomplicated acute appendicitis. Surgical evaluation recommended.   Otherwise, no evidence of acute pathology.   3 mm nonobstructing right upper pole renal calculus.   Similar appearance of vaguely hypoenhancing known metastatic lesion in segment 4A/8. No new focal hepatic lesion.   Additional findings as discussed above.     MACRO: None   Signed by: Germán Montgomery 4/1/2025 2:32 AM Dictation workstation:   [id number]     Cardiology, Vascular, and Other Imaging  No other imaging results found for the past 2 days         Home Medications  Prior to Admission medications    Medication Sig Start Date End Date Taking? Authorizing Provider    American ginseng root (AMERICAN GINSENG ORAL) Take by mouth.    Historical Provider, MD   ashwagandha extract 120 mg capsule Take by mouth.    Historical Provider, MD   busPIRone (Buspar) 15 mg tablet Take 1 tablet (15 mg) by mouth 2 times a day. 8/12/24   Lee Ann Sellers MD   C/sourcherry/celery/grape seed (TART CHERRY ORAL) Take 1 tablet by mouth once daily (M-F).    Historical Provider, MD   calcium carbonate (CALCIUM 600 ORAL) Take 1 tablet by mouth once daily.    Historical Provider, MD   cholecalciferol (Vitamin D3) 50 mcg (2,000 unit) capsule Take 1 capsule (50 mcg) by mouth once daily.    Historical Provider, MD   ciclopirox 1 % shampoo Apply 1 Application topically once daily. To wet scalp; leave in for 5-10 minutes then as needed for flares 9/18/24   Syl Godfrey MD   clindamycin (Cleocin T) 1 % gel Apply topically once daily. 9/18/24 9/18/25  Syl Godfrey MD   docusate sodium (Colace) 100 mg capsule Take 1 capsule (100 mg) by mouth once daily.    Historical Provider, MD   exemestane (Aromasin) 25 mg tablet Take 1 tablet (25 mg total) by mouth once daily.  Take after a meal.  Try to take at the same time each day. 6/3/24   Sindhu Kumar MD   GENERIC EXTERNAL MEDICATION Take 1 tablet by mouth once daily. Probiotic/prebiotic    Historical Provider, MD   GENERIC EXTERNAL MEDICATION Take 1 tablet by mouth once daily. Tumeric    Historical Provider, MD   glucosamine-chondroitin 500-400 mg tablet Take 1 tablet by mouth once daily.    Historical Provider, MD   hydrOXYzine HCL (Atarax) 25 mg tablet Take 1 tablet (25 mg) by mouth every 4 hours if needed for itching. 8/12/24   Lee Ann Selelrs MD   lactase (Lactaid) 3,000 unit tablet Take 1 tablet (3,000 Units) by mouth if needed.    Historical Provider, MD   MULTIVIT-MIN-FERROUS FUMARATE ORAL Take by mouth once daily.    Historical Provider, MD   naproxen sodium (Anaprox) 550 mg tablet Take 1 tablet (550 mg) by mouth if needed. 5/21/15   Historical  Provider, MD   palbociclib (Ibrance) 100 mg tablet Take 1 tablet (100 mg) by mouth once daily for 21 days, then off for 7 days 3/28/25   Sindhu Kumar MD   spironolactone (Aldactone) 50 mg tablet Take 1 tablet (50 mg) by mouth once daily. 6/3/24 6/3/25  Sindhu Kumar MD   SUMAtriptan (Imitrex) 50 mg tablet Take 1 tablet (50 mg) by mouth 1 time if needed for migraine. May repeat dose once in 2 hours if no relief.  Do not exceed 2 doses in 24 hours. 6/27/24   Keenan Ford MD   triamcinolone (Kenalog) 0.1 % cream Apply 1 Application topically 2 times a day.    Historical Provider, MD   venlafaxine XR (Effexor XR) 75 mg 24 hr capsule Take 1 capsule (75 mg) by mouth once daily. Do not crush or chew. 6/3/24 6/3/25  Sindhu Kumar MD   palbociclib (Ibrance) 100 mg tablet Take 1 tablet (100 mg) by mouth once daily for 21 days, then off for 7 days 1/28/25 3/28/25  Sindhu Kumar MD       Medications  Scheduled medications  enoxaparin, 40 mg, subcutaneous, q24h  lactated Ringer's, 1,000 mL, intravenous, Once  piperacillin-tazobactam, 3.375 g, intravenous, q8h      Continuous medications  lactated Ringer's, 125 mL/hr      PRN medications  PRN medications: HYDROmorphone, morphine, morphine, morphine, ondansetron        Assessment/Plan   Assessment & Plan  Acute appendicitis with localized peritonitis, without perforation or abscess, unspecified whether gangrene present  - Plan for laparoscopic appendectomy later in the morning  -N.p.o.  -Given 1 L LR bolus in ER. Another 1L LR fluid bolus to be given followed by maintenance fluids at 125 mL/h continuous  - IV Zosyn  - PRN morphine and dilaudid for pain  - PRN zofran for nausea/vomiting  - AM labs: CBC, BMP, Mg, Type and screen  - Q4H vital signs    Code Status: Full Code     VTE prophylaxis:   Subcutaneous Lovenox  BLE SCDs.  Monitor for s/s of bleeding    Medication reconciliation to be completed when nursing/pharmacy  are able to verify patient's accurate home  medications.    See additional orders for further plan of care. Any further evaluation and management per attending and consulting physicians.      I spent 40 minutes in the professional and overall care of this patient.      TAB [facility]. [place]    This note has been transcribed using Dragon voice recognition system and there is a possibility of unintentional typing misprints.  Any information found to be copied from previous providers is done in the best interest of the patient to provide accurate, quality, and continuity of care.

## 2025-04-01 NOTE — DISCHARGE INSTRUCTIONS
1. Any questions regarding your surgery or procedure are always welcomed at my office, [phone]. Any questions about caring for the wound, convalescence, nausea, pain medication, intestinal function, or any other aspect of surgery should be directed to my office. I will provide any prescriptions for pain medication, antibiotics or any other procedure related needs. If there is skin glue over incisions, it will fall off in 1-2 weeks. If there is dressing you can remove the wound dressing on the second day after the surgery. After removing the dressing, the wound doesn't need to be recovered. Again, the wound may be open to the air. If keeping the wound covered makes you more comfortable, then the dressing should be changed daily. Antibiotic creams or ointments are not required. If you feel that Neosporin or similar product would be helpful, then it is okay to use them. If there is any question about the potential of an infection, then call my office. You can get the wound wet when the dressing is removed. A short shower or bath for the first week is allowed. I wouldn't soak in a hot tub or take a greater than 10 minute bath for the first week.  2. Any questions about your regular medications for blood pressure, diabetes, heart or breathing problems, cholesterol, arthritis or other baseline health issue, should be directed to your family doctor. I cannot provide prescription refills for these medications.  I will always try to review medications with you so there is no confusion.   3. In the long term, you can eat whatever you like. However for the first 24 to 48 hours after anesthesia, you should eat lightly. Two or three small meals is preferred over one big meal. Easy to digest items (liquids, soda, soup, toast, broth, jello) are suggested. A loss of appetite or even nausea is extremely common after any anesthesia. Avoid the Thanksgiving feast, pepperoni pizza and spicy foods for those first several days.   Pain pills, inactivity and convalescence can lead to constipation. I suggest taking Metamucil or other bulk fiber product to avoid this. Constipation should resolve as you get back to regular eating, regular activity and your regular schedule. If the Metamucil doesn't work and several days pass without a bowel movement, then a small dose of Milk of Magnesia (1-2 tablespoons) can be tried. Be careful about taking a second dose of MOM; you can rebound with significant loose stools.  4. I need to see you in the office at your scheduled follow up. At that time, I will check the wounds, review any X-ray or pathology reports, and make arrangements for any other needs.  5. Any paperwork relevant to the surgery such as FMLA, disability, insurance, estimates for return to work, etc., should be brought to my office to be filled out at the post-operative visit.  6. There is a prescription for pain medication. . For the first several days, I would encourage you to take the pain medicine regularly, even if you don't hurt a lot. Keeping a constant level of pain medicine in your system works much better than trying to catch up. After several days, you can reduce the amount of pain medicine as needed. You can take over the counter Tylenol or Ibuprofen (Motrin) in addition to the prescription pain pills. The combination of the pain pills and the Tylenol /Motrin works much better than either alone.  Don't drive while taking the narcotics. Also, don't drink alcoholic beverages while taking the pain pills.  7. If you have chest pain or shortness of breath go the emergency room. If there are questions about the wound, pain medication, swelling or redness, call the office first. Most issues can be solved without an ER visit. Call [phone].

## 2025-04-01 NOTE — PROGRESS NOTES
04/01/25 1514   Discharge Planning   Living Arrangements Spouse/significant other   Support Systems Spouse/significant other   Type of Residence Private residence   Home or Post Acute Services None   Expected Discharge Disposition Home     Pt admitted for acute appendicitis. Pt lives with spouse and was independent PTA with no HHC or DME. PCP is Lee Ann Sellers. Pt feels she is able to manage her health and understands her medications. Was able to drive and obtain meds. No financial concerns. Pt plans to return home with no new discharge needs. Family will provide transport home.

## 2025-04-01 NOTE — ED PROVIDER NOTES
Emergency Department Provider Note        History of Present Illness     History provided by: Patient  Limitations to History: None  External Records Reviewed with Brief Summary: None    HPI:  Shannon Recio is a 44 y.o. female presenting to the emergency department with right lower quadrant abdominal pain.  Pain started about day and a half ago.  States pain has been constant.  Is made worse with bumps in the car on the way here.  She denies fevers.  She denies any nausea or vomiting.  She has had some loose stools, no blood in stool black tarry stools.  She denies any dysuria hematuria urinary frequency.  No back pain.  No vaginal bleeding or discharge.  Denies similar symptoms in the past.    Physical Exam   Triage vitals:  T 37.3 °C (99.1 °F)  HR (!) 125  BP (!) 196/81  RR 16  O2 96 % None (Room air)    Physical Exam  Vitals and nursing note reviewed.   Constitutional:       Appearance: She is well-developed.   HENT:      Head: Normocephalic and atraumatic.   Eyes:      Extraocular Movements: Extraocular movements intact.      Pupils: Pupils are equal, round, and reactive to light.   Cardiovascular:      Rate and Rhythm: Regular rhythm. Tachycardia present.      Heart sounds: No murmur heard.  Pulmonary:      Effort: Pulmonary effort is normal.      Breath sounds: Normal breath sounds.   Abdominal:      General: Abdomen is flat.      Palpations: Abdomen is soft.      Tenderness: There is abdominal tenderness in the right lower quadrant. There is guarding (Voluntary). There is no right CVA tenderness or left CVA tenderness. Positive signs include Rovsing's sign and McBurney's sign. Negative signs include psoas sign and obturator sign.   Skin:     General: Skin is warm.      Capillary Refill: Capillary refill takes less than 2 seconds.   Neurological:      General: No focal deficit present.      Mental Status: She is alert and oriented to person, place, and time.      Motor: No weakness.   Psychiatric:          Mood and Affect: Mood normal.         Behavior: Behavior normal.          Medical Decision Making & ED Course   Medical Decision Makin y.o. female with history of metastatic breast cancer on Ibrance presenting with right lower quadrant abdominal pain.  She is focally tender over the right lower quadrant.  IV established and labs performed.  Labs all reviewed.  No leukocytosis.  She does have leukocyte Estrace and white blood cells, but no bacteria on the urine.  CAT scan of the abdomen pelvis was ordered.  This is consistent with acute appendicitis.  Patient is covered with Zosyn.  Spoke with surgery who agrees to admit the patient to her service.  Patient on reevaluation continues to look well, is hemodynamically stable.  Patient not requesting any pain medication at this time.  I did update patient on results and plan of care.  ----     Social Determinants of Health which Significantly Impact Care: None identified     EKG Independent Interpretation: EKG not obtained    Independent Result Review and Interpretation: Relevant laboratory and radiographic results were reviewed and independently interpreted by myself.  As necessary, they are commented on in the ED Course.    Chronic conditions affecting the patient's care: As documented above in [facility]    The patient was discussed with the following consultants/services: Dr. Negron    Care Considerations: As documented above in [facility]    ED Course:  ED Course as of 25 0250   e 2025   0239 CAT scan reviewed.  Does have evidence consistent with acute appendicitis.  Call placed to surgery, Dr. Negron. [JJ]   0249 I spoke with Dr. Negron who agrees to admit the patient to her service.  Calls placed to [facility] for H&P. [JJ]      ED Course User Index  [JJ] Steve Tang DO         Diagnoses as of 25 0250   Acute appendicitis with localized peritonitis, without perforation or abscess, unspecified whether gangrene present      Disposition   As a result of their workup, the patient will require admission to the hospital.  The patient was informed of her diagnosis.  The patient was given the opportunity to ask questions and I answered them. The patient agreed to be admitted to the hospital.    Procedures   Procedures    Patient was seen independently    Steve Tang DO  Emergency Medicine     Steve Tang DO  04/01/25 0252

## 2025-04-01 NOTE — ANESTHESIA PROCEDURE NOTES
Airway  Date/Time: 4/1/2025 10:00 AM  Urgency: elective    Airway not difficult    Staffing  Performed: DENIA   Authorized by: Yosef Helm DO    Performed by: DENIA Muller  Patient location during procedure: OR    Indications and Patient Condition  Indications for airway management: anesthesia  Spontaneous Ventilation: absent  Sedation level: deep  Preoxygenated: yes  Patient position: sniffing  MILS maintained throughout  Mask difficulty assessment: 1 - vent by mask    Final Airway Details  Final airway type: endotracheal airway      Successful airway: ETT  Cuffed: yes   Successful intubation technique: direct laryngoscopy  Endotracheal tube insertion site: oral  Blade: Guerline  Blade size: #3  ETT size (mm): 7.0  Cormack-Lehane Classification: grade I - full view of glottis  Placement verified by: chest auscultation and capnometry   Cuff volume (mL): 6  Measured from: lips  ETT to lips (cm): 22  Number of attempts at approach: 1    Additional Comments  Lips and teeth in pre anesthetic conditions after laryngoscopy

## 2025-04-01 NOTE — ASSESSMENT & PLAN NOTE
- Plan for laparoscopic appendectomy later in the morning  -N.p.o.  -Given 1 L LR bolus in ER. Another 1L LR fluid bolus to be given followed by maintenance fluids at 125 mL/h continuous  - IV Zosyn  - PRN morphine and dilaudid for pain  - PRN zofran for nausea/vomiting  - AM labs: CBC, BMP, Mg, Type and screen  - Q4H vital signs

## 2025-04-01 NOTE — ANESTHESIA POSTPROCEDURE EVALUATION
Patient: Shannon Recio    Procedure Summary       Date: 04/01/25 Room / Location: [facility] OR 08 / Virtual [facility] OR    Anesthesia Start: 0951 Anesthesia Stop: 1103    Procedure: APPENDECTOMY, LAPAROSCOPIC Diagnosis:       Acute appendicitis with localized peritonitis, without perforation or abscess, unspecified whether gangrene present      (Acute appendicitis with localized peritonitis, without perforation or abscess, unspecified whether gangrene present [K35.30])    Surgeons: Leslie Negron MD Responsible Provider: Yosef Helm DO    Anesthesia Type: general ASA Status: 2            Anesthesia Type: general    Vitals Value Taken Time   /63 04/01/25 1103   Temp 36 04/01/25 1103   Pulse 112 04/01/25 1103   Resp 12 04/01/25 1103   SpO2 94 04/01/25 1103       Anesthesia Post Evaluation    Patient location during evaluation: PACU  Patient participation: complete - patient participated  Level of consciousness: awake and alert  Pain management: satisfactory to patient  Airway patency: patent  Cardiovascular status: acceptable  Respiratory status: acceptable, spontaneous ventilation and nasal cannula  Hydration status: acceptable  Postoperative Nausea and Vomiting: none        No notable events documented.

## 2025-04-01 NOTE — OP NOTE
APPENDECTOMY, LAPAROSCOPIC Operative Note     Date: 2025  OR Location: [facility] OR    Name: Shannon Recio, : 1980, Age: 44 y.o., MRN: [id number], Sex: female    Diagnosis  Pre-op Diagnosis      * Acute appendicitis with localized peritonitis, without perforation or abscess, unspecified whether gangrene present [K35.30] Post-op Diagnosis     * Acute appendicitis with localized peritonitis, without perforation or abscess, unspecified whether gangrene present [K35.30]     Procedures  APPENDECTOMY, LAPAROSCOPIC  54682 - NC LAPAROSCOPIC APPENDECTOMY      Surgeons      * Leslie Negron - Primary    Resident/Fellow/Other Assistant:  Surgeons and Role:  * No surgeons found with a matching role *    Staff:   Circulator: Kylah  Scrub Person: Demi Zheng Person: Petey    Anesthesia Staff: Anesthesiologist: Yosef Helm DO  C-AA: DENIA Muller    Procedure Summary  Anesthesia: Anesthesia type not filed in the log.  ASA: II  Estimated Blood Loss: 1mL  Intra-op Medications:   Administrations occurring from 0930 to 1120 on 25:   Medication Name Total Dose   BUPivacaine-EPINEPHrine (PF) (Marcaine w/EPI) 0.5 %-1:200,000 injection 20 mL   dexAMETHasone (Decadron) PF injection 10 mg/mL 10 mg   fentaNYL (Sublimaze) injection 50 mcg/mL 100 mcg   HYDROmorphone (Dilaudid) injection 1 mg/mL 1 mg   lactated Ringer's bolus 1,000 mL Cannot be calculated   lidocaine PF (Xylocaine-MPF) local injection 2 % 80 mg   midazolam (Versed) injection 1 mg/mL 2 mg   ondansetron (Zofran) injection 4 mg Cannot be calculated   ondansetron (Zofran) 2 mg/mL injection 4 mg   piperacillin-tazobactam (Zosyn) 3.375 g in dextrose (iso) IV 50 mL 3.375 g   propofol (Diprivan) injection 10 mg/mL 200 mg   rocuronium (Zemuron) 50 mg/5 mL prefilled syringe 50 mg              Anesthesia Record               Intraprocedure I/O Totals       None           Specimen:   ID Type Source Tests Collected by Time   1 : appendix Tissue  APPENDIX SURGICAL PATHOLOGY EXAM Leslie Negron MD 4/1/2025 1020                 Drains and/or Catheters: * None in log *    Tourniquet Times:         Implants:     Findings: Acute appendicitis, without surrounding purulence.  No perforation.    Indications: Shannon Recio is an 44 y.o. female who is having surgery for Acute appendicitis with localized peritonitis, without perforation or abscess, unspecified whether gangrene present [K35.30].     Risks, benefits, alternatives, and complications were explained, all questions were answered, and pt signed informed consent.       DESCRIPTION OF PROCEDURE:   The patient was taken to the operating room and placed on the operative table in supine position. Upon induction of general anesthetic, the patient was intubated endotracheally.  Her left arm was tucked.  Patient already had Zosyn given within 4 hours of OR.  A time-out per protocol was conducted. Pt voided right before going to the OR so no domingo catheter was placed. The abdominal wall was prepped, and draped sterilely. The abdomen was entered supraumbilically through previous incision using Umana technique and insufflated to a pressure of 15 mm per mercury. The abdomen was inspected and there was no injury upon entry.  Next, two 5 mm ports were placed, one port in the left abdomen and a second 5 mm suprapubic port was placed under direct visualization.  Evaluation of the abdomen was performed without evidence of inflammatory or neoplastic process.  The appendix is identified and it is acutely inflamed but not suppurative.  The appendix was grasped using a nonpenetrating bowel grasper and a window was made at the base of the appendix using [place] grasper.  Then a 60 blue load Endo HERNAN stapler was used to divide the appendix at its base from the cecum. The mesoappendix was divided using the [place] LigaSure.  The appendix was delivered into the Endo Catch bag and then removed through the 12 mm port site  and sent to pathology.  The area was inspected, staples were in place and hemostatic, and there was no blood, bile, or succus noted.  There was no fluid in the pelvis either.  The abdomen was desufflated and all ports were removed.  The 12 mm port site was closed using a figure-of-eight 0 Vicryl stitch. Quarter percent Marcaine with epi was infused at all port sites in the subcuticular space.  0 Vicryl was also used to approximate the deep dermis.  Skin at all port sites was closed using interrupted 4-0 Monocryl stitch.  Dermabond was placed over top for dressing.  Instrument and sponge counts were correct x2. I was present throughout the entire procedure. The patient was extubated uneventfully and was transferred to the recovery room in stable condition.  I spoke to her  and informed him of the procedure and answered all questions.          Shahana Negron MD MPH   General Surgery   Office: [phone]   Fax: [phone]        Complications:  None; patient tolerated the procedure well.    Disposition: PACU - hemodynamically stable.  Condition: stable                 Additional Details: Patient may shower tomorrow.    Attending Attestation: I was present and scrubbed for the entire procedure.    Leslie Negron  Phone Number: [phone]

## 2025-04-02 ENCOUNTER — PATIENT OUTREACH (OUTPATIENT)
Dept: PRIMARY CARE | Facility: CLINIC | Age: 45
End: 2025-04-02
Payer: COMMERCIAL

## 2025-04-02 LAB — BACTERIA UR CULT: NORMAL

## 2025-04-02 NOTE — PROGRESS NOTES
Discharge Facility: [facility]  Discharge Diagnosis: APPENDECTOMY   Admission Date:  4/1/25 admit  Discharge Date:     PCP Appointment Date: Message sent to Dr Sellers office  Specialist Appointment Date:    APR 14 2025 02:45 PM - Gen Surg Post Op Visit  [facility] [place] - Leslie Negron MD     JUN 11 2025 09:00 AM - NEUROLOGY ESTABLISHED PATIENT VISIT  [facility] [place] - Keenan Ford MD     JUL 2 2025 09:30 AM - Hematology and Oncology Established Patient  [facility] Dr Mehnaz Kohli, APRN-[facility]     Hospital Encounter and Summary Linked: Yes  ED to Hosp-Admission (Discharged) with Leslie Negron MD; Steve Tang DO (04/01/2025)   See discharge assessment below for further details     Wrap Up  Wrap Up Additional Comments: Patient denies any fever or s/s of infection. DIscharge summary or med req not available patient verbalizes oxycodone and colace reviewed those medications and patient states understanding of indications and precautions. This CM sent a message to Dr Sellers office. Reviewed upcoming appt with Surgeon and encouraged patient to call surgeon for release to work. This CM gives contact information for non urgent matters (4/2/2025  8:26 AM)    Engagement  Call Start Time: 0821 (4/2/2025  8:26 AM)    Medications  Medications reviewed with patient/caregiver?: -- (no med req or discharge summary availabe. Patient verbalized oxycodone and colace reviewed those) (4/2/2025  8:26 AM)  Is the patient having any side effects they believe may be caused by any medication additions or changes?: No (4/2/2025  8:26 AM)  Does the patient have all medications ordered at discharge?: Yes (4/2/2025  8:26 AM)  Prescription Comments: no discharge summary (4/2/2025  8:26 AM)    Appointments  Does the patient have a primary care provider?: Yes (4/2/2025  8:26 AM)  Care Management Interventions: Advised patient to make appointment (Message sent to Dr Sellers)  (4/2/2025  8:26 AM)  Care Management Interventions: Advised patient to keep appointment (4/2/2025  8:26 AM)    Self Management  What is the home health agency?: n/a (4/2/2025  8:26 AM)  What Durable Medical Equipment (DME) was ordered?: n/a (4/2/2025  8:26 AM)    Patient Teaching  Does the patient have access to their discharge instructions?: Yes (4/2/2025  8:26 AM)  Care Management Interventions: Reviewed instructions with patient (4/2/2025  8:26 AM)  What is the patient's perception of their health status since discharge?: Improving (4/2/2025  8:26 AM)  Is the patient/caregiver able to teach back the hierarchy of who to call/visit for symptoms/problems? PCP, Specialist, Home Health nurse, Urgent Care, ED, 911: Yes (4/2/2025  8:26 AM)

## 2025-04-08 LAB
LABORATORY COMMENT REPORT: NORMAL
PATH REPORT.FINAL DX SPEC: NORMAL
PATH REPORT.GROSS SPEC: NORMAL
PATH REPORT.RELEVANT HX SPEC: NORMAL
PATH REPORT.TOTAL CANCER: NORMAL

## 2025-04-10 ENCOUNTER — APPOINTMENT (OUTPATIENT)
Dept: PRIMARY CARE | Facility: CLINIC | Age: 45
End: 2025-04-10
Payer: COMMERCIAL

## 2025-04-10 VITALS
WEIGHT: 160.6 LBS | BODY MASS INDEX: 30.32 KG/M2 | HEIGHT: 61 IN | TEMPERATURE: 97 F | HEART RATE: 94 BPM | DIASTOLIC BLOOD PRESSURE: 85 MMHG | SYSTOLIC BLOOD PRESSURE: 121 MMHG

## 2025-04-10 DIAGNOSIS — R82.90 CLOUDY URINE: ICD-10-CM

## 2025-04-10 DIAGNOSIS — Z13.220 SCREENING, LIPID: ICD-10-CM

## 2025-04-10 DIAGNOSIS — G47.33 OSA (OBSTRUCTIVE SLEEP APNEA): ICD-10-CM

## 2025-04-10 DIAGNOSIS — K35.80 ACUTE APPENDICITIS, UNSPECIFIED ACUTE APPENDICITIS TYPE: Primary | ICD-10-CM

## 2025-04-10 DIAGNOSIS — E66.811 CLASS 1 OBESITY DUE TO EXCESS CALORIES WITHOUT SERIOUS COMORBIDITY WITH BODY MASS INDEX (BMI) OF 30.0 TO 30.9 IN ADULT: ICD-10-CM

## 2025-04-10 DIAGNOSIS — R73.9 HYPERGLYCEMIA: ICD-10-CM

## 2025-04-10 DIAGNOSIS — M25.521 RIGHT ELBOW PAIN: ICD-10-CM

## 2025-04-10 DIAGNOSIS — E66.09 CLASS 1 OBESITY DUE TO EXCESS CALORIES WITHOUT SERIOUS COMORBIDITY WITH BODY MASS INDEX (BMI) OF 30.0 TO 30.9 IN ADULT: ICD-10-CM

## 2025-04-10 PROCEDURE — 99495 TRANSJ CARE MGMT MOD F2F 14D: CPT | Performed by: FAMILY MEDICINE

## 2025-04-10 PROCEDURE — 1036F TOBACCO NON-USER: CPT | Performed by: FAMILY MEDICINE

## 2025-04-10 PROCEDURE — 3008F BODY MASS INDEX DOCD: CPT | Performed by: FAMILY MEDICINE

## 2025-04-10 ASSESSMENT — PATIENT HEALTH QUESTIONNAIRE - PHQ9
2. FEELING DOWN, DEPRESSED OR HOPELESS: NOT AT ALL
SUM OF ALL RESPONSES TO PHQ9 QUESTIONS 1 AND 2: 0
1. LITTLE INTEREST OR PLEASURE IN DOING THINGS: NOT AT ALL

## 2025-04-10 NOTE — ASSESSMENT & PLAN NOTE
Patient is going to monitor her symptoms.  Recommend avoiding hard surfaces with the elbow.  If symptoms persist or worsen, we will have her see orthopedics for further evaluation.

## 2025-04-10 NOTE — ASSESSMENT & PLAN NOTE
Patient is status post appendectomy.  Pain is controlled and she is eating and drinking well and having bowel movements.  Has follow-up scheduled with her surgeon.

## 2025-04-10 NOTE — PROGRESS NOTES
"Patient: Shannon Recio  : 1980  PCP: Lee Ann Sellers MD  MRN: [id number]  Program: Oncology Core  Status: Enrolled  Effective Dates: 2023 - present  Responsible Staff: [facility]  Social Drivers to be Addressed: No information to display    Transitional Care Management  Status: Enrolled  Effective Dates: 2025 - present  Responsible Staff: Nola Ocampo RN  Social Drivers to be Addressed: No information to display         Shannon Recio is a 44 y.o. female presenting today for follow-up after being discharged from the hospital 10 days ago. The main problem requiring admission was appendicitis. The discharge summary and/or Transitional Care Management documentation was reviewed. Medication reconciliation was performed as indicated via the \"Alek as Reviewed\" timestamp.     Shannon Recio was contacted by Transitional Care Management services two days after her discharge. This encounter and supporting documentation was reviewed.  Patient presents today for follow-up from her recent hospitalization.  She reports that the day prior to admission she developed pain in her lower right lower quadrant.  States that she did not have any nausea or vomiting.  No fever.  No difficulty passing gas.She eventually had worsening pain and went to the ER and was found to have an appendicitis that had not ruptured.  She had an appendectomy later that day and was discharged later that day.  She states that since then she has been able to eat and drink without difficulty.  She had some constipation but this is resolved.  States she still have some tenderness at the incisions but otherwise doing okay.  We also reviewed that her CT scan showed a kidney stone in the right kidney.  She states that she did have some cloudy urine for a few days but that has resolved.  No dysuria, urinary urgency or frequency.  She has been having some pain in her right elbow.  She notices this when she sets her elbow " "down.  It does not bother her otherwise.  No injury.  She denies any chest pain or shortness of breath.  She continues to follow-up with her oncologist.  She states that her migraines have been better since her oophorectomy.  She states that mood has been stable.  She denies any other concerns.  Review of Systems    /85 (BP Location: Right arm, Patient Position: Sitting)   Pulse 94   Temp 36.1 °C (97 °F)   Ht 1.549 m (5' 1\")   Wt 72.8 kg (160 lb 9.6 oz)   BMI 30.35 kg/m²     Physical Exam  Vitals reviewed.   HENT:      Head: Normocephalic and atraumatic.      Right Ear: Tympanic membrane normal.      Left Ear: Tympanic membrane normal.      Nose: Nose normal.      Mouth/Throat:      Pharynx: Oropharynx is clear.   Eyes:      Extraocular Movements: Extraocular movements intact.      Conjunctiva/sclera: Conjunctivae normal.      Pupils: Pupils are equal, round, and reactive to light.   Cardiovascular:      Rate and Rhythm: Normal rate and regular rhythm.      Pulses: Normal pulses.   Pulmonary:      Effort: Pulmonary effort is normal.      Breath sounds: Normal breath sounds.   Abdominal:      General: There is no distension.      Palpations: Abdomen is soft.      Tenderness: There is no abdominal tenderness.      Comments: Well-healing incisions at the umbilicus, lower abdomen and left abdomen.  Some bruising also noted.   Musculoskeletal:         General: Normal range of motion.      Cervical back: Normal range of motion and neck supple.      Right lower leg: No edema.      Left lower leg: No edema.   Lymphadenopathy:      Cervical: No cervical adenopathy.   Neurological:      General: No focal deficit present.      Mental Status: She is alert.         The complexity of medical decision making for this patient's transitional care is moderate.    Assessment/Plan   Problem List Items Addressed This Visit             ICD-10-CM    Class 1 obesity due to excess calories without serious comorbidity with body " mass index (BMI) of 30.0 to 30.9 in adult E66.811, E66.09, Z68.30    Hyperglycemia R73.9     Blood sugar was mildly elevated previously.  Patient to continue to work on healthy diet and exercise.  She declines seeing a dietitian.  Will plan to recheck CMP and hemoglobin A1c.         Relevant Orders    Comprehensive Metabolic Panel    CBC and Auto Differential    Hemoglobin A1c    MARYA (obstructive sleep apnea) G47.33     Patient has been diagnosed with sleep apnea and uses a CPAP machine seen with good results.         Acute appendicitis - Primary K35.80     Patient is status post appendectomy.  Pain is controlled and she is eating and drinking well and having bowel movements.  Has follow-up scheduled with her surgeon.         Cloudy urine R82.90     Patient feels this has resolved.  I did put in a urinalysis order in case it would recur.         Relevant Orders    Urinalysis with Reflex Culture and Microscopic    Right elbow pain M25.521     Patient is going to monitor her symptoms.  Recommend avoiding hard surfaces with the elbow.  If symptoms persist or worsen, we will have her see orthopedics for further evaluation.          Other Visit Diagnoses         Codes    Screening, lipid     Z13.220    Relevant Orders    Lipid Panel

## 2025-04-10 NOTE — ASSESSMENT & PLAN NOTE
Blood sugar was mildly elevated previously.  Patient to continue to work on healthy diet and exercise.  She declines seeing a dietitian.  Will plan to recheck CMP and hemoglobin A1c.

## 2025-04-10 NOTE — ASSESSMENT & PLAN NOTE
Patient feels this has resolved.  I did put in a urinalysis order in case it would recur.   normal systolic function. EF  60%. The left atrium is dilated. Mild mitral regurgitation is present. There is moderate tricuspid regurgitation with RVSP estimated at 63 mmHg. Right atrial size is dilated . Severe Pulmonary hypertension with PASP of 63 mmHg  There is a small circumferential pericardial effusion noted. Cardioversion 6/3/15:  Successful cardioversion from atrial fibrillation with rapid  ventricular response to normal sinus rhythm. The patient was placed on  antiarrhythmic therapy with amiodarone. Echo 7/11/16:  Normal left ventricle size, wall thickness, and systolic function with an estimated ejection fraction of 55-60%. No regional wall motion abnormalities are seen. Normal right ventricular size and function. Mild mitral regurgitation. There is mild-moderate tricuspid regurgitation. Estimated pulmonary artery systolic pressure is 40 mmHg assuming a right atrial pressure of 3 mmHg.     Stress test 9/18/17  Normal myocardial perfusion study.    Study not gated secondary to atrial fibrillation but normal LV size.    Uncontrolled hypertension.    Overall findings represent a low risk study. Assessment:  1. Chronic Atrial Fibrillation  2. Essential Hypertension  3. Coronary Calcifications, chest CT  4. Hyperlipidemia with goal LDL<70mg/dL    Plan:   I think that Mr. Danny Villarreal  is entirely stable from a cardiovascular standpoint. I see no need to make any changes currently in his medical regimen or pursue further testing. I have encouraged him to increase his aerobic exercise as his fatigue is likely due to deconditioning. He will remain on Pradaxa for anticoagulation and CVA prophylaxis. His lipid profile and blood pressure are favorable at this time. I will have the patient follow up in 6 months.

## 2025-04-14 ENCOUNTER — APPOINTMENT (OUTPATIENT)
Dept: SURGERY | Facility: CLINIC | Age: 45
End: 2025-04-14
Payer: COMMERCIAL

## 2025-04-14 VITALS
OXYGEN SATURATION: 95 % | HEART RATE: 108 BPM | SYSTOLIC BLOOD PRESSURE: 122 MMHG | TEMPERATURE: 97 F | RESPIRATION RATE: 16 BRPM | DIASTOLIC BLOOD PRESSURE: 84 MMHG

## 2025-04-14 DIAGNOSIS — K35.30 ACUTE APPENDICITIS WITH LOCALIZED PERITONITIS, WITHOUT PERFORATION OR ABSCESS, UNSPECIFIED WHETHER GANGRENE PRESENT: Primary | ICD-10-CM

## 2025-04-14 PROCEDURE — 99024 POSTOP FOLLOW-UP VISIT: CPT | Performed by: SURGERY

## 2025-04-14 NOTE — PROGRESS NOTES
Follow-Up Note        Referring Provider: ED        Chief Complaint:  Follow up from appendectomy        History of Present Illness:  This is a 44-year-old female who presents for follow-up after laparoscopic appendectomy on 4/1/2025.  She has been doing well since surgery.     Past Medical History:  Refer to H&P         Past Surgical History:  Refer to H&P         Medications:  Refer to H&P         Allergies:  Refer to H&P         Family History:  Refer to H&P         Social History:  Refer to H&P         Review of Systems  A complete 10 point review of systems was performed and is negative except as noted in the history of present illness.           Vitals: See vital section below        Physical Exam:   /84 (BP Location: Right arm, Patient Position: Sitting, BP Cuff Size: Large adult)   Pulse 108   Temp 36.1 °C (97 °F) (Temporal)   Resp 16   SpO2 95%     General: No acute distress. Sitting up in chair  Neuro: Alert and oriented ×3. Follows commands.  Head: Atraumatic  Eyes: Pupils equal reactive to light. Extraocular motions intact.  Ears: Hears normal speaking voice.  Mouth, Nose, Throat: Mucous membranes moist.  Normal dentition.  Neck: Supple. No appreciable masses.  Chest: No crepitus.    Heart: Regular rate and rhythm.  Vascular: No carotid bruits.  Palpable radial pulses bilaterally.  Abdomen: Soft. Nondistended. Nontender.  Well-healed laparoscopic port sites.  Musculoskeletal: Moves all extremities.  Normal range of motion.  Lymphatic: No palpable lymph nodes.  Skin: No rashes or lesions.  Psychological: Normal affect     Pathology: FINAL DIAGNOSIS  A. APPENDIX:    -- Suppurative appendicitis with periappendicitis and hemorrhage.       Assessment:  This is a 44-year-old female who presents for follow up of a laparoscopic appendectomy for acute appendicitis.  She has been doing very well since surgery.  She has no complaints.  There is no evidence of any obvious complications.           Plan:  --  OK to swim in 2weeks.  -- No lifting anything heavier than 20 pounds for another 4 weeks, which is 6 weeks postoperative.  -- At this point, there is no specific need to follow up with me.  If she has any new questions or concerns, she may return at any time.        Leslie Negron MD MPH  General Surgery  Office: [phone]  Fax: [phone]

## 2025-04-15 ENCOUNTER — PATIENT OUTREACH (OUTPATIENT)
Dept: PRIMARY CARE | Facility: CLINIC | Age: 45
End: 2025-04-15
Payer: COMMERCIAL

## 2025-04-15 NOTE — PROGRESS NOTES
Call regarding appt. with PCP on 4/10/25 after hospitalization.  At time of outreach call the patient feels as if their condition has improved since last visit.  Reviewed the PCP appointment with the pt and addressed any questions or concerns.

## 2025-05-07 LAB — SCAN RESULT: NORMAL

## 2025-05-14 ENCOUNTER — PATIENT OUTREACH (OUTPATIENT)
Dept: CARDIOLOGY | Facility: CLINIC | Age: 45
End: 2025-05-14
Payer: COMMERCIAL

## 2025-06-11 ENCOUNTER — APPOINTMENT (OUTPATIENT)
Dept: NEUROLOGY | Facility: CLINIC | Age: 45
End: 2025-06-11
Payer: COMMERCIAL

## 2025-06-11 VITALS
WEIGHT: 159.5 LBS | BODY MASS INDEX: 30.11 KG/M2 | TEMPERATURE: 97.1 F | DIASTOLIC BLOOD PRESSURE: 76 MMHG | SYSTOLIC BLOOD PRESSURE: 118 MMHG | HEIGHT: 61 IN | HEART RATE: 96 BPM | RESPIRATION RATE: 14 BRPM

## 2025-06-11 DIAGNOSIS — G43.809 OTHER MIGRAINE WITHOUT STATUS MIGRAINOSUS, NOT INTRACTABLE: Primary | ICD-10-CM

## 2025-06-11 PROCEDURE — 3008F BODY MASS INDEX DOCD: CPT | Performed by: PSYCHIATRY & NEUROLOGY

## 2025-06-11 PROCEDURE — 99213 OFFICE O/P EST LOW 20 MIN: CPT | Performed by: PSYCHIATRY & NEUROLOGY

## 2025-06-11 PROCEDURE — 1036F TOBACCO NON-USER: CPT | Performed by: PSYCHIATRY & NEUROLOGY

## 2025-06-11 ASSESSMENT — PATIENT HEALTH QUESTIONNAIRE - PHQ9
SUM OF ALL RESPONSES TO PHQ9 QUESTIONS 1 AND 2: 0
1. LITTLE INTEREST OR PLEASURE IN DOING THINGS: NOT AT ALL
2. FEELING DOWN, DEPRESSED OR HOPELESS: NOT AT ALL

## 2025-06-11 NOTE — PROGRESS NOTES
"Chief Complaint: Migraine    HPI  44 y.o. female presenting for follow up regarding migraine.    Since the last visit, she has been doing well.  Her migraine frequency is up to twice per month.  Her migraines are described as a throbbing sensation in the bifrontal region.  She has associated photophobia, phonophobia.  She takes Sumatriptan which usually breaks the migraine.  Her migraine triggers include either too little or too much caffeine, poor sleep, dehydration, stress.        Current Medications[1]      Objective:  /76 (BP Location: Left arm, Patient Position: Sitting, BP Cuff Size: Adult)   Pulse 96   Temp 36.2 °C (97.1 °F) (Temporal)   Resp 14   Ht 1.549 m (5' 1\")   Wt 72.3 kg (159 lb 8 oz)   BMI 30.14 kg/m²     Gen: NAD  Neuro:  --HIF: A&O X 3, repetition and naming intact  --CN:  PERRLA, EOMI, VFF, no visible facial asymmetry, facial sensation intact, no tongue or palatal deviation, SCM intact  --Motor: Moves all 4 extremities equally; no focal deficits  --Sensory: Intact to light touch, intact to pinprick  --Reflex: 2+ symmetric, toes down  --Cerebellum: FTN and HTS intact  --Gait: Normal, narrow based gait    Relevant Results      Assessment:    Migraine - current migraine frequency is up to two days/month  - recommend holding off on a migraine preventative medication  - continue Sumatriptan as abortive medication  - keep migraine diary  - follow up as needed      Keenan Ford MD  [facility]  Department of Neurology           [1]   Current Outpatient Medications:     ashwagandha extract 500 mg capsule, Take 1 capsule by mouth once daily., Disp: , Rfl:     busPIRone (Buspar) 15 mg tablet, Take 1 tablet (15 mg) by mouth 2 times a day., Disp: 180 tablet, Rfl: 3    calcium carbonate-vitamin D3 500 mg-5 mcg (200 unit) tablet, Take 1 tablet by mouth once daily., Disp: , Rfl:     cholecalciferol (Vitamin D3) 50 mcg (2,000 unit) capsule, Take 1 capsule (50 mcg) by mouth once daily., " Disp: , Rfl:     ciclopirox 1 % shampoo, Apply 1 Application topically once daily. To wet scalp; leave in for 5-10 minutes then as needed for flares, Disp: 120 mL, Rfl: 11    clindamycin (Cleocin T) 1 % gel, Apply topically once daily. (Patient taking differently: Apply topically once daily as needed.), Disp: 60 g, Rfl: 11    Colostrum/selen/gr t/Mush Cmb1 (COLOSTRUM-SE-GRTEA-MUSHROOMCMB ORAL), Take 1 tablet by mouth once daily. Mushroom Extract, Disp: , Rfl:     cyanocobalamin (Vitamin B-12) 1,000 mcg tablet, Take 1 tablet (1,000 mcg) by mouth once daily., Disp: , Rfl:     exemestane (Aromasin) 25 mg tablet, Take 1 tablet (25 mg total) by mouth once daily.  Take after a meal.  Try to take at the same time each day., Disp: 90 tablet, Rfl: 3    glucosam/chond-msm1/C/carolina/bor (GLUCOSAMINE-CHOND-MSM COMPLEX ORAL), Take 1 tablet by mouth once daily., Disp: , Rfl:     hydrOXYzine HCL (Atarax) 25 mg tablet, Take 1 tablet (25 mg) by mouth every 4 hours if needed for itching., Disp: 30 tablet, Rfl: 3    lactobacillus acidophilus tablet tablet, Take 1 tablet by mouth once daily., Disp: , Rfl:     multivitamin tablet, Take 1 tablet by mouth once daily., Disp: , Rfl:     palbociclib (Ibrance) 100 mg tablet, Take 1 tablet (100 mg) by mouth once daily for 21 days, then off for 7 days, Disp: 21 tablet, Rfl: 6    SUMAtriptan (Imitrex) 50 mg tablet, Take 1 tablet (50 mg) by mouth 1 time if needed for migraine. May repeat dose once in 2 hours if no relief.  Do not exceed 2 doses in 24 hours., Disp: 9 tablet, Rfl: 3    spironolactone (Aldactone) 50 mg tablet, Take 1 tablet (50 mg) by mouth once daily., Disp: 90 tablet, Rfl: 4    venlafaxine XR (Effexor XR) 75 mg 24 hr capsule, Take 1 capsule (75 mg) by mouth once daily. Do not crush or chew., Disp: 90 capsule, Rfl: 4

## 2025-06-17 ENCOUNTER — OFFICE VISIT (OUTPATIENT)
Dept: PRIMARY CARE | Facility: CLINIC | Age: 45
End: 2025-06-17
Payer: COMMERCIAL

## 2025-06-17 VITALS
HEART RATE: 103 BPM | SYSTOLIC BLOOD PRESSURE: 120 MMHG | BODY MASS INDEX: 30.66 KG/M2 | OXYGEN SATURATION: 93 % | WEIGHT: 162.4 LBS | HEIGHT: 61 IN | DIASTOLIC BLOOD PRESSURE: 80 MMHG

## 2025-06-17 DIAGNOSIS — Z87.2 HISTORY OF ECZEMA: ICD-10-CM
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
## 2025-06-17 DIAGNOSIS — R21 RASH OF FACE: Primary | ICD-10-CM

## 2025-06-17 PROCEDURE — 3008F BODY MASS INDEX DOCD: CPT | Performed by: INTERNAL MEDICINE

## 2025-06-17 PROCEDURE — 1036F TOBACCO NON-USER: CPT | Performed by: INTERNAL MEDICINE

## 2025-06-17 PROCEDURE — 99213 OFFICE O/P EST LOW 20 MIN: CPT | Performed by: INTERNAL MEDICINE

## 2025-06-17 RX ORDER — METHYLPREDNISOLONE 4 MG/1
TABLET ORAL
Qty: 21 TABLET | Refills: 0 | Status: SHIPPED | OUTPATIENT
Start: 2025-06-17 | End: 2025-06-23

## 2025-06-17 ASSESSMENT — ENCOUNTER SYMPTOMS
LOSS OF SENSATION IN FEET: 0
DEPRESSION: 0
OCCASIONAL FEELINGS OF UNSTEADINESS: 0

## 2025-06-17 ASSESSMENT — PATIENT HEALTH QUESTIONNAIRE - PHQ9
2. FEELING DOWN, DEPRESSED OR HOPELESS: NOT AT ALL
1. LITTLE INTEREST OR PLEASURE IN DOING THINGS: NOT AT ALL
SUM OF ALL RESPONSES TO PHQ9 QUESTIONS 1 & 2: 0

## 2025-06-17 NOTE — PROGRESS NOTES
"Internal Medicine Outpatient Visit  Chief Complaint   Patient presents with    Allergic Reaction     Possible allergic reaction ob face, using face wipes and also possible bug bite, started yesterday. Very itchy and red bumpy. Right side of face and forehead.        HPI: Shannon Recio is of 44 y.o. who is here for Internal Visit for the following issues:  Patient is seen in office today with chief complaint of rash involving the forehead and right maxillary area of the face since yesterday.  She has some noticed some bugs covering over her face.  She also used some cream after that she is not sure whether she had actually bug bite or not.  The swelling and redness is worse than yesterday she has a lot of itching.  She also informs me that she has a history of eczema but has not had significant problems with eczema attacks recently.  She used to have eczema symptoms earlier stage of life more frequently.  She denies any difficulty swallowing there is no shortness of breath or wheezing.  She has no fever or chill.  Review of other systems are negative.    Surgical History[1]    Family History[2]      Medications Ordered Prior to Encounter[3]    Blood pressure 120/80, pulse 103, height 1.549 m (5' 1\"), weight 73.7 kg (162 lb 6.4 oz), SpO2 93%.  Body mass index is 30.69 kg/m².  General examination: There is no acute discomfort  Eyes: There is no pallor or jaundice  Examination of face: Right maxillary area is slightly swollen and red.  It blanches on pressure.  There is also mild erythema on the right side of forehead.  Neck: There is no JVD  Lungs: Clear to auscultation  CVS: Normal exam    Assessment and plan:    1. Rash of face (Primary)  Patient has triamcinolone cream however because possibility of side effects and changes in the skin of the face I did advise the patient to use Benadryl over-the-counter and if symptoms persist or not improving she can use Medrol Dosepak which is being prescribed.  - " methylPREDNISolone (Medrol Dospak) 4 mg tablets; Take as directed on package.  Dispense: 21 tablet; Refill: 0    2. History of eczema  Patient did not have any recent episodes of eczema exacerbation.  The current rash is not consistent with eczema.  - methylPREDNISolone (Medrol Dospak) 4 mg tablets; Take as directed on package.  Dispense: 21 tablet; Refill: 0    3. Bug bite, initial encounter  Patient is advised to call our office if she has any new symptom.  I could not see any definite bite marks on her face.                         [1]   Past Surgical History:  Procedure Laterality Date    ADENOIDECTOMY      APPENDECTOMY  04/01/2025    [facility]    BREAST RECONSTRUCTION  07/31/2023    both    COLPOSCOPY  09/13/2017    Colposcopy    HEMORRHOID SURGERY  09/13/2017    Hemorrhoidectomy    MASTECTOMY  05/13/2022    MOUTH SURGERY  02/04/2022    Oral Surgery Tooth Extraction    OOPHORECTOMY  10/2022    OTHER SURGICAL HISTORY  02/04/2022    Complete colonoscopy    OTHER SURGICAL HISTORY  06/30/2022    Mastectomy bilateral    TONSILLECTOMY  08/20/2013    Tonsillectomy    US GUIDED NEEDLE LIVER BIOPSY  08/01/2022    US GUIDED NEEDLE LIVER BIOPSY 8/1/2022 [facility]    WISDOM TOOTH EXTRACTION     [2]   Family History  Problem Relation Name Age of Onset    Skin cancer Mother Vilma Lonny     Cancer Mother Vilma Lonny     Fibromyalgia Mother Vilma Lonny     Migraines Mother Vilma Lonny     Arthritis Mother Vilma Lonny     Dementia Father Keaton Lonny     Arthritis Father Keaton Lonny     Depression Father Keaton Lnony     Diabetes Father Keaton Lonny     Heart disease Father Keaton Lonny     Hyperlipidemia Father Keaton Lonny     Hypertension Father Keaton Lonny     Breast cancer Sister      Cancer Maternal Grandmother Consuelo Wilson     Cancer Paternal Grandfather Jose Mukherjee     Cancer Paternal Grandmother Blanca Mukherjee     Cancer Sister Coreen oLng    [3]   Current Outpatient Medications on File Prior to Visit   Medication Sig  Dispense Refill    ashwagandha extract 500 mg capsule Take 1 capsule by mouth once daily.      busPIRone (Buspar) 15 mg tablet Take 1 tablet (15 mg) by mouth 2 times a day. 180 tablet 3    calcium carbonate-vitamin D3 500 mg-5 mcg (200 unit) tablet Take 1 tablet by mouth once daily.      cholecalciferol (Vitamin D3) 50 mcg (2,000 unit) capsule Take 1 capsule (50 mcg) by mouth once daily.      ciclopirox 1 % shampoo Apply 1 Application topically once daily. To wet scalp; leave in for 5-10 minutes then as needed for flares 120 mL 11    Colostrum/selen/gr t/Mush Cmb1 (COLOSTRUM-SE-GRTEA-MUSHROOMCMB ORAL) Take 1 tablet by mouth once daily. Mushroom Extract      cyanocobalamin (Vitamin B-12) 1,000 mcg tablet Take 1 tablet (1,000 mcg) by mouth once daily.      exemestane (Aromasin) 25 mg tablet Take 1 tablet (25 mg total) by mouth once daily.  Take after a meal.  Try to take at the same time each day. 90 tablet 3    glucosam/chond-msm1/C/carolnia/bor (GLUCOSAMINE-CHOND-MSM COMPLEX ORAL) Take 1 tablet by mouth once daily.      hydrOXYzine HCL (Atarax) 25 mg tablet Take 1 tablet (25 mg) by mouth every 4 hours if needed for itching. 30 tablet 3    lactobacillus acidophilus tablet tablet Take 1 tablet by mouth once daily.      multivitamin tablet Take 1 tablet by mouth once daily.      palbociclib (Ibrance) 100 mg tablet Take 1 tablet (100 mg) by mouth once daily for 21 days, then off for 7 days 21 tablet 6    SUMAtriptan (Imitrex) 50 mg tablet Take 1 tablet (50 mg) by mouth 1 time if needed for migraine. May repeat dose once in 2 hours if no relief.  Do not exceed 2 doses in 24 hours. 9 tablet 3    clindamycin (Cleocin T) 1 % gel Apply topically once daily. (Patient taking differently: Apply topically once daily as needed.) 60 g 11    spironolactone (Aldactone) 50 mg tablet Take 1 tablet (50 mg) by mouth once daily. 90 tablet 4    venlafaxine XR (Effexor XR) 75 mg 24 hr capsule Take 1 capsule (75 mg) by mouth once daily. Do not  crush or chew. 90 capsule 4     No current facility-administered medications on file prior to visit.

## 2025-06-23 ENCOUNTER — APPOINTMENT (OUTPATIENT)
Dept: NEUROLOGY | Facility: CLINIC | Age: 45
End: 2025-06-23
Payer: COMMERCIAL

## 2025-06-25 ENCOUNTER — PATIENT OUTREACH (OUTPATIENT)
Dept: CARDIOLOGY | Facility: CLINIC | Age: 45
End: 2025-06-25
Payer: COMMERCIAL

## 2025-06-26 NOTE — PROGRESS NOTES
"Patient ID: Abby Recio is a 45 y.o. female.    Oncology History   Malignant neoplasm of overlapping sites of left breast in female, estrogen receptor positive   10/24/2023 Initial Diagnosis    Malignant neoplasm of overlapping sites of left breast in female, estrogen receptor positive (CMS/HCC)     1/15/2024 -  Chemotherapy    Palbociclib + Aromatase Inhibitor, 28 Day Cycle           Subjective    HPI    Shannon Recio is a 45 y.o. female presenting for follow up of left breast cancer.     Patient presents for follow-up visit.  Since last visit she had her appendix removed.  She continues to follow-up with neurology for her migraines.  She continues on Ibrance and is tolerating well.  Her last dose was Monday morning so she is now on her week off.  She states that she has been getting refills without any issues.  She states that she has noticed some pulling in her left underarm from elbow to axillary area when she lifts her left arm above her head, she has done some push-ups but no other changes.  She has not noticed any other new changes or concerns.    Patient denies chest pain, heart palpitations, cough, fevers or chills, breast changes, new lumps or bumps, GI symptoms, edema or other concerns.  She denies bone pains, abdominal pains or unusual bloating.    Patient's past medical history, surgical history, family history and social history reviewed.    Review of Systems:   Review of Systems:    Positive per HPI, otherwise negative.       Objective    Visit Vitals  /83   Pulse 87   Temp 35.9 °C (96.6 °F)   Resp 18   Ht (S) (!) 1.544 m (5' 0.79\")   Wt 72.8 kg (160 lb 7.9 oz)   SpO2 93%   BMI 30.54 kg/m²   Smoking Status Never   BSA 1.77 m²       Physical Exam  Constitutional:       General: She is not in acute distress.     Appearance: Normal appearance.   HENT:      Head: Normocephalic.      Mouth/Throat:      Mouth: Mucous membranes are moist.      Pharynx: No oropharyngeal exudate or posterior " oropharyngeal erythema.   Eyes:      General: No scleral icterus.     Pupils: Pupils are equal, round, and reactive to light.   Cardiovascular:      Rate and Rhythm: Normal rate and regular rhythm.   Pulmonary:      Effort: Pulmonary effort is normal. No respiratory distress.      Breath sounds: Normal breath sounds. No wheezing.   Abdominal:      General: Abdomen is flat. Bowel sounds are normal. There is no distension.      Palpations: Abdomen is soft.      Tenderness: There is no abdominal tenderness.   Musculoskeletal:         General: Normal range of motion.      Cervical back: Normal range of motion and neck supple.   Skin:     General: Skin is warm and dry.   Neurological:      General: No focal deficit present.      Mental Status: She is alert and oriented to person, place, and time. Mental status is at baseline.      Motor: No weakness.      Gait: Gait normal.   Psychiatric:         Mood and Affect: Mood normal.         Behavior: Behavior normal.         Judgment: Judgment normal.         Performance Status:  Symptomatic; fully ambulatory    Labs/Imaging/Pathology: Personally reviewed reports and images in Epic electronic medical record system. Pertinent results as it related to the plan represented in below in assessment and plan.     Labs:  Lab Results   Component Value Date    WBC 2.2 (L) 07/02/2025    NEUTROABS 1.54 07/02/2025    IGABSOL 0.00 07/02/2025    LYMPHSABS 0.43 (L) 07/02/2025    MONOSABS 0.17 07/02/2025    EOSABS 0.06 07/02/2025    BASOSABS 0.03 07/02/2025    RBC 3.42 (L) 07/02/2025     (H) 07/02/2025    MCHC 34.2 07/02/2025    HGB 11.8 (L) 07/02/2025    HCT 34.5 (L) 07/02/2025     07/02/2025     Lab Results   Component Value Date    CREATININE 0.71 07/02/2025    BUN 18 07/02/2025    EGFR >90 07/02/2025     07/02/2025    K 4.2 07/02/2025     07/02/2025    CO2 25 07/02/2025      Lab Results   Component Value Date    ALT 37 07/02/2025    AST 19 07/02/2025    ALKPHOS 72  07/02/2025    BILITOT 0.2 07/02/2025          Assessment/Plan    1. Left breast IDC, stage IIB [id number], ER>95% HI>95% , HER2 neg, low risk mammaprint  2. Biopsy proven metastases to liver 8/1/22, hormone positive     - initially palpated a left breast mass with nipple changes in 3/21/2021 and she was referred to Dr. Waterman - diagnostic mammogram with tomosynthesis on 3/30/2022 and there was a mass highly suspicious of malignancy in the left breast. No findings in  the right breast. She underwent subsequently a ultrasound on the same day and she was found to have an irregular spiculated hypoechoic shadowing mass measuring 1.9 x 1.6 x 2.9 cm at 1 o'clock, 3 cm from the nipple. There were 8 lymph nodes noted in the  left axilla with 2-3 lymph nodes of intermediate suspicion of involvement  - core needle biopsy on 4/11/2022 and final pathology shows invasive ductal carcinoma, grade 2, in DCIS in the breast mass. The most suspicious lymph node was also biopsied and negative for carcinoma  - 5/13/22 bilateral mastectomy. Left breast showed invasive ductal carcinoma, grade 3 with DCIS. Final tumor size was 3.4 cm. Grade 3 with no extensive intraductal component. There was indeterminant left LVI. DCIS was present. All margins were negative.  There was also macrometastatic carcinoma in 2/4 lymph nodes. End stage N1a. Given stage 2 disease, she did not have staging scans completed  - initially discussed with her low risk mammaprint despite higher clinical risk with node involvement, ultimately decided to not pursue adjuvant chemotherapy   - during CT sim for RT planning, found to have concerning breast lesion  - Liver biopsy 8/1/22 consistent with primary breast cancer  - today discussed new diagnosis of metastatic disease and overall approach to therapy and prognosis  - Reviewed first line therapy with CDK 4/6 inhibitor plus AI inhibitor  - Regarding choice of CDK4/6 inhibitor, given liver involvement I discussed with  patient that although there is some more longer survival data with ribociclib, I would be concerned about risk of hepatotoxicity as if there is no other distant disease we  could consider treatment of oligometastatic disease  - 8/2022- started Ibrance 125 mg PO daily D1-21 every 28 days with letrozole 2.5 mg PO daily  - bilateral salpingo-oophorectomy on 10/10/22  - Signatera from 11/7 0.07 MTM/mL  - complete 50 Gy in 25 fractions to her regional nodes and chest wall along with SBRT completed on 5/22/2023. She also underwent SBRT to the liver metastases from 4/4 to 4/11/2023, for a total of 54 Gy in 3 fractions.   - PET CT from 1/23/23, which shows there is no clear evidence of disease.   -  scans from 7/20/23. No new evidence of recurrence.   - imaging from 12/11/23 shows no new evidence of recurrence in the bone, chest, abdomen or pelvis.   - 2/12/24:  - Patient overall doing well on Ibrance, labs unremarkable. No contraindication to proceed with Zometa today.  - She does have some new right rib and chest pain for the last month. Will plan to evaluate further with CT of the chest.  - Will plan for a telephone visit for followup on the day after CT chest 2/27/24.     3/11/24: Telephone call  - Overall, CT chest from 2/26/24 is unremarkable.   - Blood work from 2/12/24 also unremarkable.   - At this point, we will make no changes and plan for repeat blood work in 3 months.  - RTC in 3 months.      6/3/24:   - No remarkable findings on exam today.  - Labs today are pending.  - Overall tolerating Ibrance  - She has started these supplements and met with Dr. Collazo.  - Refills provided for Ibrance and exemestane.  - Today, she is worried about her hair loss. Recently started topical minoxidil.  - We'll check testosterone level as she is does have a family history of PCOS.  - I will also add spironolactone 50 mg daily, which we can increase to 100 mg if she tolerates it.  - Plan for PET scan in three months. It'll be two  years at that point of being on a CDK 46 inhibitor.  - We could consider stopping the Ibrance and continuing exemestane alone.  - RTC in three months    9/5/24:  - Recent PET CT yesterday shows no FGD avid disease.  - Patient is now 2 years out.  - We discussed there is really no data to stop treatment.  - Due to some fatigue we discussed we will plan to cut back Ibrance to 100 mg to see if this helps with the fatigue.  - Will plan to continue Zometa for 3-5 years every 6 months.  - Next CT c/a/p  and bone scan in 6 months.  - In the future we can try and get her scans and Zometa on schedule together.  - RTC in 3 with me for Zometa and in 6 months for scans.      3/7/25:   - No evidence of clear disease progression on imaging   - She does have some densities on the kidneys that are enlarging, though most consistent with cysts. We discussed we will plan for MRI to follow up.    - She does have some concerns for fatty liver and pre diabetes. We discussed the importance of lifestyle changes; can consider GLP in the future.    - Otherwise, will continue IBRANCE as scheduled   - RTC for phone visit after MRI   - Likely moving forward with plan for labs in 3 months and scans in 6 months    3/28/25: Virtual visit   - MRI of the kidney shows simple cysts   - No other new symptoms   - We will continue surveillance   - No change in Ibrance, refill provided   - RTC in 3 months with labs, 6 months with Dr. Kumar    7/2/2025:  - Presents for follow-up visit  -Advised to continue on Ibrance 100 mg for 21 days followed by 7 days off and her daily exemestane  -Due for Zometa infusion today and denies any recent or upcoming dental work  -Discussed that the under arm pain sounds to be muscular but she was advised to call if it is persistent, worsens or other symptoms develop.  There are no skin changes lumps or bumps noted on exam  -Labs reviewed, Signatera pending  - RTC as already scheduled with Dr. Kumar in October after PET scan        Reviewed ongoing medical problems and how they relate to his malignancy, will continue long term monitoring.    Diagnoses and all orders for this visit:  Malignant neoplasm of overlapping sites of left breast in female, estrogen receptor positive  -     Clinic Appointment Request [facility]  -     exemestane (Aromasin) 25 mg tablet; Take 1 tablet (25 mg total) by mouth once daily.  Take after a meal.  Try to take at the same time each day.      Rocky Shearer, APRN-CNP

## 2025-07-02 ENCOUNTER — LAB (OUTPATIENT)
Dept: LAB | Facility: CLINIC | Age: 45
End: 2025-07-02
Payer: COMMERCIAL

## 2025-07-02 ENCOUNTER — OFFICE VISIT (OUTPATIENT)
Dept: HEMATOLOGY/ONCOLOGY | Facility: CLINIC | Age: 45
End: 2025-07-02
Payer: COMMERCIAL

## 2025-07-02 ENCOUNTER — INFUSION (OUTPATIENT)
Dept: HEMATOLOGY/ONCOLOGY | Facility: CLINIC | Age: 45
End: 2025-07-02
Payer: COMMERCIAL

## 2025-07-02 VITALS
WEIGHT: 160.5 LBS | SYSTOLIC BLOOD PRESSURE: 124 MMHG | HEART RATE: 87 BPM | OXYGEN SATURATION: 93 % | BODY MASS INDEX: 30.3 KG/M2 | DIASTOLIC BLOOD PRESSURE: 83 MMHG | RESPIRATION RATE: 18 BRPM | HEIGHT: 61 IN | TEMPERATURE: 96.6 F

## 2025-07-02 DIAGNOSIS — C50.812 MALIGNANT NEOPLASM OF OVERLAPPING SITES OF LEFT BREAST IN FEMALE, ESTROGEN RECEPTOR POSITIVE: ICD-10-CM

## 2025-07-02 DIAGNOSIS — Z17.0 MALIGNANT NEOPLASM OF OVERLAPPING SITES OF LEFT BREAST IN FEMALE, ESTROGEN RECEPTOR POSITIVE: Primary | ICD-10-CM

## 2025-07-02 DIAGNOSIS — C50.812 MALIGNANT NEOPLASM OF OVERLAPPING SITES OF LEFT BREAST IN FEMALE, ESTROGEN RECEPTOR POSITIVE: Primary | ICD-10-CM

## 2025-07-02 DIAGNOSIS — Z17.0 MALIGNANT NEOPLASM OF OVERLAPPING SITES OF LEFT BREAST IN FEMALE, ESTROGEN RECEPTOR POSITIVE: ICD-10-CM

## 2025-07-02 LAB
ALBUMIN SERPL BCP-MCNC: 4.4 G/DL (ref 3.4–5)
ALP SERPL-CCNC: 72 U/L (ref 33–110)
ALT SERPL W P-5'-P-CCNC: 37 U/L (ref 7–45)
ANION GAP SERPL CALC-SCNC: 12 MMOL/L (ref 10–20)
AST SERPL W P-5'-P-CCNC: 19 U/L (ref 9–39)
BASOPHILS # BLD AUTO: 0.03 X10*3/UL (ref 0–0.1)
BASOPHILS NFR BLD AUTO: 1.3 %
BILIRUB SERPL-MCNC: 0.2 MG/DL (ref 0–1.2)
BUN SERPL-MCNC: 18 MG/DL (ref 6–23)
CALCIUM SERPL-MCNC: 9.4 MG/DL (ref 8.6–10.3)
CHLORIDE SERPL-SCNC: 106 MMOL/L (ref 98–107)
CO2 SERPL-SCNC: 25 MMOL/L (ref 21–32)
CREAT SERPL-MCNC: 0.71 MG/DL (ref 0.5–1.05)
EGFRCR SERPLBLD CKD-EPI 2021: >90 ML/MIN/1.73M*2
EOSINOPHIL # BLD AUTO: 0.06 X10*3/UL (ref 0–0.7)
EOSINOPHIL NFR BLD AUTO: 2.7 %
ERYTHROCYTE [DISTWIDTH] IN BLOOD BY AUTOMATED COUNT: 13 % (ref 11.5–14.5)
GLUCOSE SERPL-MCNC: 126 MG/DL (ref 74–99)
HCT VFR BLD AUTO: 34.5 % (ref 36–46)
HGB BLD-MCNC: 11.8 G/DL (ref 12–16)
IMM GRANULOCYTES # BLD AUTO: 0 X10*3/UL (ref 0–0.7)
IMM GRANULOCYTES NFR BLD AUTO: 0 % (ref 0–0.9)
LYMPHOCYTES # BLD AUTO: 0.43 X10*3/UL (ref 1.2–4.8)
LYMPHOCYTES NFR BLD AUTO: 19.3 %
MAGNESIUM SERPL-MCNC: 2 MG/DL (ref 1.6–2.4)
MCH RBC QN AUTO: 34.5 PG (ref 26–34)
MCHC RBC AUTO-ENTMCNC: 34.2 G/DL (ref 32–36)
MCV RBC AUTO: 101 FL (ref 80–100)
MONOCYTES # BLD AUTO: 0.17 X10*3/UL (ref 0.1–1)
MONOCYTES NFR BLD AUTO: 7.6 %
NEUTROPHILS # BLD AUTO: 1.54 X10*3/UL (ref 1.2–7.7)
NEUTROPHILS NFR BLD AUTO: 69.1 %
NRBC BLD-RTO: ABNORMAL /100{WBCS}
PHOSPHATE SERPL-MCNC: 3.1 MG/DL (ref 2.5–4.9)
PLATELET # BLD AUTO: 157 X10*3/UL (ref 150–450)
POTASSIUM SERPL-SCNC: 4.2 MMOL/L (ref 3.5–5.3)
PROT SERPL-MCNC: 6.9 G/DL (ref 6.4–8.2)
RBC # BLD AUTO: 3.42 X10*6/UL (ref 4–5.2)
RBC MORPH BLD: NORMAL
SODIUM SERPL-SCNC: 139 MMOL/L (ref 136–145)
WBC # BLD AUTO: 2.2 X10*3/UL (ref 4.4–11.3)

## 2025-07-02 PROCEDURE — 3008F BODY MASS INDEX DOCD: CPT

## 2025-07-02 PROCEDURE — 99214 OFFICE O/P EST MOD 30 MIN: CPT

## 2025-07-02 PROCEDURE — 99214 OFFICE O/P EST MOD 30 MIN: CPT | Mod: 25

## 2025-07-02 PROCEDURE — 80053 COMPREHEN METABOLIC PANEL: CPT

## 2025-07-02 PROCEDURE — 83735 ASSAY OF MAGNESIUM: CPT

## 2025-07-02 PROCEDURE — 85025 COMPLETE CBC W/AUTO DIFF WBC: CPT

## 2025-07-02 PROCEDURE — 2500000004 HC RX 250 GENERAL PHARMACY W/ HCPCS (ALT 636 FOR OP/ED): Performed by: INTERNAL MEDICINE

## 2025-07-02 PROCEDURE — 96365 THER/PROPH/DIAG IV INF INIT: CPT | Mod: INF

## 2025-07-02 PROCEDURE — 86300 IMMUNOASSAY TUMOR CA 15-3: CPT

## 2025-07-02 PROCEDURE — 84100 ASSAY OF PHOSPHORUS: CPT

## 2025-07-02 PROCEDURE — 36415 COLL VENOUS BLD VENIPUNCTURE: CPT

## 2025-07-02 RX ORDER — HEPARIN 100 UNIT/ML
500 SYRINGE INTRAVENOUS AS NEEDED
OUTPATIENT
Start: 2025-07-02

## 2025-07-02 RX ORDER — FAMOTIDINE 10 MG/ML
20 INJECTION, SOLUTION INTRAVENOUS ONCE AS NEEDED
OUTPATIENT
Start: 2025-12-17

## 2025-07-02 RX ORDER — EPINEPHRINE 0.3 MG/.3ML
0.3 INJECTION SUBCUTANEOUS EVERY 5 MIN PRN
OUTPATIENT
Start: 2025-12-17

## 2025-07-02 RX ORDER — ALBUTEROL SULFATE 0.83 MG/ML
3 SOLUTION RESPIRATORY (INHALATION) AS NEEDED
OUTPATIENT
Start: 2025-12-17

## 2025-07-02 RX ORDER — ZOLEDRONIC ACID 0.04 MG/ML
4 INJECTION, SOLUTION INTRAVENOUS ONCE
OUTPATIENT
Start: 2025-12-17

## 2025-07-02 RX ORDER — EXEMESTANE 25 MG/1
25 TABLET ORAL DAILY
Qty: 90 TABLET | Refills: 3 | Status: SHIPPED | OUTPATIENT
Start: 2025-07-02

## 2025-07-02 RX ORDER — HEPARIN SODIUM,PORCINE/PF 10 UNIT/ML
50 SYRINGE (ML) INTRAVENOUS AS NEEDED
OUTPATIENT
Start: 2025-07-02

## 2025-07-02 RX ORDER — ZOLEDRONIC ACID 0.04 MG/ML
4 INJECTION, SOLUTION INTRAVENOUS ONCE
Status: COMPLETED | OUTPATIENT
Start: 2025-07-02 | End: 2025-07-02

## 2025-07-02 RX ORDER — DIPHENHYDRAMINE HYDROCHLORIDE 50 MG/ML
50 INJECTION, SOLUTION INTRAMUSCULAR; INTRAVENOUS AS NEEDED
OUTPATIENT
Start: 2025-12-17

## 2025-07-02 RX ADMIN — ZOLEDRONIC ACID 4 MG: 0.04 INJECTION, SOLUTION INTRAVENOUS at 10:53

## 2025-07-02 RX ADMIN — SODIUM CHLORIDE 500 ML: 9 INJECTION, SOLUTION INTRAVENOUS at 10:54

## 2025-07-02 ASSESSMENT — PAIN SCALES - GENERAL: PAINLEVEL_OUTOF10: 0-NO PAIN

## 2025-07-03 LAB — CANCER AG27-29 SERPL-ACNC: 18.5 U/ML (ref 0–38.6)

## 2025-07-15 ENCOUNTER — TELEPHONE (OUTPATIENT)
Dept: HEMATOLOGY/ONCOLOGY | Facility: CLINIC | Age: 45
End: 2025-07-15
Payer: COMMERCIAL

## 2025-07-15 NOTE — TELEPHONE ENCOUNTER
I called and spoke to jorje, she was notified that her signatera was negative. She gave verbal understanding and was appreciative of the call.

## 2025-08-11 ASSESSMENT — PROMIS GLOBAL HEALTH SCALE
EMOTIONAL_PROBLEMS: RARELY
RATE_GENERAL_HEALTH: FAIR
RATE_AVERAGE_PAIN: 2
RATE_SOCIAL_SATISFACTION: VERY GOOD
RATE_PHYSICAL_HEALTH: FAIR
CARRYOUT_PHYSICAL_ACTIVITIES: COMPLETELY
RATE_AVERAGE_FATIGUE: MODERATE
RATE_MENTAL_HEALTH: VERY GOOD
RATE_QUALITY_OF_LIFE: GOOD
CARRYOUT_SOCIAL_ACTIVITIES: VERY GOOD

## 2025-08-12 ENCOUNTER — APPOINTMENT (OUTPATIENT)
Dept: PRIMARY CARE | Facility: CLINIC | Age: 45
End: 2025-08-12
Payer: COMMERCIAL

## 2025-08-12 VITALS
OXYGEN SATURATION: 95 % | HEIGHT: 61 IN | TEMPERATURE: 97.2 F | BODY MASS INDEX: 30.41 KG/M2 | HEART RATE: 80 BPM | DIASTOLIC BLOOD PRESSURE: 72 MMHG | SYSTOLIC BLOOD PRESSURE: 113 MMHG | WEIGHT: 161.1 LBS

## 2025-08-12 DIAGNOSIS — H91.93 BILATERAL HEARING LOSS, UNSPECIFIED HEARING LOSS TYPE: ICD-10-CM

## 2025-08-12 DIAGNOSIS — E66.09 CLASS 1 OBESITY DUE TO EXCESS CALORIES WITHOUT SERIOUS COMORBIDITY WITH BODY MASS INDEX (BMI) OF 30.0 TO 30.9 IN ADULT: ICD-10-CM

## 2025-08-12 DIAGNOSIS — H61.23 BILATERAL IMPACTED CERUMEN: ICD-10-CM

## 2025-08-12 DIAGNOSIS — I89.0 LYMPHEDEMA: ICD-10-CM

## 2025-08-12 DIAGNOSIS — E66.811 CLASS 1 OBESITY DUE TO EXCESS CALORIES WITHOUT SERIOUS COMORBIDITY WITH BODY MASS INDEX (BMI) OF 30.0 TO 30.9 IN ADULT: ICD-10-CM

## 2025-08-12 DIAGNOSIS — F32.A ANXIETY AND DEPRESSION: ICD-10-CM

## 2025-08-12 DIAGNOSIS — G43.809 OTHER MIGRAINE WITHOUT STATUS MIGRAINOSUS, NOT INTRACTABLE: ICD-10-CM

## 2025-08-12 DIAGNOSIS — R53.83 FATIGUE, UNSPECIFIED TYPE: ICD-10-CM

## 2025-08-12 DIAGNOSIS — R73.9 HYPERGLYCEMIA: ICD-10-CM

## 2025-08-12 DIAGNOSIS — Z00.00 HEALTHCARE MAINTENANCE: Primary | ICD-10-CM

## 2025-08-12 DIAGNOSIS — F41.9 ANXIETY AND DEPRESSION: ICD-10-CM

## 2025-08-12 PROCEDURE — 99396 PREV VISIT EST AGE 40-64: CPT | Performed by: FAMILY MEDICINE

## 2025-08-12 PROCEDURE — 1036F TOBACCO NON-USER: CPT | Performed by: FAMILY MEDICINE

## 2025-08-12 PROCEDURE — 99214 OFFICE O/P EST MOD 30 MIN: CPT | Performed by: FAMILY MEDICINE

## 2025-08-12 PROCEDURE — 3008F BODY MASS INDEX DOCD: CPT | Performed by: FAMILY MEDICINE

## 2025-08-12 RX ORDER — SUMATRIPTAN SUCCINATE 50 MG/1
50 TABLET ORAL ONCE AS NEEDED
Qty: 9 TABLET | Refills: 3 | Status: SHIPPED | OUTPATIENT
Start: 2025-08-12

## 2025-08-12 RX ORDER — BUSPIRONE HYDROCHLORIDE 15 MG/1
15 TABLET ORAL 2 TIMES DAILY
Qty: 180 TABLET | Refills: 3 | Status: SHIPPED | OUTPATIENT
Start: 2025-08-12

## 2025-08-15 DIAGNOSIS — Z17.0 MALIGNANT NEOPLASM OF OVERLAPPING SITES OF LEFT BREAST IN FEMALE, ESTROGEN RECEPTOR POSITIVE: ICD-10-CM

## 2025-08-15 DIAGNOSIS — C50.812 MALIGNANT NEOPLASM OF OVERLAPPING SITES OF LEFT BREAST IN FEMALE, ESTROGEN RECEPTOR POSITIVE: ICD-10-CM

## 2025-08-15 DIAGNOSIS — L65.9 HAIR LOSS: ICD-10-CM

## 2025-08-15 RX ORDER — VENLAFAXINE HYDROCHLORIDE 75 MG/1
75 CAPSULE, EXTENDED RELEASE ORAL DAILY
Qty: 90 CAPSULE | Refills: 3 | Status: SHIPPED | OUTPATIENT
Start: 2025-08-15 | End: 2026-08-15

## 2025-09-04 LAB — SCAN RESULT: NORMAL

## 2025-09-09 ENCOUNTER — APPOINTMENT (OUTPATIENT)
Facility: CLINIC | Age: 45
End: 2025-09-09
Payer: COMMERCIAL

## 2025-09-24 ENCOUNTER — APPOINTMENT (OUTPATIENT)
Dept: DERMATOLOGY | Facility: CLINIC | Age: 45
End: 2025-09-24
Payer: COMMERCIAL

## 2026-01-20 ENCOUNTER — APPOINTMENT (OUTPATIENT)
Facility: CLINIC | Age: 46
End: 2026-01-20
Payer: COMMERCIAL

## 2026-08-13 ENCOUNTER — APPOINTMENT (OUTPATIENT)
Dept: PRIMARY CARE | Facility: CLINIC | Age: 46
End: 2026-08-13
Payer: COMMERCIAL

## (undated) DEVICE — TROCAR SYSTEM, BALLOON, KII GELPORT, 12 X 100MM

## (undated) DEVICE — LIGASURE, SEALER/DIVIDER MARYLAND JAW, 5MM

## (undated) DEVICE — Device

## (undated) DEVICE — STAPLER, LINEAR ENDO RELOAD, 60MM, BLUE, DISP

## (undated) DEVICE — STAPLER, ECHELON 3000, 60MM STD

## (undated) DEVICE — SUTURE, VICRYL, 0, 27 IN, UR-6, VIOLET

## (undated) DEVICE — TROCAR, KII OPTICAL BLADELESS 5MM Z THREAD 100MM LNGTH

## (undated) DEVICE — SOLUTION, IRRIGATION, USP, SODIUM CHLORIDE 0.9%, .9 NACL, 1000 ML, BAG

## (undated) DEVICE — ADHESIVE, SKIN, DERMABOND ADVANCED, 15CM, PEN-STYLE

## (undated) DEVICE — RETRIEVAL SYSTEM, MONARCH, 10MM DISP ENDOSCOPIC

## (undated) DEVICE — SOLUTION, IRRIGATION, STERILE WATER, 1000 ML, POUR BOTTLE

## (undated) DEVICE — TOWEL PACK, STERILE, 4/PACK, BLUE

## (undated) DEVICE — SUTURE, MONOCRYL, 4-0, 27 IN, PS-2, UNDYED

## (undated) DEVICE — APPLICATOR, CHLORAPREP, W/ORANGE TINT, 26ML

## (undated) DEVICE — GLOVE, SURGICAL, BIOGEL, 6, PF, LATEX, GREEN